# Patient Record
Sex: MALE | Race: WHITE | Employment: FULL TIME | URBAN - METROPOLITAN AREA
[De-identification: names, ages, dates, MRNs, and addresses within clinical notes are randomized per-mention and may not be internally consistent; named-entity substitution may affect disease eponyms.]

---

## 2017-01-03 ENCOUNTER — HOSPITAL ENCOUNTER (OUTPATIENT)
Dept: PHYSICAL THERAPY | Age: 67
Discharge: HOME | End: 2017-01-03
Payer: COMMERCIAL

## 2017-01-03 PROCEDURE — 97110 THERAPEUTIC EXERCISES: CPT

## 2017-01-03 PROCEDURE — 97162 PT EVAL MOD COMPLEX 30 MIN: CPT

## 2017-01-03 NOTE — PROGRESS NOTES
Ambulatory/Rehab Services H2 Model Falls Risk Assessment    Risk Factor Pts. ·   Confusion/Disorientation/Impulsivity  []    4 ·   Symptomatic Depression  []   2 ·   Altered Elimination  []   1 ·   Dizziness/Vertigo  []   1 ·   Gender (Male)  [x]   1 ·   Any administered antiepileptics (anticonvulsants):  []   2 ·   Any administered benzodiazepines:  []   1 ·   Visual Impairment (specify):  []   1 ·   Portable Oxygen Use  []   1 ·   Orthostatic ? BP  []   1 ·   History of Recent Falls (within 3 mos.)  []   5     Ability to Rise from Chair (choose one) Pts. ·   Ability to rise in a single movement  [x]   0 ·   Pushes up, successful in one attempt  []   1 ·   Multiple attempts, but successful  []   3 ·   Unable to rise without assistance  []   4   Total: (5 or greater = High Risk) 1     Falls Prevention Plan:   []                Physical Limitations to Exercise (specify):   []                Mobility Assistance Device (type):   []                Exercise/Equipment Adaptation (specify):    ©2010 [facility] [address] Patent #[id number].  Federal Law prohibits the replication, distribution or use without written permission from [facility] [employer]

## 2017-01-03 NOTE — PROGRESS NOTES
Toro Augustine  : 1950 [id number] [facility],[address]  [address] [place] [address].  [phone]   [phone]        OUTPATIENT PHYSICAL THERAPY:Initial Assessment 1/3/2017      ICD-10: Treatment Diagnosis: neck pain  Precautions/Allergies:   Review of patient's allergies indicates not on file. Fall Risk Score: 1 (? 5 = High Risk)  MD Orders: evaluate and treat MEDICAL/REFERRING DIAGNOSIS:  Neck pain [M54.2]  Acute back pain [M54.9]   DATE OF ONSET: 4 week history  REFERRING PHYSICIAN: Fede Bennett 2022: to be determined     INITIAL ASSESSMENT:  Mr. Don Michael presents to therapy with left sided neck pain that is classified as muscular spasm of the upper trap musculature. He has increased tone and tenderness of the upper trap and levator musculature due to weakness and muscle imbalance of the periscapular musculature. He has no signs of neural involvement or shoulder joint involvement as he has good neural mobility and shoulder mobility. He does have restricted cervical rotation to the left due to protective guarding of the musculature. Skilled therapy is required to return to prior level of function. PROBLEM LIST (Impacting functional limitations):  1. Decreased Strength  2. Decreased ADL/Functional Activities  3. Increased Pain  4. Decreased Activity Tolerance  5. Decreased Flexibility/Joint Mobility INTERVENTIONS PLANNED:  1. Cold  2. Electrical Stimulation  3. Heat  4. Home Exercise Program (HEP)  5. Manual Therapy  6. Neuromuscular Re-education/Strengthening  7. Range of Motion (ROM)  8. Therapeutic Activites  9. Therapeutic Exercise/Strengthening   TREATMENT PLAN:  Effective Dates: 17 TO 17. Frequency/Duration: 2 times a week for 8 weeks  GOALS: (Goals have been discussed and agreed upon with patient.)  Short-Term Functional Goals: Time Frame: 2 weeks  1. Patient will be independent with HEP.    2. Patient will report pain <2/10 with daily activity. 3. Patient will improve cervical rotation to 40 ° to improve capacity for looking over his shoulder while driving. Discharge Goals: Time Frame: 8 weeks  1. Patient will have no pain with activities such as reading >1 hour. 2. Patient will improve lower trap strength to 5/5 to normalize muscle stability throughout the scapula and decrease muscular tone. 3. Patient will improve cervical rotation to 60 ° with no pain at end range. Rehabilitation Potential For Stated Goals: Excellent  Regarding Neeru Kam therapy, I certify that the treatment plan above will be carried out by a therapist or under their direction. Thank you for this referral,  Rama Seaman DPT       Referring Physician Signature: Tika Mattson*              Date                      HISTORY:   History of Present Injury/Illness (Reason for Referral):  Patient presents to therapy with primary complaint of left sided neck pain. Symptoms have been present over the past few weeks and he attributes their onset to an increase in activities with a forward neck posture such as working on his iPad and reading. He does note increased pain with activities such as reading a heavy book. He describes his symptoms as a soreness and tightness through the neck and upper back. This has caused diffculty with sleeping. He has been able to manage his symptoms with self massage, stretching and rolling on a ball, however symptoms are not continuing to resolve. He also notes difficulty with activities such as looking over his left shoulder. Past Medical History/Comorbidities:   Mr. Kassy Diaz  has a past medical history of Acute sinusitis; Bilateral impacted cerumen; Cerumen impaction; Chronic sinusitis; Conductive hearing loss; Esophageal reflux; Hearing loss; Hearing loss due to cerumen impaction; and Itching of ear.   Mr. Kassy Diaz  has a past surgical history that includes back surgery and other surgical.  Social History/Living Environment:     Patient lives at home with his wife. Prior Level of Function/Work/Activity:  Patient is a . Dominant Side:         RIGHT  Current Medications:    Current Outpatient Prescriptions:     aspirin delayed-release 81 mg tablet, Take 81 mg by mouth., Disp: , Rfl:     cetirizine (ZYRTEC) 10 mg tablet, 10 mg., Disp: , Rfl:     tadalafil (CIALIS) 5 mg tablet, 5 mg., Disp: , Rfl:     ciclopirox (LOPROX) 0.77 % susp, APPLY TO THE AFFECTED TOENAILS TWICE DAILY, Disp: , Rfl: 11   Date Last Reviewed:  1/3/2017   # of Personal Factors/Comorbidities that affect the Plan of Care: 1-2: MODERATE COMPLEXITY   EXAMINATION:   Observation/Orthostatic Postural Assessment:          Mild increase in thoracic kyphosis with forward head posture. Left scapula is mildly elevated. Palpation:          Increased tone is present through left levator and scalenes with tender points noted throughout rhomboids and infraspinatus. ROM:     Cervical extension: 60 °   Cervical flexion: 70 °  Cervical rotation: 30 ° L; 60 ° R  Lateral cervical flexion: 40 ° B with left sided pain on overpressure to L and left sided tightness with overpressure to R  Shoulder mobility is full  Thoracic rotation is mildly restricted      Strength:     Deep cervical flexion: 4/5   Shoulder elevation: 5/5 B  Shoulder ER: 5/5 B  Rhomboid: 4/5 L; 5/5 R  Prone scaption: 4/5 L; 5/5 R      Special Tests:          Spurling's (-)  Neurological Screen:        Myotomes:  WNL throughout upper extremity        Reflexes:  Symmetric to Ue        Neural Tension Tests:  full  Functional Mobility:         independent  Balance: WNL   Body Structures Involved:  1. Bones  2. Joints  3. Muscles Body Functions Affected:  1. Neuromusculoskeletal  2. Movement Related Activities and Participation Affected:  1. General Tasks and Demands  2. Mobility  3. Domestic Life  4.  Community, Social and [place]   # of elements that affect the Plan of Care: 3: MODERATE COMPLEXITY   CLINICAL PRESENTATION:   Presentation: Evolving clinical presentation with changing clinical characteristics: MODERATE COMPLEXITY   CLINICAL DECISION MAKING:   Outcome Measure: Tool Used: Modified Oswestry Disability Index  Score:  Initial: 5/50  Most Recent: X/50 (Date: -- )   Interpretation of Score: Each section is scored on a 0-5 scale, 5 representing the greatest disability. The scores of each section are added together for a total score of 50. Score 0 1-10 11-20 21-30 31-40 41-49 50   Modifier CH CI CJ CK CL CM CN       Medical Necessity:   · Patient is expected to demonstrate progress in strength, range of motion, balance and coordination to increase independence with activities that involve movement of the head and to relieve pain. Reason for Services/Other Comments:  · Patient has demonstrated an improvement in functional level by independent performance of HEP. Use of outcome tool(s) and clinical judgement create a POC that gives a: Questionable prediction of patient's progress: MODERATE COMPLEXITY   TREATMENT:   (In addition to Assessment/Re-Assessment sessions the following treatments were rendered)  THERAPEUTIC EXERCISE: (see flow sheet below for specific duration minutes):  Exercises per grid below to improve mobility, strength, balance and coordination. Required minimal visual, verbal and manual cues to promote proper body alignment, promote proper body posture and promote proper body mechanics. Progressed resistance, range, repetitions and complexity of movement as indicated. MANUAL THERAPY: (see flow sheet below for specific duration minutes): Joint mobilization, Soft tissue mobilization and Manipulation was utilized and necessary because of the patient's restricted joint motion, painful spasm, loss of articular motion and restricted motion of soft tissue.    MODALITIES: (see flow sheet below for specific duration minutes):      to relieve pain     Date: 01/03/17       Modalities:                                Therapeutic Exercise: 10 min       Cervical retraction [id number]       Upper trap stretch To R only [id number]       Sidelying ER 3x10 0-3#       Y, T 2x10 with 5sec hold 0-3#                       Proprioceptive Activities:                                Manual Therapy: 5 min       Soft tissue mobilization To upper trap and periscapular musculature performed               Functional Activities:                                        HEP: see flow sheet above for specific duration  Treatment/Session Assessment:  Patient is independent with performance of above described home program. Decreased tone is noted following soft tissue mobilization. · Pain/ Symptoms: Initial:   4/10 Post Session:  2/10 ·   Compliance with Program/Exercises: Will assess as treatment progresses. · Recommendations/Intent for next treatment session: \"Next visit will focus on advancements to more challenging activities\".   Total Treatment Duration:  PT Patient Time In/Time Out  Time In: 0845  Time Out: 9[address] [facility], [facility]

## 2017-01-03 NOTE — PROGRESS NOTES
Ayan Krishnan   (:1950) [address]  [address] [place] [address].  [phone]   [phone]           Outpatient PHYSICAL THERAPY: Initial Assessment 1/3/2017  Fall Risk Score: 1 (? 5 = High Risk)    ICD-10: Treatment Diagnosis: Cervicalgia (M54.2)  REFERRING PHYSICIAN: Mickey Cancino*  MD Orders: Eval and Treat   Return Physician Appointment: to be determined  MEDICAL/REFERRING DIAGNOSIS: Neck pain [M54.2]  Acute back pain [M54.9]  DATE OF ONSET: 4 week history   PRIOR LEVEL OF FUNCTION: fully independent  PRECAUTIONS/ALLERGIES: Not on File    ASSESSMENT:  ?????? ? ? This section established at most recent assessment?????????? Patient presents to therapy with left sided neck pain that is classified as muscular spasm of the upper trap musculature. He has increased tone and tenderness of the upper trap and levator musculature due to weakness and muscle imbalance of the periscapular musculature. He has no signs of neural involvement or shoulder joint involvement as he has good neural mobility and shoulder mobility. He does have restricted cervical rotation to the left due to protective guarding of the musculature. Skilled therapy is required to return to prior level of function. PROBLEM LIST (Impairments causing functional limitations):  1. { :57170:p}  GOALS: (Goals have been discussed and agreed upon with patient.)  SHORT-TERM FUNCTIONAL GOALS: Time Frame: ***  1. ***  DISCHARGE GOALS: Time Frame: ***  1. ***  REHABILITATION POTENTIAL FOR STATED GOALS: { :73551::\"***\"}PLAN OF CARE:  INTERVENTIONS PLANNED: (Benefits and precautions of physical therapy have been discussed with the patient.)  1. {PT PLAN OF CARE:65388:p:\"***\"}  TREATMENT PLAN EFFECTIVE DATES: *** TO ***  FREQUENCY/DURATION: Follow patient { :79002} for { :88048::\"12 weeks\"} to address above goals.   Regarding Asiya Morning therapy, I certify that the treatment plan above will be carried out by a therapist or under their direction. Thank you for this referral,  Vincent Bates DPT     Referring Physician Signature: Tyson Capone          Date                       SUBJECTIVE:    History of Present Injury/Illness (Reason for Referral): ***  Present Symptoms: ***   Pain Intensity 1: 4  Pain Intervention(s) 1: Exercise  Dominant Side: {Side:38610}  Past Medical History:   Past Medical History   Diagnosis Date    Acute sinusitis     Bilateral impacted cerumen     Cerumen impaction     Chronic sinusitis     Conductive hearing loss     Esophageal reflux     Hearing loss     Hearing loss due to cerumen impaction     Itching of ear        Current Medications:   Current Outpatient Prescriptions on File Prior to Encounter   Medication Sig Dispense Refill    aspirin delayed-release 81 mg tablet Take 81 mg by mouth.  cetirizine (ZYRTEC) 10 mg tablet 10 mg.      tadalafil (CIALIS) 5 mg tablet 5 mg.  ciclopirox (LOPROX) 0.77 % susp APPLY TO THE AFFECTED TOENAILS TWICE DAILY  11     No current facility-administered medications on file prior to encounter. Date Last Reviewed: 1/3/2017    Social History/Home Situation: ***     Work/Activity History: ***  OBJECTIVE:    Outcome Measure:   {PT/OT OUTCOME MEASURES:14271210}  Observation/Orthostatic Postural Assessment:   ***  Palpation:   ***  ROM: ***                          Strength: ***                 Special Tests: ***  Gait: ***  Functional Mobility:      ***  Balance:   ***  TREATMENT:    (In addition to Assessment/Re-Assessment sessions the following treatments were rendered)  THERAPEUTIC ACTIVITY: ( See chart below for minutes): Therapeutic activities per grid below to improve {PT MOBILITY:42338579}. Required {NO/MIN/MOD/MAX:06966} {CUES:02733753} cues to {PT PROMOTE:72742701}. THERAPEUTIC EXERCISE: (See chart below for minutes):  Exercises per grid below to improve {PT MOBILITY:61084893}. Required {NO/MIN/MOD/MAX:17821} {CUES:73260149} cues to {PT ALIGNMENT:05357372}. Progressed {desc:05433787} as indicated. Date: ***       Modalities:        ***                        Therapeutic Exercise:        ***                                                Proprioceptive Activities:        ***                        Manual Therapy:        ***                Therapeutic Activities:        ***                                    MANUAL THERAPY: (See chart above for minutes): {MANUAL THERAPY:23521292} was utilized and necessary because of the patient's {MANUAL THERAPY 8:60568406}. MODALITIES: (See chart above for minutes): {PT MODALITIES:10308538}       HEP: As above; handouts given to patient for all exercises. ______________________________________________________________________________________________________    Treatment Assessment:  ***. Progression/Medical Necessity:   · {PROGRESSION:34603070}  Compliance with Program/Exercises: { :154902::\"Will assess as treatment progresses\"}. Reason for Continuation of Services/Other Comments:  · {CONTINUATION:98908803}  Recommendations/Intent for next treatment session: \"Treatment next visit will focus on {MedNecessity:16547850}\".   ***  Total Treatment Duration:  PT Patient Time In/Time Out  Time In: 0845  Time Out: [address], T

## 2017-01-05 ENCOUNTER — HOSPITAL ENCOUNTER (OUTPATIENT)
Dept: PHYSICAL THERAPY | Age: 67
Discharge: HOME | End: 2017-01-05
Payer: COMMERCIAL

## 2017-01-05 PROCEDURE — 97140 MANUAL THERAPY 1/> REGIONS: CPT

## 2017-01-05 NOTE — PROGRESS NOTES
Shahnaz Session  : 1950 [address] [address]  [phone]   [facility] [phone]        OUTPATIENT PHYSICAL THERAPY:Daily Note 2017      ICD-10: Treatment Diagnosis: neck pain  Precautions/Allergies:   Review of patient's allergies indicates not on file. Fall Risk Score: 1 (? 5 = High Risk)  MD Orders: evaluate and treat MEDICAL/REFERRING DIAGNOSIS:  Neck pain [M54.2]  Acute back pain [M54.9]   DATE OF ONSET: 4 week history  REFERRING PHYSICIAN: Fede Bennett 2297: to be determined     INITIAL ASSESSMENT:  Mr. Camilo Jaramillo presents to therapy with left sided neck pain that is classified as muscular spasm of the upper trap musculature. He has increased tone and tenderness of the upper trap and levator musculature due to weakness and muscle imbalance of the periscapular musculature. He has no signs of neural involvement or shoulder joint involvement as he has good neural mobility and shoulder mobility. He does have restricted cervical rotation to the left due to protective guarding of the musculature. Skilled therapy is required to return to prior level of function. PROBLEM LIST (Impacting functional limitations):  1. Decreased Strength  2. Decreased ADL/Functional Activities  3. Increased Pain  4. Decreased Activity Tolerance  5. Decreased Flexibility/Joint Mobility INTERVENTIONS PLANNED:  1. Cold  2. Electrical Stimulation  3. Heat  4. Home Exercise Program (HEP)  5. Manual Therapy  6. Neuromuscular Re-education/Strengthening  7. Range of Motion (ROM)  8. Therapeutic Activites  9. Therapeutic Exercise/Strengthening   TREATMENT PLAN:  Effective Dates: 17 TO 17. Frequency/Duration: 2 times a week for 8 weeks  GOALS: (Goals have been discussed and agreed upon with patient.)  Short-Term Functional Goals: Time Frame: 2 weeks  1. Patient will be independent with HEP.    2. Patient will report pain <2/10 with daily activity. 3. Patient will improve cervical rotation to 40 ° to improve capacity for looking over his shoulder while driving. Discharge Goals: Time Frame: 8 weeks  1. Patient will have no pain with activities such as reading >1 hour. 2. Patient will improve lower trap strength to 5/5 to normalize muscle stability throughout the scapula and decrease muscular tone. 3. Patient will improve cervical rotation to 60 ° with no pain at end range. Rehabilitation Potential For Stated Goals: Excellent  Regarding [facility] therapy, I certify that the treatment plan above will be carried out by a therapist or under their direction. Thank you for this referral,  Grace John DPT       Referring Physician Signature: Talia Maldonado*              Date                      HISTORY:   History of Present Injury/Illness (Reason for Referral):  Patient presents to therapy with primary complaint of left sided neck pain. Symptoms have been present over the past few weeks and he attributes their onset to an increase in activities with a forward neck posture such as working on his iPad and reading. He does note increased pain with activities such as reading a heavy book. He describes his symptoms as a soreness and tightness through the neck and upper back. This has caused diffculty with sleeping. He has been able to manage his symptoms with self massage, stretching and rolling on a ball, however symptoms are not continuing to resolve. He also notes difficulty with activities such as looking over his left shoulder. 01/05/17 patient report: It's actually feeling much better. I get some popping when I'm stretching my neck, but it's nice to know that I'm not harming anything. The shoulder exercises are actually pretty hard. My neck does seem like it's able to rotate a lot better.   Past Medical History/Comorbidities:   Mr. Emily Flowers  has a past medical history of Acute sinusitis; Bilateral impacted cerumen; Cerumen impaction; Chronic sinusitis; Conductive hearing loss; Esophageal reflux; Hearing loss; Hearing loss due to cerumen impaction; and Itching of ear. Mr. Julienne Gillespie  has a past surgical history that includes back surgery and other surgical.  Social History/Living Environment:     Patient lives at home with his wife. Prior Level of Function/Work/Activity:  Patient is a . Dominant Side:         RIGHT  Current Medications:    Current Outpatient Prescriptions:     aspirin delayed-release 81 mg tablet, Take 81 mg by mouth., Disp: , Rfl:     cetirizine (ZYRTEC) 10 mg tablet, 10 mg., Disp: , Rfl:     tadalafil (CIALIS) 5 mg tablet, 5 mg., Disp: , Rfl:     ciclopirox (LOPROX) 0.77 % susp, APPLY TO THE AFFECTED TOENAILS TWICE DAILY, Disp: , Rfl: 11   Date Last Reviewed:  1/5/2017   EXAMINATION:   Observation/Orthostatic Postural Assessment:          Mild increase in thoracic kyphosis with forward head posture. Left scapula is mildly elevated. Palpation:          Increased tone is present through left levator and scalenes with tender points noted throughout rhomboids and infraspinatus. ROM:     Cervical extension: 60 °   Cervical flexion: 70 °  Cervical rotation: 30 ° L; 60 ° R  Lateral cervical flexion: 40 ° B with left sided pain on overpressure to L and left sided tightness with overpressure to R  Shoulder mobility is full  Thoracic rotation is mildly restricted      Strength:     Deep cervical flexion: 4/5   Shoulder elevation: 5/5 B  Shoulder ER: 5/5 B  Rhomboid: 4/5 L; 5/5 R  Prone scaption: 4/5 L; 5/5 R      Special Tests:          Spurling's (-)  Neurological Screen:        Myotomes:  WNL throughout upper extremity        Reflexes:  Symmetric to Ue        Neural Tension Tests:  full  Functional Mobility:         independent  Balance: WNL   Body Structures Involved:  1. Bones  2. Joints  3. Muscles Body Functions Affected:  1. Neuromusculoskeletal  2.  Movement Related Activities and Participation Affected:  1. General Tasks and Demands  2. Mobility  3. Domestic Life  4. Community, Social and Civic Life   CLINICAL PRESENTATION:   CLINICAL DECISION MAKING:   Outcome Measure: Tool Used: Modified Oswestry Disability Index  Score:  Initial: 5/50  Most Recent: X/50 (Date: -- )   Interpretation of Score: Each section is scored on a 0-5 scale, 5 representing the greatest disability. The scores of each section are added together for a total score of 50. Score 0 1-10 11-20 21-30 31-40 41-49 50   Modifier CH CI CJ CK CL CM CN       Medical Necessity:   · Patient is expected to demonstrate progress in strength, range of motion, balance and coordination to increase independence with activities that involve movement of the head and to relieve pain. Reason for Services/Other Comments:  · Patient has demonstrated an improvement in functional level by independent performance of HEP. TREATMENT:   (In addition to Assessment/Re-Assessment sessions the following treatments were rendered)  THERAPEUTIC EXERCISE: (see flow sheet below for specific duration minutes):  Exercises per grid below to improve mobility, strength, balance and coordination. Required minimal visual, verbal and manual cues to promote proper body alignment, promote proper body posture and promote proper body mechanics. Progressed resistance, range, repetitions and complexity of movement as indicated. MANUAL THERAPY: (see flow sheet below for specific duration minutes): Joint mobilization, Soft tissue mobilization and Manipulation was utilized and necessary because of the patient's restricted joint motion, painful spasm, loss of articular motion and restricted motion of soft tissue.    MODALITIES: (see flow sheet below for specific duration minutes):      to relieve pain     Date: 01/03/17 01/05/17 (visit 2)      Modalities:                                Therapeutic Exercise: 10 min 5 min      Cervical retraction [id number]       Upper trap stretch To R only [id number]       Sidelying ER 3x10 0-3#       Y, T 2x10 with 5sec hold 0-3#       Foam rolling  Instructed in self mobilization with lacrosse ball to infraspinatus              Proprioceptive Activities:                                Manual Therapy: 5 min 40 min      Soft tissue mobilization To upper trap and periscapular musculature performed To cervical spine, thoracic paraspinals and posterior shoulder musculature      Passive accessory mobilizations  Lateral cervical glides L to R grade 3 to 3++ 3x30; scapular distraction grade 3 3x30      Functional Activities:                                        HEP: see flow sheet above for specific duration  Treatment/Session Assessment: Tone is improved following manual therapy. Cervical rotation to the L is improved to 60 °. He continues to have pain at end range of rotation. .    · Pain/ Symptoms: Initial:   3/10 Post Session:  0/10 ·   Compliance with Program/Exercises: Will assess as treatment progresses. · Recommendations/Intent for next treatment session: \"Next visit will focus on advancements to more challenging activities\".   Total Treatment Duration:  PT Patient Time In/Time Out  Time In: 0845  Time Out: [facility], [facility]

## 2017-01-09 ENCOUNTER — HOSPITAL ENCOUNTER (OUTPATIENT)
Dept: PHYSICAL THERAPY | Age: 67
Discharge: HOME | End: 2017-01-09
Payer: COMMERCIAL

## 2017-01-09 PROCEDURE — 97140 MANUAL THERAPY 1/> REGIONS: CPT

## 2017-01-09 NOTE — PROGRESS NOTES
Shyanne Sosa  : 1950 [address] [facility]  [address] [place] [address].  [phone]   [phone]        OUTPATIENT PHYSICAL THERAPY:Daily Note 2017      ICD-10: Treatment Diagnosis: neck pain  Precautions/Allergies:   Review of patient's allergies indicates not on file. Fall Risk Score: 1 (? 5 = High Risk)  MD Orders: evaluate and treat MEDICAL/REFERRING DIAGNOSIS:  Neck pain [M54.2]  Acute back pain [M54.9]   DATE OF ONSET: 4 week history  REFERRING PHYSICIAN: Fede Bennett [id number]: to be determined     INITIAL ASSESSMENT:  Mr. Emilia Reddy presents to therapy with left sided neck pain that is classified as muscular spasm of the upper trap musculature. He has increased tone and tenderness of the upper trap and levator musculature due to weakness and muscle imbalance of the periscapular musculature. He has no signs of neural involvement or shoulder joint involvement as he has good neural mobility and shoulder mobility. He does have restricted cervical rotation to the left due to protective guarding of the musculature. Skilled therapy is required to return to prior level of function. PROBLEM LIST (Impacting functional limitations):  1. Decreased Strength  2. Decreased ADL/Functional Activities  3. Increased Pain  4. Decreased Activity Tolerance  5. Decreased Flexibility/Joint Mobility INTERVENTIONS PLANNED:  1. Cold  2. Electrical Stimulation  3. Heat  4. Home Exercise Program (HEP)  5. Manual Therapy  6. Neuromuscular Re-education/Strengthening  7. Range of Motion (ROM)  8. Therapeutic Activites  9. Therapeutic Exercise/Strengthening   TREATMENT PLAN:  Effective Dates: 17 TO 17. Frequency/Duration: 2 times a week for 8 weeks  GOALS: (Goals have been discussed and agreed upon with patient.)  Short-Term Functional Goals: Time Frame: 2 weeks  1. Patient will be independent with HEP.    2. Patient will report pain <2/10 with daily activity. 3. Patient will improve cervical rotation to 40 ° to improve capacity for looking over his shoulder while driving. Discharge Goals: Time Frame: 8 weeks  1. Patient will have no pain with activities such as reading >1 hour. 2. Patient will improve lower trap strength to 5/5 to normalize muscle stability throughout the scapula and decrease muscular tone. 3. Patient will improve cervical rotation to 60 ° with no pain at end range. Rehabilitation Potential For Stated Goals: Excellent  Regarding [employer] therapy, I certify that the treatment plan above will be carried out by a therapist or under their direction. Thank you for this referral,  Adrienne Zambrano DPT       Referring Physician Signature: Stefan Yates*              Date                      HISTORY:   History of Present Injury/Illness (Reason for Referral):  Patient presents to therapy with primary complaint of left sided neck pain. Symptoms have been present over the past few weeks and he attributes their onset to an increase in activities with a forward neck posture such as working on his iPad and reading. He does note increased pain with activities such as reading a heavy book. He describes his symptoms as a soreness and tightness through the neck and upper back. This has caused diffculty with sleeping. He has been able to manage his symptoms with self massage, stretching and rolling on a ball, however symptoms are not continuing to resolve. He also notes difficulty with activities such as looking over his left shoulder. 01/05/17 patient report: It's actually feeling much better. I get some popping when I'm stretching my neck, but it's nice to know that I'm not harming anything. The shoulder exercises are actually pretty hard. My neck does seem like it's able to rotate a lot better.   Past Medical History/Comorbidities:   Mr. Don Michael  has a past medical history of Acute sinusitis; Bilateral impacted cerumen; Cerumen impaction; Chronic sinusitis; Conductive hearing loss; Esophageal reflux; Hearing loss; Hearing loss due to cerumen impaction; and Itching of ear. Mr. Rina Franklin  has a past surgical history that includes back surgery and other surgical.  Social History/Living Environment:     Patient lives at home with his wife. Prior Level of Function/Work/Activity:  Patient is a . Dominant Side:         RIGHT  Current Medications:    Current Outpatient Prescriptions:     aspirin delayed-release 81 mg tablet, Take 81 mg by mouth., Disp: , Rfl:     cetirizine (ZYRTEC) 10 mg tablet, 10 mg., Disp: , Rfl:     tadalafil (CIALIS) 5 mg tablet, 5 mg., Disp: , Rfl:     ciclopirox (LOPROX) 0.77 % susp, APPLY TO THE AFFECTED TOENAILS TWICE DAILY, Disp: , Rfl: 11   Date Last Reviewed:  1/9/2017   EXAMINATION:   Observation/Orthostatic Postural Assessment:          Mild increase in thoracic kyphosis with forward head posture. Left scapula is mildly elevated. Palpation:          Increased tone is present through left levator and scalenes with tender points noted throughout rhomboids and infraspinatus. ROM:     Cervical extension: 60 °   Cervical flexion: 70 °  Cervical rotation: 30 ° L; 60 ° R  Lateral cervical flexion: 40 ° B with left sided pain on overpressure to L and left sided tightness with overpressure to R  Shoulder mobility is full  Thoracic rotation is mildly restricted      Strength:     Deep cervical flexion: 4/5   Shoulder elevation: 5/5 B  Shoulder ER: 5/5 B  Rhomboid: 4/5 L; 5/5 R  Prone scaption: 4/5 L; 5/5 R      Special Tests:          Spurling's (-)  Neurological Screen:        Myotomes:  WNL throughout upper extremity        Reflexes:  Symmetric to Ue        Neural Tension Tests:  full  Functional Mobility:         independent  Balance: WNL   Body Structures Involved:  1. Bones  2. Joints  3. Muscles Body Functions Affected:  1. Neuromusculoskeletal  2.  Movement Related Activities and Participation Affected:  1. General Tasks and Demands  2. Mobility  3. Domestic Life  4. Community, Social and Civic Life   CLINICAL PRESENTATION:   CLINICAL DECISION MAKING:   Outcome Measure: Tool Used: Modified Oswestry Disability Index  Score:  Initial: 5/50  Most Recent: X/50 (Date: -- )   Interpretation of Score: Each section is scored on a 0-5 scale, 5 representing the greatest disability. The scores of each section are added together for a total score of 50. Score 0 1-10 11-20 21-30 31-40 41-49 50   Modifier CH CI CJ CK CL CM CN       Medical Necessity:   · Patient is expected to demonstrate progress in strength, range of motion, balance and coordination to increase independence with activities that involve movement of the head and to relieve pain. Reason for Services/Other Comments:  · Patient has demonstrated an improvement in functional level by independent performance of HEP. TREATMENT:   (In addition to Assessment/Re-Assessment sessions the following treatments were rendered)  THERAPEUTIC EXERCISE: (see flow sheet below for specific duration minutes):  Exercises per grid below to improve mobility, strength, balance and coordination. Required minimal visual, verbal and manual cues to promote proper body alignment, promote proper body posture and promote proper body mechanics. Progressed resistance, range, repetitions and complexity of movement as indicated. MANUAL THERAPY: (see flow sheet below for specific duration minutes): Joint mobilization, Soft tissue mobilization and Manipulation was utilized and necessary because of the patient's restricted joint motion, painful spasm, loss of articular motion and restricted motion of soft tissue.    MODALITIES: (see flow sheet below for specific duration minutes):      to relieve pain     Date: 01/03/17 01/05/17 (visit 2) 01/09/17 (visit 3)     Modalities:                                Therapeutic Exercise: 10 min 5 min Cervical retraction [id number]       Upper trap stretch To R only [id number]       Sidelying ER 3x10 0-3#       Y, T 2x10 with 5sec hold 0-3#       Foam rolling  Instructed in self mobilization with lacrosse ball to infraspinatus              Proprioceptive Activities:                                Manual Therapy: 5 min 40 min 40 min     Soft tissue mobilization To upper trap and periscapular musculature performed To cervical spine, thoracic paraspinals and posterior shoulder musculature repeat     Passive accessory mobilizations  Lateral cervical glides L to R grade 3 to 3++ 3x30; scapular distraction grade 3 3x30 repeat     Functional Activities:                                        HEP: see flow sheet above for specific durationr  Treatment/Session Assessment:   He continued pain at end range of rotation. Tone remains through periscapular musculature. · Pain/ Symptoms: Initial:   4/10. \"It's been a bit more sore but I had to fly over the weekend and I was doing a lot of reading. \" Post Session:  0/10 ·   Compliance with Program/Exercises: Will assess as treatment progresses. · Recommendations/Intent for next treatment session: \"Next visit will focus on advancements to more challenging activities\".   Total Treatment Duration:  PT Patient Time In/Time Out  Time In: 7625  Time Out: FIORDALIZA Lara

## 2017-01-11 ENCOUNTER — HOSPITAL ENCOUNTER (OUTPATIENT)
Dept: PHYSICAL THERAPY | Age: 67
Discharge: HOME | End: 2017-01-11
Payer: COMMERCIAL

## 2017-01-11 PROCEDURE — 97140 MANUAL THERAPY 1/> REGIONS: CPT

## 2017-01-11 NOTE — PROGRESS NOTES
Charlette Rosenbaum  : 1950 [address], [address] [place] [address]  [phone]   [phone]        OUTPATIENT PHYSICAL THERAPY:Daily Note 2017      ICD-10: Treatment Diagnosis: neck pain  Precautions/Allergies:   Review of patient's allergies indicates not on file. Fall Risk Score: 1 (? 5 = High Risk)  MD Orders: evaluate and treat MEDICAL/REFERRING DIAGNOSIS:  Neck pain [M54.2]  Acute back pain [M54.9]   DATE OF ONSET: 4 week history  REFERRING PHYSICIAN: Fede Bennett [id number]: to be determined     INITIAL ASSESSMENT:  Mr. Julienne Gillespie presents to therapy with left sided neck pain that is classified as muscular spasm of the upper trap musculature. He has increased tone and tenderness of the upper trap and levator musculature due to weakness and muscle imbalance of the periscapular musculature. He has no signs of neural involvement or shoulder joint involvement as he has good neural mobility and shoulder mobility. He does have restricted cervical rotation to the left due to protective guarding of the musculature. Skilled therapy is required to return to prior level of function. PROBLEM LIST (Impacting functional limitations):  1. Decreased Strength  2. Decreased ADL/Functional Activities  3. Increased Pain  4. Decreased Activity Tolerance  5. Decreased Flexibility/Joint Mobility INTERVENTIONS PLANNED:  1. Cold  2. Electrical Stimulation  3. Heat  4. Home Exercise Program (HEP)  5. Manual Therapy  6. Neuromuscular Re-education/Strengthening  7. Range of Motion (ROM)  8. Therapeutic Activites  9. Therapeutic Exercise/Strengthening   TREATMENT PLAN:  Effective Dates: 17 TO 17. Frequency/Duration: 2 times a week for 8 weeks  GOALS: (Goals have been discussed and agreed upon with patient.)  Short-Term Functional Goals: Time Frame: 2 weeks  1. Patient will be independent with HEP.    2. Patient will report pain <2/10 with daily activity. 3. Patient will improve cervical rotation to 40 ° to improve capacity for looking over his shoulder while driving. Discharge Goals: Time Frame: 8 weeks  1. Patient will have no pain with activities such as reading >1 hour. 2. Patient will improve lower trap strength to 5/5 to normalize muscle stability throughout the scapula and decrease muscular tone. 3. Patient will improve cervical rotation to 60 ° with no pain at end range. Rehabilitation Potential For Stated Goals: Excellent  Regarding Bipin Logan therapy, I certify that the treatment plan above will be carried out by a therapist or under their direction. Thank you for this referral,  Oksana Kearney DPT                   HISTORY:   History of Present Injury/Illness (Reason for Referral):  Patient presents to therapy with primary complaint of left sided neck pain. Symptoms have been present over the past few weeks and he attributes their onset to an increase in activities with a forward neck posture such as working on his iPad and reading. He does note increased pain with activities such as reading a heavy book. He describes his symptoms as a soreness and tightness through the neck and upper back. This has caused diffculty with sleeping. He has been able to manage his symptoms with self massage, stretching and rolling on a ball, however symptoms are not continuing to resolve. He also notes difficulty with activities such as looking over his left shoulder. 01/05/17 patient report: It's actually feeling much better. I get some popping when I'm stretching my neck, but it's nice to know that I'm not harming anything. The shoulder exercises are actually pretty hard. My neck does seem like it's able to rotate a lot better.   Past Medical History/Comorbidities:   Mr. Dillon Kam  has a past medical history of Acute sinusitis; Bilateral impacted cerumen; Cerumen impaction; Chronic sinusitis; Conductive hearing loss; Esophageal reflux; Hearing loss; Hearing loss due to cerumen impaction; and Itching of ear. Mr. Dane Cat  has a past surgical history that includes back surgery and other surgical.  Social History/Living Environment:     Patient lives at home with his wife. Prior Level of Function/Work/Activity:  Patient is a . Dominant Side:         RIGHT  Current Medications:    Current Outpatient Prescriptions:     aspirin delayed-release 81 mg tablet, Take 81 mg by mouth., Disp: , Rfl:     cetirizine (ZYRTEC) 10 mg tablet, 10 mg., Disp: , Rfl:     tadalafil (CIALIS) 5 mg tablet, 5 mg., Disp: , Rfl:     ciclopirox (LOPROX) 0.77 % susp, APPLY TO THE AFFECTED TOENAILS TWICE DAILY, Disp: , Rfl: 11   Date Last Reviewed:  1/11/2017   EXAMINATION:   Observation/Orthostatic Postural Assessment:          Mild increase in thoracic kyphosis with forward head posture. Left scapula is mildly elevated. Palpation:          Increased tone is present through left levator and scalenes with tender points noted throughout rhomboids and infraspinatus. ROM:     Cervical extension: 60 °   Cervical flexion: 70 °  Cervical rotation: 30 ° L; 60 ° R  Lateral cervical flexion: 40 ° B with left sided pain on overpressure to L and left sided tightness with overpressure to R  Shoulder mobility is full  Thoracic rotation is mildly restricted      Strength:     Deep cervical flexion: 4/5   Shoulder elevation: 5/5 B  Shoulder ER: 5/5 B  Rhomboid: 4/5 L; 5/5 R  Prone scaption: 4/5 L; 5/5 R      Special Tests:          Spurling's (-)  Neurological Screen:        Myotomes:  WNL throughout upper extremity        Reflexes:  Symmetric to Ue        Neural Tension Tests:  full  Functional Mobility:         independent  Balance: WNL   Body Structures Involved:  1. Bones  2. Joints  3. Muscles Body Functions Affected:  1. Neuromusculoskeletal  2. Movement Related Activities and Participation Affected:  1.  General Tasks and Demands  2. Mobility  3. Domestic Life  4. Community, Social and Civic Life   CLINICAL PRESENTATION:   CLINICAL DECISION MAKING:   Outcome Measure: Tool Used: Modified Oswestry Disability Index  Score:  Initial: 5/50  Most Recent: X/50 (Date: -- )   Interpretation of Score: Each section is scored on a 0-5 scale, 5 representing the greatest disability. The scores of each section are added together for a total score of 50. Score 0 1-10 11-20 21-30 31-40 41-49 50   Modifier CH CI CJ CK CL CM CN       Medical Necessity:   · Patient is expected to demonstrate progress in strength, range of motion, balance and coordination to increase independence with activities that involve movement of the head and to relieve pain. Reason for Services/Other Comments:  · Patient has demonstrated an improvement in functional level by independent performance of HEP. TREATMENT:   (In addition to Assessment/Re-Assessment sessions the following treatments were rendered)  THERAPEUTIC EXERCISE: (see flow sheet below for specific duration minutes):  Exercises per grid below to improve mobility, strength, balance and coordination. Required minimal visual, verbal and manual cues to promote proper body alignment, promote proper body posture and promote proper body mechanics. Progressed resistance, range, repetitions and complexity of movement as indicated. MANUAL THERAPY: (see flow sheet below for specific duration minutes): Joint mobilization, Soft tissue mobilization and Manipulation was utilized and necessary because of the patient's restricted joint motion, painful spasm, loss of articular motion and restricted motion of soft tissue.    MODALITIES: (see flow sheet below for specific duration minutes):      to relieve pain     Date: 01/03/17 01/05/17 (visit 2) 01/09/17 (visit 3) 01/11/17 (visit 4)    Modalities:                                Therapeutic Exercise: 10 min 5 min      Cervical retraction [id number]       Upper trap stretch To R only [id number]       Sidelying ER 3x10 0-3#       Y, T 2x10 with 5sec hold 0-3#       Foam rolling  Instructed in self mobilization with lacrosse ball to infraspinatus              Proprioceptive Activities:                                Manual Therapy: 5 min 40 min 40 min 45 min    Soft tissue mobilization To upper trap and periscapular musculature performed To cervical spine, thoracic paraspinals and posterior shoulder musculature repeat repeat    Passive accessory mobilizations  Lateral cervical glides L to R grade 3 to 3++ 3x30; scapular distraction grade 3 3x30 repeat Repeat; will add MWM and SNAG next visit    Functional Activities:                                        HEP: see flow sheet above for specific durationr  Treatment/Session Assessment:   He has full cervical rotation, however this is irritable through the final 20 ° of motion. .     · Pain/ Symptoms: Initial:   4/10. \"It's feeling much better. It's not nearly as sore as it was. \" Post Session:  0/10 ·   Compliance with Program/Exercises: Will assess as treatment progresses. · Recommendations/Intent for next treatment session: \"Next visit will focus on advancements to more challenging activities\".   Total Treatment Duration:  PT Patient Time In/Time Out  Time In: 1530  Time Out: [address], DPT

## 2017-01-16 ENCOUNTER — HOSPITAL ENCOUNTER (OUTPATIENT)
Dept: PHYSICAL THERAPY | Age: 67
Discharge: HOME | End: 2017-01-16
Payer: COMMERCIAL

## 2017-01-16 PROCEDURE — 97110 THERAPEUTIC EXERCISES: CPT

## 2017-01-16 NOTE — PROGRESS NOTES
Riley Marino  : 1950 [address] [facility] [address]  [address] [place] [address].  [phone]   [phone]        OUTPATIENT PHYSICAL THERAPY:Daily Note 2017      ICD-10: Treatment Diagnosis: neck pain  Precautions/Allergies:   Review of patient's allergies indicates not on file. Fall Risk Score: 1 (? 5 = High Risk)  MD Orders: evaluate and treat MEDICAL/REFERRING DIAGNOSIS:  Neck pain [M54.2]  Acute back pain [M54.9]   DATE OF ONSET: 4 week history  REFERRING PHYSICIAN: Fede Bennett [id number]: to be determined     INITIAL ASSESSMENT:  Mr. Tod Saucedo presents to therapy with left sided neck pain that is classified as muscular spasm of the upper trap musculature. He has increased tone and tenderness of the upper trap and levator musculature due to weakness and muscle imbalance of the periscapular musculature. He has no signs of neural involvement or shoulder joint involvement as he has good neural mobility and shoulder mobility. He does have restricted cervical rotation to the left due to protective guarding of the musculature. Skilled therapy is required to return to prior level of function. PROBLEM LIST (Impacting functional limitations):  1. Decreased Strength  2. Decreased ADL/Functional Activities  3. Increased Pain  4. Decreased Activity Tolerance  5. Decreased Flexibility/Joint Mobility INTERVENTIONS PLANNED:  1. Cold  2. Electrical Stimulation  3. Heat  4. Home Exercise Program (HEP)  5. Manual Therapy  6. Neuromuscular Re-education/Strengthening  7. Range of Motion (ROM)  8. Therapeutic Activites  9. Therapeutic Exercise/Strengthening   TREATMENT PLAN:  Effective Dates: 17 TO 17. Frequency/Duration: 2 times a week for 8 weeks  GOALS: (Goals have been discussed and agreed upon with patient.)  Short-Term Functional Goals: Time Frame: 2 weeks  1. Patient will be independent with HEP.    2. Patient will report pain <2/10 with daily activity. 3. Patient will improve cervical rotation to 40 ° to improve capacity for looking over his shoulder while driving. Discharge Goals: Time Frame: 8 weeks  1. Patient will have no pain with activities such as reading >1 hour. 2. Patient will improve lower trap strength to 5/5 to normalize muscle stability throughout the scapula and decrease muscular tone. 3. Patient will improve cervical rotation to 60 ° with no pain at end range. Rehabilitation Potential For Stated Goals: Excellent  Regarding Franc Barr therapy, I certify that the treatment plan above will be carried out by a therapist or under their direction. Thank you for this referral,  Ramón Durham DPT                   HISTORY:   History of Present Injury/Illness (Reason for Referral):  Patient presents to therapy with primary complaint of left sided neck pain. Symptoms have been present over the past few weeks and he attributes their onset to an increase in activities with a forward neck posture such as working on his iPad and reading. He does note increased pain with activities such as reading a heavy book. He describes his symptoms as a soreness and tightness through the neck and upper back. This has caused diffculty with sleeping. He has been able to manage his symptoms with self massage, stretching and rolling on a ball, however symptoms are not continuing to resolve. He also notes difficulty with activities such as looking over his left shoulder. 01/05/17 patient report: It's actually feeling much better. I get some popping when I'm stretching my neck, but it's nice to know that I'm not harming anything. The shoulder exercises are actually pretty hard. My neck does seem like it's able to rotate a lot better.   Past Medical History/Comorbidities:   Mr. Karan Walls  has a past medical history of Acute sinusitis; Bilateral impacted cerumen; Cerumen impaction; Chronic sinusitis; Conductive hearing loss; Esophageal reflux; Hearing loss; Hearing loss due to cerumen impaction; and Itching of ear. Mr. Nellie Hatfield  has a past surgical history that includes back surgery and other surgical.  Social History/Living Environment:     Patient lives at home with his wife. Prior Level of Function/Work/Activity:  Patient is a . Dominant Side:         RIGHT  Current Medications:    Current Outpatient Prescriptions:     aspirin delayed-release 81 mg tablet, Take 81 mg by mouth., Disp: , Rfl:     cetirizine (ZYRTEC) 10 mg tablet, 10 mg., Disp: , Rfl:     tadalafil (CIALIS) 5 mg tablet, 5 mg., Disp: , Rfl:     ciclopirox (LOPROX) 0.77 % susp, APPLY TO THE AFFECTED TOENAILS TWICE DAILY, Disp: , Rfl: 11   Date Last Reviewed:  1/16/2017   EXAMINATION:   Observation/Orthostatic Postural Assessment:          Mild increase in thoracic kyphosis with forward head posture. Left scapula is mildly elevated. Palpation:          Increased tone is present through left levator and scalenes with tender points noted throughout rhomboids and infraspinatus. ROM:     Cervical extension: 60 °   Cervical flexion: 70 °  Cervical rotation: 30 ° L; 60 ° R  Lateral cervical flexion: 40 ° B with left sided pain on overpressure to L and left sided tightness with overpressure to R  Shoulder mobility is full  Thoracic rotation is mildly restricted      Strength:     Deep cervical flexion: 4/5   Shoulder elevation: 5/5 B  Shoulder ER: 5/5 B  Rhomboid: 4/5 L; 5/5 R  Prone scaption: 4/5 L; 5/5 R      Special Tests:          Spurling's (-)  Neurological Screen:        Myotomes:  WNL throughout upper extremity        Reflexes:  Symmetric to Ue        Neural Tension Tests:  full  Functional Mobility:         independent  Balance: WNL   Body Structures Involved:  1. Bones  2. Joints  3. Muscles Body Functions Affected:  1. Neuromusculoskeletal  2. Movement Related Activities and Participation Affected:  1.  General Tasks and Demands  2. Mobility  3. Domestic Life  4. Community, Social and Civic Life   CLINICAL PRESENTATION:   CLINICAL DECISION MAKING:   Outcome Measure: Tool Used: Modified Oswestry Disability Index  Score:  Initial: 5/50  Most Recent: X/50 (Date: -- )   Interpretation of Score: Each section is scored on a 0-5 scale, 5 representing the greatest disability. The scores of each section are added together for a total score of 50. Score 0 1-10 11-20 21-30 31-40 41-49 50   Modifier CH CI CJ CK CL CM CN       Medical Necessity:   · Patient is expected to demonstrate progress in strength, range of motion, balance and coordination to increase independence with activities that involve movement of the head and to relieve pain. Reason for Services/Other Comments:  · Patient has demonstrated an improvement in functional level by independent performance of HEP. TREATMENT:   (In addition to Assessment/Re-Assessment sessions the following treatments were rendered)  THERAPEUTIC EXERCISE: (see flow sheet below for specific duration minutes):  Exercises per grid below to improve mobility, strength, balance and coordination. Required minimal visual, verbal and manual cues to promote proper body alignment, promote proper body posture and promote proper body mechanics. Progressed resistance, range, repetitions and complexity of movement as indicated. MANUAL THERAPY: (see flow sheet below for specific duration minutes): Joint mobilization, Soft tissue mobilization and Manipulation was utilized and necessary because of the patient's restricted joint motion, painful spasm, loss of articular motion and restricted motion of soft tissue.    MODALITIES: (see flow sheet below for specific duration minutes):      to relieve pain     Date: 01/03/17 01/05/17 (visit 2) 01/09/17 (visit 3) 01/11/17 (visit 4) 01/16/17 (visit 5)      Modalities:                                            Therapeutic Exercise: 10 min 5 min         Cervical retraction [id number]          Upper trap stretch To R only [id number]          Sidelying ER 3x10 0-3#          Y, T 2x10 with 5sec hold 0-3#          Foam rolling  Instructed in self mobilization with lacrosse ball to infraspinatus                    Proprioceptive Activities:                                            Manual Therapy: 5 min 40 min 40 min 45 min 45 min      Soft tissue mobilization To upper trap and periscapular musculature performed To cervical spine, thoracic paraspinals and posterior shoulder musculature repeat repeat repeat      Passive accessory mobilizations  Lateral cervical glides L to R grade 3 to 3++ 3x30; scapular distraction grade 3 3x30 repeat Repeat; will add MWM and SNAG next visit repeat      Functional Activities:                                                       HEP: see flow sheet above for specific durationr  Treatment/Session Assessment:   Continued tone and tenderness remains through upper trap, levator and scalenes. This normalizes following manual with full rotation. · Pain/ Symptoms: Initial:   2/10. \"I still just have that knot at the side of my neck. \" Post Session:  0/10 ·   Compliance with Program/Exercises: Will assess as treatment progresses. · Recommendations/Intent for next treatment session: \"Next visit will focus on advancements to more challenging activities\".   Total Treatment Duration:  PT Patient Time In/Time Out  Time In: 0845  Time Out: [address], DPT

## 2017-01-19 ENCOUNTER — HOSPITAL ENCOUNTER (OUTPATIENT)
Dept: PHYSICAL THERAPY | Age: 67
Discharge: HOME | End: 2017-01-19
Payer: COMMERCIAL

## 2017-01-19 PROCEDURE — 97140 MANUAL THERAPY 1/> REGIONS: CPT

## 2017-01-19 NOTE — PROGRESS NOTES
Hamzah Rivera  : 1950 [id number] [facility],[address]  [address] [place] [address].  [phone]   [phone]        OUTPATIENT PHYSICAL THERAPY:Daily Note 2017      ICD-10: Treatment Diagnosis: neck pain  Precautions/Allergies:   Review of patient's allergies indicates not on file. Fall Risk Score: 1 (? 5 = High Risk)  MD Orders: evaluate and treat MEDICAL/REFERRING DIAGNOSIS:  Neck pain [M54.2]  Acute back pain [M54.9]   DATE OF ONSET: 4 week history  REFERRING PHYSICIAN: Fede Bennett [id number]: to be determined     INITIAL ASSESSMENT:  Mr. Dillon Kam presents to therapy with left sided neck pain that is classified as muscular spasm of the upper trap musculature. He has increased tone and tenderness of the upper trap and levator musculature due to weakness and muscle imbalance of the periscapular musculature. He has no signs of neural involvement or shoulder joint involvement as he has good neural mobility and shoulder mobility. He does have restricted cervical rotation to the left due to protective guarding of the musculature. Skilled therapy is required to return to prior level of function. PROBLEM LIST (Impacting functional limitations):  1. Decreased Strength  2. Decreased ADL/Functional Activities  3. Increased Pain  4. Decreased Activity Tolerance  5. Decreased Flexibility/Joint Mobility INTERVENTIONS PLANNED:  1. Cold  2. Electrical Stimulation  3. Heat  4. Home Exercise Program (HEP)  5. Manual Therapy  6. Neuromuscular Re-education/Strengthening  7. Range of Motion (ROM)  8. Therapeutic Activites  9. Therapeutic Exercise/Strengthening   TREATMENT PLAN:  Effective Dates: 17 TO 17. Frequency/Duration: 2 times a week for 8 weeks  GOALS: (Goals have been discussed and agreed upon with patient.)  Short-Term Functional Goals: Time Frame: 2 weeks  1. Patient will be independent with HEP.    2. Patient will report pain <2/10 with daily activity. 3. Patient will improve cervical rotation to 40 ° to improve capacity for looking over his shoulder while driving. Discharge Goals: Time Frame: 8 weeks  1. Patient will have no pain with activities such as reading >1 hour. 2. Patient will improve lower trap strength to 5/5 to normalize muscle stability throughout the scapula and decrease muscular tone. 3. Patient will improve cervical rotation to 60 ° with no pain at end range. Rehabilitation Potential For Stated Goals: Excellent  Regarding Radha List therapy, I certify that the treatment plan above will be carried out by a therapist or under their direction. Thank you for this referral,  Nataly Riley DPT                   HISTORY:   History of Present Injury/Illness (Reason for Referral):  Patient presents to therapy with primary complaint of left sided neck pain. Symptoms have been present over the past few weeks and he attributes their onset to an increase in activities with a forward neck posture such as working on his iPad and reading. He does note increased pain with activities such as reading a heavy book. He describes his symptoms as a soreness and tightness through the neck and upper back. This has caused diffculty with sleeping. He has been able to manage his symptoms with self massage, stretching and rolling on a ball, however symptoms are not continuing to resolve. He also notes difficulty with activities such as looking over his left shoulder. 01/05/17 patient report: It's actually feeling much better. I get some popping when I'm stretching my neck, but it's nice to know that I'm not harming anything. The shoulder exercises are actually pretty hard. My neck does seem like it's able to rotate a lot better.   Past Medical History/Comorbidities:   Mr. Cam Barajas  has a past medical history of Acute sinusitis; Bilateral impacted cerumen; Cerumen impaction; Chronic sinusitis; Conductive hearing loss; Esophageal reflux; Hearing loss; Hearing loss due to cerumen impaction; and Itching of ear. Mr. Hector Mccord  has a past surgical history that includes back surgery and other surgical.  Social History/Living Environment:     Patient lives at home with his wife. Prior Level of Function/Work/Activity:  Patient is a . Dominant Side:         RIGHT  Current Medications:    Current Outpatient Prescriptions:     aspirin delayed-release 81 mg tablet, Take 81 mg by mouth., Disp: , Rfl:     cetirizine (ZYRTEC) 10 mg tablet, 10 mg., Disp: , Rfl:     tadalafil (CIALIS) 5 mg tablet, 5 mg., Disp: , Rfl:     ciclopirox (LOPROX) 0.77 % susp, APPLY TO THE AFFECTED TOENAILS TWICE DAILY, Disp: , Rfl: 11   Date Last Reviewed:  1/19/2017   EXAMINATION:   Observation/Orthostatic Postural Assessment:          Mild increase in thoracic kyphosis with forward head posture. Left scapula is mildly elevated. Palpation:          Increased tone is present through left levator and scalenes with tender points noted throughout rhomboids and infraspinatus. ROM:     Cervical extension: 60 °   Cervical flexion: 70 °  Cervical rotation: 30 ° L; 60 ° R  Lateral cervical flexion: 40 ° B with left sided pain on overpressure to L and left sided tightness with overpressure to R  Shoulder mobility is full  Thoracic rotation is mildly restricted      Strength:     Deep cervical flexion: 4/5   Shoulder elevation: 5/5 B  Shoulder ER: 5/5 B  Rhomboid: 4/5 L; 5/5 R  Prone scaption: 4/5 L; 5/5 R      Special Tests:          Spurling's (-)  Neurological Screen:        Myotomes:  WNL throughout upper extremity        Reflexes:  Symmetric to Ue        Neural Tension Tests:  full  Functional Mobility:         independent  Balance: WNL   Body Structures Involved:  1. Bones  2. Joints  3. Muscles Body Functions Affected:  1. Neuromusculoskeletal  2. Movement Related Activities and Participation Affected:  1.  General Tasks and Demands  2. Mobility  3. Domestic Life  4. Community, Social and Civic Life   CLINICAL PRESENTATION:   CLINICAL DECISION MAKING:   Outcome Measure: Tool Used: Modified Oswestry Disability Index  Score:  Initial: 5/50  Most Recent: X/50 (Date: -- )   Interpretation of Score: Each section is scored on a 0-5 scale, 5 representing the greatest disability. The scores of each section are added together for a total score of 50. Score 0 1-10 11-20 21-30 31-40 41-49 50   Modifier CH CI CJ CK CL CM CN       Medical Necessity:   · Patient is expected to demonstrate progress in strength, range of motion, balance and coordination to increase independence with activities that involve movement of the head and to relieve pain. Reason for Services/Other Comments:  · Patient has demonstrated an improvement in functional level by independent performance of HEP. TREATMENT:   (In addition to Assessment/Re-Assessment sessions the following treatments were rendered)  THERAPEUTIC EXERCISE: (see flow sheet below for specific duration minutes):  Exercises per grid below to improve mobility, strength, balance and coordination. Required minimal visual, verbal and manual cues to promote proper body alignment, promote proper body posture and promote proper body mechanics. Progressed resistance, range, repetitions and complexity of movement as indicated. MANUAL THERAPY: (see flow sheet below for specific duration minutes): Joint mobilization, Soft tissue mobilization and Manipulation was utilized and necessary because of the patient's restricted joint motion, painful spasm, loss of articular motion and restricted motion of soft tissue.    MODALITIES: (see flow sheet below for specific duration minutes):      to relieve pain     Date: 01/03/17 01/05/17 (visit 2) 01/09/17 (visit 3) 01/11/17 (visit 4) 01/16/17 (visit 5)      Modalities:                                            Therapeutic Exercise: 10 min 5 min         Cervical retraction [id number]          Upper trap stretch To R only 1f10ion          Sidelying ER 3x10 0-3#          Y, T 2x10 with 5sec hold 0-3#          Foam rolling  Instructed in self mobilization with lacrosse ball to infraspinatus                    Proprioceptive Activities:                                            Manual Therapy: 5 min 40 min 40 min 45 min 45 min 45 min     Soft tissue mobilization To upper trap and periscapular musculature performed To cervical spine, thoracic paraspinals and posterior shoulder musculature repeat repeat repeat To cervical spine, thoracic paraspinals, shoulder complex     Passive accessory mobilizations  Lateral cervical glides L to R grade 3 to 3++ 3x30; scapular distraction grade 3 3x30 repeat Repeat; will add MWM and SNAG next visit repeat Grade 3 to 4 PA mobilizations to C4,5,6 3x30 per segment. Functional Activities:                                                       HEP: see flow sheet above for specific durationr  Treatment/Session Assessment:   Progressed with deeper STM to thoracic paraspinals. · Pain/ Symptoms: Initial:   2/10. \"The rotation seems like it's getting much better. It's still just stiff at the end though. I've been trying to pay more attention to my posture when I'm reading. \" Post Session:  0/10 ·   Compliance with Program/Exercises: Will assess as treatment progresses. · Recommendations/Intent for next treatment session: \"Next visit will focus on advancements to more challenging activities\".   Total Treatment Duration:  PT Patient Time In/Time Out  Time In: 1530  Time Out: 40[address] Cammal, DPT

## 2017-01-23 ENCOUNTER — HOSPITAL ENCOUNTER (OUTPATIENT)
Dept: PHYSICAL THERAPY | Age: 67
Discharge: HOME | End: 2017-01-23
Payer: COMMERCIAL

## 2017-01-23 PROCEDURE — 97140 MANUAL THERAPY 1/> REGIONS: CPT

## 2017-01-23 NOTE — PROGRESS NOTES
Raeann Channel  : [id number] [facility],[address]  [address] [place] [address].  [phone]   [phone]        OUTPATIENT PHYSICAL THERAPY:Daily Note 2017      ICD-10: Treatment Diagnosis: neck pain  Precautions/Allergies:   Review of patient's allergies indicates not on file. Fall Risk Score: 1 (? 5 = High Risk)  MD Orders: evaluate and treat MEDICAL/REFERRING DIAGNOSIS:  Neck pain [M54.2]  Acute back pain [M54.9]   DATE OF ONSET: 4 week history  REFERRING PHYSICIAN: Fede Bennett [id number]: to be determined     INITIAL ASSESSMENT:  Mr. Hector Mccord presents to therapy with left sided neck pain that is classified as muscular spasm of the upper trap musculature. He has increased tone and tenderness of the upper trap and levator musculature due to weakness and muscle imbalance of the periscapular musculature. He has no signs of neural involvement or shoulder joint involvement as he has good neural mobility and shoulder mobility. He does have restricted cervical rotation to the left due to protective guarding of the musculature. Skilled therapy is required to return to prior level of function. PROBLEM LIST (Impacting functional limitations):  1. Decreased Strength  2. Decreased ADL/Functional Activities  3. Increased Pain  4. Decreased Activity Tolerance  5. Decreased Flexibility/Joint Mobility INTERVENTIONS PLANNED:  1. Cold  2. Electrical Stimulation  3. Heat  4. Home Exercise Program (HEP)  5. Manual Therapy  6. Neuromuscular Re-education/Strengthening  7. Range of Motion (ROM)  8. Therapeutic Activites  9. Therapeutic Exercise/Strengthening   TREATMENT PLAN:  Effective Dates: 17 TO 17. Frequency/Duration: 2 times a week for 8 weeks  GOALS: (Goals have been discussed and agreed upon with patient.)  Short-Term Functional Goals: Time Frame: 2 weeks  1. Patient will be independent with HEP.    2. Patient will report pain <2/10 with daily activity. 3. Patient will improve cervical rotation to 40 ° to improve capacity for looking over his shoulder while driving. Discharge Goals: Time Frame: 8 weeks  1. Patient will have no pain with activities such as reading >1 hour. 2. Patient will improve lower trap strength to 5/5 to normalize muscle stability throughout the scapula and decrease muscular tone. 3. Patient will improve cervical rotation to 60 ° with no pain at end range. Rehabilitation Potential For Stated Goals: Excellent  Regarding Franc Barr therapy, I certify that the treatment plan above will be carried out by a therapist or under their direction. Thank you for this referral,  Ramón Durham DPT                   HISTORY:   History of Present Injury/Illness (Reason for Referral):  Patient presents to therapy with primary complaint of left sided neck pain. Symptoms have been present over the past few weeks and he attributes their onset to an increase in activities with a forward neck posture such as working on his iPad and reading. He does note increased pain with activities such as reading a heavy book. He describes his symptoms as a soreness and tightness through the neck and upper back. This has caused diffculty with sleeping. He has been able to manage his symptoms with self massage, stretching and rolling on a ball, however symptoms are not continuing to resolve. He also notes difficulty with activities such as looking over his left shoulder. 01/05/17 patient report: It's actually feeling much better. I get some popping when I'm stretching my neck, but it's nice to know that I'm not harming anything. The shoulder exercises are actually pretty hard. My neck does seem like it's able to rotate a lot better.   Past Medical History/Comorbidities:   Mr. Karan Walls  has a past medical history of Acute sinusitis; Bilateral impacted cerumen; Cerumen impaction; Chronic sinusitis; Conductive hearing loss; Esophageal reflux; Hearing loss; Hearing loss due to cerumen impaction; and Itching of ear. Mr. Nellie Hatfield  has a past surgical history that includes back surgery and other surgical.  Social History/Living Environment:     Patient lives at home with his wife. Prior Level of Function/Work/Activity:  Patient is a . Dominant Side:         RIGHT  Current Medications:    Current Outpatient Prescriptions:     aspirin delayed-release 81 mg tablet, Take 81 mg by mouth., Disp: , Rfl:     cetirizine (ZYRTEC) 10 mg tablet, 10 mg., Disp: , Rfl:     tadalafil (CIALIS) 5 mg tablet, 5 mg., Disp: , Rfl:     ciclopirox (LOPROX) 0.77 % susp, APPLY TO THE AFFECTED TOENAILS TWICE DAILY, Disp: , Rfl: 11   Date Last Reviewed:  1/23/2017   EXAMINATION:   Observation/Orthostatic Postural Assessment:          Mild increase in thoracic kyphosis with forward head posture. Left scapula is mildly elevated. Palpation:          Increased tone is present through left levator and scalenes with tender points noted throughout rhomboids and infraspinatus. ROM:     Cervical extension: 60 °   Cervical flexion: 70 °  Cervical rotation: 30 ° L; 60 ° R  Lateral cervical flexion: 40 ° B with left sided pain on overpressure to L and left sided tightness with overpressure to R  Shoulder mobility is full  Thoracic rotation is mildly restricted      Strength:     Deep cervical flexion: 4/5   Shoulder elevation: 5/5 B  Shoulder ER: 5/5 B  Rhomboid: 4/5 L; 5/5 R  Prone scaption: 4/5 L; 5/5 R      Special Tests:          Spurling's (-)  Neurological Screen:        Myotomes:  WNL throughout upper extremity        Reflexes:  Symmetric to Ue        Neural Tension Tests:  full  Functional Mobility:         independent  Balance: WNL   Body Structures Involved:  1. Bones  2. Joints  3. Muscles Body Functions Affected:  1. Neuromusculoskeletal  2. Movement Related Activities and Participation Affected:  1.  General Tasks and Demands  2. Mobility  3. Domestic Life  4. Community, Social and Civic Life   CLINICAL PRESENTATION:   CLINICAL DECISION MAKING:   Outcome Measure: Tool Used: Modified Oswestry Disability Index  Score:  Initial: 5/50  Most Recent: X/50 (Date: -- )   Interpretation of Score: Each section is scored on a 0-5 scale, 5 representing the greatest disability. The scores of each section are added together for a total score of 50. Score 0 1-10 11-20 21-30 31-40 41-49 50   Modifier CH CI CJ CK CL CM CN       Medical Necessity:   · Patient is expected to demonstrate progress in strength, range of motion, balance and coordination to increase independence with activities that involve movement of the head and to relieve pain. Reason for Services/Other Comments:  · Patient has demonstrated an improvement in functional level by independent performance of HEP. TREATMENT:   (In addition to Assessment/Re-Assessment sessions the following treatments were rendered)  THERAPEUTIC EXERCISE: (see flow sheet below for specific duration minutes):  Exercises per grid below to improve mobility, strength, balance and coordination. Required minimal visual, verbal and manual cues to promote proper body alignment, promote proper body posture and promote proper body mechanics. Progressed resistance, range, repetitions and complexity of movement as indicated. MANUAL THERAPY: (see flow sheet below for specific duration minutes): Joint mobilization, Soft tissue mobilization and Manipulation was utilized and necessary because of the patient's restricted joint motion, painful spasm, loss of articular motion and restricted motion of soft tissue.    MODALITIES: (see flow sheet below for specific duration minutes):      to relieve pain     Date: 01/03/17 01/05/17 (visit 2) 01/09/17 (visit 3) 01/11/17 (visit 4) 01/16/17 (visit 5) 01/19/17 (visit 6) 01/23/17 (visit 7)    Modalities:                                            Therapeutic Exercise: 10 min 5 min         Cervical retraction [id number]          Upper trap stretch To R only [id number]          Sidelying ER 3x10 0-3#          Y, T 2x10 with 5sec hold 0-3#          Foam rolling  Instructed in self mobilization with lacrosse ball to infraspinatus                    Proprioceptive Activities:                                            Manual Therapy: 5 min 40 min 40 min 45 min 45 min 45 min 45 min    Soft tissue mobilization To upper trap and periscapular musculature performed To cervical spine, thoracic paraspinals and posterior shoulder musculature repeat repeat repeat To cervical spine, thoracic paraspinals, shoulder complex repeat    Passive accessory mobilizations  Lateral cervical glides L to R grade 3 to 3++ 3x30; scapular distraction grade 3 3x30 repeat Repeat; will add MWM and SNAG next visit repeat Grade 3 to 4 PA mobilizations to C4,5,6 3x30 per segment. repeat    Functional Activities:                                                       HEP: see flow sheet above for specific durationr  Treatment/Session Assessment:   Continues to progress with deeper STM to thoracic paraspinals. Pain only remains at overpressure into left cervical rotation end range. · Pain/ Symptoms: Initial:   1/10. \"I\"ve been able to ready for a lot longer without pain. \" Post Session:  0/10 ·   Compliance with Program/Exercises: Will assess as treatment progresses. · Recommendations/Intent for next treatment session: \"Next visit will focus on advancements to more challenging activities\".   Total Treatment Duration:  PT Patient Time In/Time Out  Time In: 0800  Time Out: 7245 [facility], T

## 2017-01-25 ENCOUNTER — HOSPITAL ENCOUNTER (OUTPATIENT)
Dept: PHYSICAL THERAPY | Age: 67
Discharge: HOME | End: 2017-01-25
Payer: COMMERCIAL

## 2017-01-25 PROCEDURE — 97140 MANUAL THERAPY 1/> REGIONS: CPT

## 2017-01-25 NOTE — PROGRESS NOTES
Glenna Su  : 1950 [id number] [facility],[address]  [address] [place] [address].  [phone]   [phone]        OUTPATIENT PHYSICAL THERAPY:Daily Note 2017      ICD-10: Treatment Diagnosis: neck pain  Precautions/Allergies:   Review of patient's allergies indicates not on file. Fall Risk Score: 1 (? 5 = High Risk)  MD Orders: evaluate and treat MEDICAL/REFERRING DIAGNOSIS:  Neck pain [M54.2]  Acute back pain [M54.9]   DATE OF ONSET: 4 week history  REFERRING PHYSICIAN: Fede Bennett [id number]: to be determined     INITIAL ASSESSMENT:  Mr. Yossi Yates presents to therapy with left sided neck pain that is classified as muscular spasm of the upper trap musculature. He has increased tone and tenderness of the upper trap and levator musculature due to weakness and muscle imbalance of the periscapular musculature. He has no signs of neural involvement or shoulder joint involvement as he has good neural mobility and shoulder mobility. He does have restricted cervical rotation to the left due to protective guarding of the musculature. Skilled therapy is required to return to prior level of function. PROBLEM LIST (Impacting functional limitations):  1. Decreased Strength  2. Decreased ADL/Functional Activities  3. Increased Pain  4. Decreased Activity Tolerance  5. Decreased Flexibility/Joint Mobility INTERVENTIONS PLANNED:  1. Cold  2. Electrical Stimulation  3. Heat  4. Home Exercise Program (HEP)  5. Manual Therapy  6. Neuromuscular Re-education/Strengthening  7. Range of Motion (ROM)  8. Therapeutic Activites  9. Therapeutic Exercise/Strengthening   TREATMENT PLAN:  Effective Dates: 17 TO 17. Frequency/Duration: 2 times a week for 8 weeks  GOALS: (Goals have been discussed and agreed upon with patient.)  Short-Term Functional Goals: Time Frame: 2 weeks  1. Patient will be independent with HEP.    2. Patient will report pain <2/10 with daily activity. 3. Patient will improve cervical rotation to 40 ° to improve capacity for looking over his shoulder while driving. Discharge Goals: Time Frame: 8 weeks  1. Patient will have no pain with activities such as reading >1 hour. 2. Patient will improve lower trap strength to 5/5 to normalize muscle stability throughout the scapula and decrease muscular tone. 3. Patient will improve cervical rotation to 60 ° with no pain at end range. Rehabilitation Potential For Stated Goals: Excellent  Regarding Shelton Nicholson therapy, I certify that the treatment plan above will be carried out by a therapist or under their direction. Thank you for this referral,  Marisela Francois DPT                   HISTORY:   History of Present Injury/Illness (Reason for Referral):  Patient presents to therapy with primary complaint of left sided neck pain. Symptoms have been present over the past few weeks and he attributes their onset to an increase in activities with a forward neck posture such as working on his iPad and reading. He does note increased pain with activities such as reading a heavy book. He describes his symptoms as a soreness and tightness through the neck and upper back. This has caused diffculty with sleeping. He has been able to manage his symptoms with self massage, stretching and rolling on a ball, however symptoms are not continuing to resolve. He also notes difficulty with activities such as looking over his left shoulder. 01/05/17 patient report: It's actually feeling much better. I get some popping when I'm stretching my neck, but it's nice to know that I'm not harming anything. The shoulder exercises are actually pretty hard. My neck does seem like it's able to rotate a lot better.   Past Medical History/Comorbidities:   Mr. Emilia Reddy  has a past medical history of Acute sinusitis; Bilateral impacted cerumen; Cerumen impaction; Chronic sinusitis; Conductive hearing loss; Esophageal reflux; Hearing loss; Hearing loss due to cerumen impaction; and Itching of ear. Mr. Supriya Baird  has a past surgical history that includes back surgery and other surgical.  Social History/Living Environment:     Patient lives at home with his wife. Prior Level of Function/Work/Activity:  Patient is a . Dominant Side:         RIGHT  Current Medications:    Current Outpatient Prescriptions:     aspirin delayed-release 81 mg tablet, Take 81 mg by mouth., Disp: , Rfl:     cetirizine (ZYRTEC) 10 mg tablet, 10 mg., Disp: , Rfl:     tadalafil (CIALIS) 5 mg tablet, 5 mg., Disp: , Rfl:     ciclopirox (LOPROX) 0.77 % susp, APPLY TO THE AFFECTED TOENAILS TWICE DAILY, Disp: , Rfl: 11   Date Last Reviewed:  1/25/2017   EXAMINATION:   Observation/Orthostatic Postural Assessment:          Mild increase in thoracic kyphosis with forward head posture. Left scapula is mildly elevated. Palpation:          Increased tone is present through left levator and scalenes with tender points noted throughout rhomboids and infraspinatus. ROM:     Cervical extension: 60 °   Cervical flexion: 70 °  Cervical rotation: 30 ° L; 60 ° R  Lateral cervical flexion: 40 ° B with left sided pain on overpressure to L and left sided tightness with overpressure to R  Shoulder mobility is full  Thoracic rotation is mildly restricted      Strength:     Deep cervical flexion: 4/5   Shoulder elevation: 5/5 B  Shoulder ER: 5/5 B  Rhomboid: 4/5 L; 5/5 R  Prone scaption: 4/5 L; 5/5 R      Special Tests:          Spurling's (-)  Neurological Screen:        Myotomes:  WNL throughout upper extremity        Reflexes:  Symmetric to Ue        Neural Tension Tests:  full  Functional Mobility:         independent  Balance: WNL   Body Structures Involved:  1. Bones  2. Joints  3. Muscles Body Functions Affected:  1. Neuromusculoskeletal  2. Movement Related Activities and Participation Affected:  1.  General Tasks and Demands  2. Mobility  3. Domestic Life  4. Community, Social and Civic Life   CLINICAL PRESENTATION:   CLINICAL DECISION MAKING:   Outcome Measure: Tool Used: Modified Oswestry Disability Index  Score:  Initial: 5/50  Most Recent: X/50 (Date: -- )   Interpretation of Score: Each section is scored on a 0-5 scale, 5 representing the greatest disability. The scores of each section are added together for a total score of 50. Score 0 1-10 11-20 21-30 31-40 41-49 50   Modifier CH CI CJ CK CL CM CN       Medical Necessity:   · Patient is expected to demonstrate progress in strength, range of motion, balance and coordination to increase independence with activities that involve movement of the head and to relieve pain. Reason for Services/Other Comments:  · Patient has demonstrated an improvement in functional level by independent performance of HEP. TREATMENT:   (In addition to Assessment/Re-Assessment sessions the following treatments were rendered)  THERAPEUTIC EXERCISE: (see flow sheet below for specific duration minutes):  Exercises per grid below to improve mobility, strength, balance and coordination. Required minimal visual, verbal and manual cues to promote proper body alignment, promote proper body posture and promote proper body mechanics. Progressed resistance, range, repetitions and complexity of movement as indicated. MANUAL THERAPY: (see flow sheet below for specific duration minutes): Joint mobilization, Soft tissue mobilization and Manipulation was utilized and necessary because of the patient's restricted joint motion, painful spasm, loss of articular motion and restricted motion of soft tissue.    MODALITIES: (see flow sheet below for specific duration minutes):      to relieve pain     Date: 01/03/17 01/05/17 (visit 2) 01/09/17 (visit 3) 01/11/17 (visit 4) 01/16/17 (visit 5) 01/19/17 (visit 6) 01/23/17 (visit 7) 01/25/17 (visit 8)   Modalities: Therapeutic Exercise: 10 min 5 min      5 min   Cervical retraction [id number]          Upper trap stretch To R only [id number]          Sidelying ER 3x10 0-3#          Y, T 2x10 with 5sec hold 0-3#          Foam rolling  Instructed in self mobilization with lacrosse ball to infraspinatus                 tband resistance into cervical extension [id number]   Proprioceptive Activities:                                            Manual Therapy: 5 min 40 min 40 min 45 min 45 min 45 min 45 min 40 min   Soft tissue mobilization To upper trap and periscapular musculature performed To cervical spine, thoracic paraspinals and posterior shoulder musculature repeat repeat repeat To cervical spine, thoracic paraspinals, shoulder complex repeat repeat   Passive accessory mobilizations  Lateral cervical glides L to R grade 3 to 3++ 3x30; scapular distraction grade 3 3x30 repeat Repeat; will add MWM and SNAG next visit repeat Grade 3 to 4 PA mobilizations to C4,5,6 3x30 per segment. repeat repeat   Functional Activities:                                                       HEP: see flow sheet above for specific durationr  Treatment/Session Assessment:   Continues have mild pain on overpressure into right rotation. Added cervical extensor training to improve upright posture and stability. · Pain/ Symptoms: Initial:   0/10. \"I think it's doing a lot better. It doesn't seem to bother me as much when I'm reading. It's still just a nuisance though. \" Post Session:  0/10 ·   Compliance with Program/Exercises: Will assess as treatment progresses. · Recommendations/Intent for next treatment session: \"Next visit will focus on advancements to more challenging activities\".   Total Treatment Duration:  PT Patient Time In/Time Out  Time In: 1400  Time Out: 707 [facility], [facility]

## 2017-01-30 ENCOUNTER — HOSPITAL ENCOUNTER (OUTPATIENT)
Dept: PHYSICAL THERAPY | Age: 67
Discharge: HOME | End: 2017-01-30
Payer: COMMERCIAL

## 2017-01-30 PROCEDURE — 97140 MANUAL THERAPY 1/> REGIONS: CPT

## 2017-01-30 NOTE — PROGRESS NOTES
Shahnaz Session  : 1950 [id number] [facility],[address]  [address] [place] [address].  [phone]   [phone]        OUTPATIENT PHYSICAL THERAPY:Daily Note 2017      ICD-10: Treatment Diagnosis: neck pain  Precautions/Allergies:   Review of patient's allergies indicates not on file. Fall Risk Score: 1 (? 5 = High Risk)  MD Orders: evaluate and treat MEDICAL/REFERRING DIAGNOSIS:  Neck pain [M54.2]  Acute back pain [M54.9]   DATE OF ONSET: 4 week history  REFERRING PHYSICIAN: Fede Bennett [id number]: to be determined     INITIAL ASSESSMENT:  Mr. Camilo Jaramillo presents to therapy with left sided neck pain that is classified as muscular spasm of the upper trap musculature. He has increased tone and tenderness of the upper trap and levator musculature due to weakness and muscle imbalance of the periscapular musculature. He has no signs of neural involvement or shoulder joint involvement as he has good neural mobility and shoulder mobility. He does have restricted cervical rotation to the left due to protective guarding of the musculature. Skilled therapy is required to return to prior level of function. PROBLEM LIST (Impacting functional limitations):  1. Decreased Strength  2. Decreased ADL/Functional Activities  3. Increased Pain  4. Decreased Activity Tolerance  5. Decreased Flexibility/Joint Mobility INTERVENTIONS PLANNED:  1. Cold  2. Electrical Stimulation  3. Heat  4. Home Exercise Program (HEP)  5. Manual Therapy  6. Neuromuscular Re-education/Strengthening  7. Range of Motion (ROM)  8. Therapeutic Activites  9. Therapeutic Exercise/Strengthening   TREATMENT PLAN:  Effective Dates: 17 TO 17. Frequency/Duration: 2 times a week for 8 weeks  GOALS: (Goals have been discussed and agreed upon with patient.)  Short-Term Functional Goals: Time Frame: 2 weeks  1. Patient will be independent with HEP.    2. Patient will report pain <2/10 with daily activity. 3. Patient will improve cervical rotation to 40 ° to improve capacity for looking over his shoulder while driving. Discharge Goals: Time Frame: 8 weeks  1. Patient will have no pain with activities such as reading >1 hour. 2. Patient will improve lower trap strength to 5/5 to normalize muscle stability throughout the scapula and decrease muscular tone. 3. Patient will improve cervical rotation to 60 ° with no pain at end range. Rehabilitation Potential For Stated Goals: Excellent  Regarding Matthieu Lindo therapy, I certify that the treatment plan above will be carried out by a therapist or under their direction. Thank you for this referral,  Breanne Vela, FIORDALIZA                   HISTORY:   History of Present Injury/Illness (Reason for Referral):  Patient presents to therapy with primary complaint of left sided neck pain. Symptoms have been present over the past few weeks and he attributes their onset to an increase in activities with a forward neck posture such as working on his iPad and reading. He does note increased pain with activities such as reading a heavy book. He describes his symptoms as a soreness and tightness through the neck and upper back. This has caused diffculty with sleeping. He has been able to manage his symptoms with self massage, stretching and rolling on a ball, however symptoms are not continuing to resolve. He also notes difficulty with activities such as looking over his left shoulder. 01/05/17 patient report: It's actually feeling much better. I get some popping when I'm stretching my neck, but it's nice to know that I'm not harming anything. The shoulder exercises are actually pretty hard. My neck does seem like it's able to rotate a lot better.   Past Medical History/Comorbidities:   Mr. Feliciano Romero  has a past medical history of Acute sinusitis; Bilateral impacted cerumen; Cerumen impaction; Chronic sinusitis; Conductive hearing loss; Esophageal reflux; Hearing loss; Hearing loss due to cerumen impaction; and Itching of ear. Mr. Ana Gee  has a past surgical history that includes back surgery and other surgical.  Social History/Living Environment:     Patient lives at home with his wife. Prior Level of Function/Work/Activity:  Patient is a . Dominant Side:         RIGHT  Current Medications:    Current Outpatient Prescriptions:     aspirin delayed-release 81 mg tablet, Take 81 mg by mouth., Disp: , Rfl:     cetirizine (ZYRTEC) 10 mg tablet, 10 mg., Disp: , Rfl:     tadalafil (CIALIS) 5 mg tablet, 5 mg., Disp: , Rfl:     ciclopirox (LOPROX) 0.77 % susp, APPLY TO THE AFFECTED TOENAILS TWICE DAILY, Disp: , Rfl: 11   Date Last Reviewed:  1/30/2017   EXAMINATION:   Observation/Orthostatic Postural Assessment:          Mild increase in thoracic kyphosis with forward head posture. Left scapula is mildly elevated. Palpation:          Increased tone is present through left levator and scalenes with tender points noted throughout rhomboids and infraspinatus. ROM:     Cervical extension: 60 °   Cervical flexion: 70 °  Cervical rotation: 30 ° L; 60 ° R  Lateral cervical flexion: 40 ° B with left sided pain on overpressure to L and left sided tightness with overpressure to R  Shoulder mobility is full  Thoracic rotation is mildly restricted      Strength:     Deep cervical flexion: 4/5   Shoulder elevation: 5/5 B  Shoulder ER: 5/5 B  Rhomboid: 4/5 L; 5/5 R  Prone scaption: 4/5 L; 5/5 R      Special Tests:          Spurling's (-)  Neurological Screen:        Myotomes:  WNL throughout upper extremity        Reflexes:  Symmetric to Ue        Neural Tension Tests:  full  Functional Mobility:         independent  Balance: WNL   Body Structures Involved:  1. Bones  2. Joints  3. Muscles Body Functions Affected:  1. Neuromusculoskeletal  2. Movement Related Activities and Participation Affected:  1.  General Tasks and Demands  2. Mobility  3. Domestic Life  4. Community, Social and Civic Life   CLINICAL PRESENTATION:   CLINICAL DECISION MAKING:   Outcome Measure: Tool Used: Modified Oswestry Disability Index  Score:  Initial: 5/50  Most Recent: X/50 (Date: -- )   Interpretation of Score: Each section is scored on a 0-5 scale, 5 representing the greatest disability. The scores of each section are added together for a total score of 50. Score 0 1-10 11-20 21-30 31-40 41-49 50   Modifier CH CI CJ CK CL CM CN       Medical Necessity:   · Patient is expected to demonstrate progress in strength, range of motion, balance and coordination to increase independence with activities that involve movement of the head and to relieve pain. Reason for Services/Other Comments:  · Patient has demonstrated an improvement in functional level by independent performance of HEP. TREATMENT:   (In addition to Assessment/Re-Assessment sessions the following treatments were rendered)  THERAPEUTIC EXERCISE: (see flow sheet below for specific duration minutes):  Exercises per grid below to improve mobility, strength, balance and coordination. Required minimal visual, verbal and manual cues to promote proper body alignment, promote proper body posture and promote proper body mechanics. Progressed resistance, range, repetitions and complexity of movement as indicated. MANUAL THERAPY: (see flow sheet below for specific duration minutes): Joint mobilization, Soft tissue mobilization and Manipulation was utilized and necessary because of the patient's restricted joint motion, painful spasm, loss of articular motion and restricted motion of soft tissue.    MODALITIES: (see flow sheet below for specific duration minutes):      to relieve pain     Date: 01/03/17 01/05/17 (visit 2) 01/09/17 (visit 3) 01/11/17 (visit 4) 01/16/17 (visit 5) 01/19/17 (visit 6) 01/23/17 (visit 7) 01/25/17 (visit 8) 01/30/17 (visit 9)     Modalities: Therapeutic Exercise: 10 min 5 min      5 min      Cervical retraction [id number]             Upper trap stretch To R only [id number]             Sidelying ER 3x10 0-3#             Y, T 2x10 with 5sec hold 0-3#             Foam rolling  Instructed in self mobilization with lacrosse ball to infraspinatus                    tband resistance into cervical extension [id number]      Proprioceptive Activities:                                                        Manual Therapy: 5 min 40 min 40 min 45 min 45 min 45 min 45 min 40 min 45 min     Soft tissue mobilization To upper trap and periscapular musculature performed To cervical spine, thoracic paraspinals and posterior shoulder musculature repeat repeat repeat To cervical spine, thoracic paraspinals, shoulder complex repeat repeat To cervical and thoracic paraspinals with trp release to L at approximately T4-5     Passive accessory mobilizations  Lateral cervical glides L to R grade 3 to 3++ 3x30; scapular distraction grade 3 3x30 repeat Repeat; will add MWM and SNAG next visit repeat Grade 3 to 4 PA mobilizations to C4,5,6 3x30 per segment. repeat repeat Grade 4 to 4++ PA to transverse L T4; C3,4,5 3x30 per segment     Functional Activities:                                                                      HEP: see flow sheet above for specific durationr  Treatment/Session Assessment:   Trigger point present in left thoracic paraspinal with referred pain into the neck. Pain free rotation improves following release of this. .    · Pain/ Symptoms: Initial:   1/10. \"It still seems like it gets irritated pretty easy. \" Post Session:  0/10 ·   Compliance with Program/Exercises: Will assess as treatment progresses. · Recommendations/Intent for next treatment session: \"Next visit will focus on advancements to more challenging activities\".   Total Treatment Duration:  PT Patient Time In/Time Out  Time In: 0800  Time Out: 5945 [facility]

## 2017-02-01 ENCOUNTER — HOSPITAL ENCOUNTER (OUTPATIENT)
Dept: PHYSICAL THERAPY | Age: 67
Discharge: HOME | End: 2017-02-01
Payer: COMMERCIAL

## 2017-02-01 PROCEDURE — 97140 MANUAL THERAPY 1/> REGIONS: CPT

## 2017-02-01 NOTE — PROGRESS NOTES
Hannah Ferrer  : 1950 [id number] [facility],[address] [place] [address].  [phone]   [facility] [phone]        OUTPATIENT PHYSICAL THERAPY:Daily Note and Progress Report 2017      ICD-10: Treatment Diagnosis: neck pain  Precautions/Allergies:   Review of patient's allergies indicates not on file. Fall Risk Score: 1 (? 5 = High Risk)  MD Orders: evaluate and treat MEDICAL/REFERRING DIAGNOSIS:  Neck pain [M54.2]  Acute back pain [M54.9]   DATE OF ONSET: 4 week history  REFERRING PHYSICIAN: Fede Bennett 0120: to be determined     INITIAL ASSESSMENT:  Mr. Carloz Contreras presents to therapy with left sided neck pain that is classified as muscular spasm of the upper trap musculature. He has increased tone and tenderness of the upper trap and levator musculature due to weakness and muscle imbalance of the periscapular musculature. He has no signs of neural involvement or shoulder joint involvement as he has good neural mobility and shoulder mobility. He does have restricted cervical rotation to the left due to protective guarding of the musculature. Skilled therapy is required to return to prior level of function. PROBLEM LIST (Impacting functional limitations):  1. Decreased Strength  2. Decreased ADL/Functional Activities  3. Increased Pain  4. Decreased Activity Tolerance  5. Decreased Flexibility/Joint Mobility INTERVENTIONS PLANNED:  1. Cold  2. Electrical Stimulation  3. Heat  4. Home Exercise Program (HEP)  5. Manual Therapy  6. Neuromuscular Re-education/Strengthening  7. Range of Motion (ROM)  8. Therapeutic Activites  9. Therapeutic Exercise/Strengthening   TREATMENT PLAN:  Effective Dates: 17 TO 17. Frequency/Duration: 2 times a week for 8 weeks  GOALS: (Goals have been discussed and agreed upon with patient.)  Short-Term Functional Goals: Time Frame: 2 weeks  1. Patient will be independent with HEP. MET  2. Patient will report pain <2/10 with daily activity. MET   3. Patient will improve cervical rotation to 40 ° to improve capacity for looking over his shoulder while driving. MET  Discharge Goals: Time Frame: 8 weeks  1. Patient will have no pain with activities such as reading >1 hour. PROGRESSING  2. Patient will improve lower trap strength to 5/5 to normalize muscle stability throughout the scapula and decrease muscular tone. PROGRESSING  3. Patient will improve cervical rotation to 60 ° with no pain at end range. PROGRESSING  Rehabilitation Potential For Stated Goals: Excellent  Regarding Dauna Galeazzi therapy, I certify that the treatment plan above will be carried out by a therapist or under their direction. Thank you for this referral,  Amaury Devine DPT                   HISTORY:   History of Present Injury/Illness (Reason for Referral):  Patient presents to therapy with primary complaint of left sided neck pain. Symptoms have been present over the past few weeks and he attributes their onset to an increase in activities with a forward neck posture such as working on his iPad and reading. He does note increased pain with activities such as reading a heavy book. He describes his symptoms as a soreness and tightness through the neck and upper back. This has caused diffculty with sleeping. He has been able to manage his symptoms with self massage, stretching and rolling on a ball, however symptoms are not continuing to resolve. He also notes difficulty with activities such as looking over his left shoulder. 01/05/17 patient report: It's actually feeling much better. I get some popping when I'm stretching my neck, but it's nice to know that I'm not harming anything. The shoulder exercises are actually pretty hard. My neck does seem like it's able to rotate a lot better.   Past Medical History/Comorbidities:   Mr. Yossi Yates  has a past medical history of Acute sinusitis; Bilateral impacted cerumen; Cerumen impaction; Chronic sinusitis; Conductive hearing loss; Esophageal reflux; Hearing loss; Hearing loss due to cerumen impaction; and Itching of ear. Mr. Cam Barajas  has a past surgical history that includes back surgery and other surgical.  Social History/Living Environment:     Patient lives at home with his wife. Prior Level of Function/Work/Activity:  Patient is a . Dominant Side:         RIGHT  Current Medications:    Current Outpatient Prescriptions:     aspirin delayed-release 81 mg tablet, Take 81 mg by mouth., Disp: , Rfl:     cetirizine (ZYRTEC) 10 mg tablet, 10 mg., Disp: , Rfl:     tadalafil (CIALIS) 5 mg tablet, 5 mg., Disp: , Rfl:     ciclopirox (LOPROX) 0.77 % susp, APPLY TO THE AFFECTED TOENAILS TWICE DAILY, Disp: , Rfl: 11   Date Last Reviewed:  2/1/2017   EXAMINATION:   Observation/Orthostatic Postural Assessment:          Mild increase in thoracic kyphosis with forward head posture. Left scapula is mildly elevated. Palpation:          Increased tone is present through left levator and scalenes with tender points noted throughout rhomboids and infraspinatus.   ROM:     Cervical extension: 60 °   Cervical flexion: 70 °  Cervical rotation: 30 ° L; 60 ° R  Lateral cervical flexion: 40 ° B with left sided pain on overpressure to L and left sided tightness with overpressure to R  Shoulder mobility is full  Thoracic rotation is mildly restricted   02/01/17 UPDATE:   Cervical rotation: 60 ° total; stiff from 40 to 60 ° L  Lateral cervical flexion: 40 ° B, pain free to R; stiff L   Strength:     Deep cervical flexion: 4/5   Shoulder elevation: 5/5 B  Shoulder ER: 5/5 B  Rhomboid: 4/5 L; 5/5 R  Prone scaption: 4/5 L; 5/5 R   Deep cervical flexion: 5/5   Rhomboids: 4+/5 L  Prone scaption; 4+/5 L   Special Tests:          Spurling's (-)  Neurological Screen:        Myotomes:  WNL throughout upper extremity        Reflexes:  Symmetric to Ue        Neural Tension Tests: full  Functional Mobility:         independent  Balance: WNL   Body Structures Involved:  1. Bones  2. Joints  3. Muscles Body Functions Affected:  1. Neuromusculoskeletal  2. Movement Related Activities and Participation Affected:  1. General Tasks and Demands  2. Mobility  3. Domestic Life  4. Community, Social and Civic Life   CLINICAL PRESENTATION:   CLINICAL DECISION MAKING:   Outcome Measure: Tool Used: Modified Oswestry Disability Index  Score:  Initial: 5/50  Most Recent: X/50 (Date: -- )   Interpretation of Score: Each section is scored on a 0-5 scale, 5 representing the greatest disability. The scores of each section are added together for a total score of 50. Score 0 1-10 11-20 21-30 31-40 41-49 50   Modifier CH CI CJ CK CL CM CN       Medical Necessity:   · Patient is expected to demonstrate progress in strength, range of motion, balance and coordination to increase independence with activities that involve movement of the head and to relieve pain. Reason for Services/Other Comments:  · Patient has demonstrated an improvement in functional level by independent performance of HEP. TREATMENT:   (In addition to Assessment/Re-Assessment sessions the following treatments were rendered)  THERAPEUTIC EXERCISE: (see flow sheet below for specific duration minutes):  Exercises per grid below to improve mobility, strength, balance and coordination. Required minimal visual, verbal and manual cues to promote proper body alignment, promote proper body posture and promote proper body mechanics. Progressed resistance, range, repetitions and complexity of movement as indicated. MANUAL THERAPY: (see flow sheet below for specific duration minutes): Joint mobilization, Soft tissue mobilization and Manipulation was utilized and necessary because of the patient's restricted joint motion, painful spasm, loss of articular motion and restricted motion of soft tissue.    MODALITIES: (see flow sheet below for specific duration minutes):      to relieve pain     Date: 01/03/17 01/05/17 (visit 2) 01/09/17 (visit 3) 01/11/17 (visit 4) 01/16/17 (visit 5) 01/19/17 (visit 6) 01/23/17 (visit 7) 01/25/17 (visit 8) 01/30/17 (visit 9) 02/01/17 (visit 10)    Modalities:                                                        Therapeutic Exercise: 10 min 5 min      5 min  5 min    Cervical retraction [id number]             Upper trap stretch To R only [id number]             Sidelying ER 3x10 0-3#             Y, T 2x10 with 5sec hold 0-3#             Foam rolling  Instructed in self mobilization with lacrosse ball to infraspinatus                    tband resistance into cervical extension [id number]  Self SNAG into cervical rotation to the L [id number]    Proprioceptive Activities:                                                        Manual Therapy: 5 min 40 min 40 min 45 min 45 min 45 min 45 min 40 min 45 min 40 min    Soft tissue mobilization To upper trap and periscapular musculature performed To cervical spine, thoracic paraspinals and posterior shoulder musculature repeat repeat repeat To cervical spine, thoracic paraspinals, shoulder complex repeat repeat To cervical and thoracic paraspinals with trp release to L at approximately T4-5 Repeat, with trip release to L at approximately T4-5    Passive accessory mobilizations  Lateral cervical glides L to R grade 3 to 3++ 3x30; scapular distraction grade 3 3x30 repeat Repeat; will add MWM and SNAG next visit repeat Grade 3 to 4 PA mobilizations to C4,5,6 3x30 per segment. repeat repeat Grade 4 to 4++ PA to transverse L T4; C3,4,5 3x30 per segment repeat    Functional Activities:                                                                      HEP: see flow sheet above for specific durationr  Treatment/Session Assessment:   Addition of self SNAG stretch into left rotation helps to decrease stiffness with cervical rotation. · Pain/ Symptoms: Initial:   1/10.  \"I'm feeling good, but it just seems like it doesn't take much to get it irritated. \" Post Session:  0/10 ·   Compliance with Program/Exercises: Will assess as treatment progresses. · Recommendations/Intent for next treatment session: \"Next visit will focus on advancements to more challenging activities\".   Total Treatment Duration:  PT Patient Time In/Time Out  Time In: 0800  Time Out: 209 [facility], [place] [facility]

## 2017-02-06 ENCOUNTER — HOSPITAL ENCOUNTER (OUTPATIENT)
Dept: PHYSICAL THERAPY | Age: 67
Discharge: HOME | End: 2017-02-06
Payer: COMMERCIAL

## 2017-02-06 PROCEDURE — 97140 MANUAL THERAPY 1/> REGIONS: CPT

## 2017-02-06 NOTE — PROGRESS NOTES
Ngoc Gomez  : 1950 [id number] [facility],[address]  [address] [place] [address].  [phone]   [phone]        OUTPATIENT PHYSICAL THERAPY:Daily Note 2017      ICD-10: Treatment Diagnosis: neck pain  Precautions/Allergies:   Review of patient's allergies indicates not on file. Fall Risk Score: 1 (? 5 = High Risk)  MD Orders: evaluate and treat MEDICAL/REFERRING DIAGNOSIS:  Neck pain [M54.2]  Acute back pain [M54.9]   DATE OF ONSET: 4 week history  REFERRING PHYSICIAN: Fede Bennett 8056: to be determined     INITIAL ASSESSMENT:  Mr. Sindy Piedra presents to therapy with left sided neck pain that is classified as muscular spasm of the upper trap musculature. He has increased tone and tenderness of the upper trap and levator musculature due to weakness and muscle imbalance of the periscapular musculature. He has no signs of neural involvement or shoulder joint involvement as he has good neural mobility and shoulder mobility. He does have restricted cervical rotation to the left due to protective guarding of the musculature. Skilled therapy is required to return to prior level of function. PROBLEM LIST (Impacting functional limitations):  1. Decreased Strength  2. Decreased ADL/Functional Activities  3. Increased Pain  4. Decreased Activity Tolerance  5. Decreased Flexibility/Joint Mobility INTERVENTIONS PLANNED:  1. Cold  2. Electrical Stimulation  3. Heat  4. Home Exercise Program (HEP)  5. Manual Therapy  6. Neuromuscular Re-education/Strengthening  7. Range of Motion (ROM)  8. Therapeutic Activites  9. Therapeutic Exercise/Strengthening   TREATMENT PLAN:  Effective Dates: 17 TO 17. Frequency/Duration: 2 times a week for 8 weeks  GOALS: (Goals have been discussed and agreed upon with patient.)  Short-Term Functional Goals: Time Frame: 2 weeks  1. Patient will be independent with HEP. MET  2.  Patient will report pain <2/10 with daily activity. MET   3. Patient will improve cervical rotation to 40 ° to improve capacity for looking over his shoulder while driving. MET  Discharge Goals: Time Frame: 8 weeks  1. Patient will have no pain with activities such as reading >1 hour. PROGRESSING  2. Patient will improve lower trap strength to 5/5 to normalize muscle stability throughout the scapula and decrease muscular tone. PROGRESSING  3. Patient will improve cervical rotation to 60 ° with no pain at end range. PROGRESSING  Rehabilitation Potential For Stated Goals: Excellent  Regarding Greg Lan therapy, I certify that the treatment plan above will be carried out by a therapist or under their direction. Thank you for this referral,  Nancey Boxer, DPT                   HISTORY:   History of Present Injury/Illness (Reason for Referral):  Patient presents to therapy with primary complaint of left sided neck pain. Symptoms have been present over the past few weeks and he attributes their onset to an increase in activities with a forward neck posture such as working on his iPad and reading. He does note increased pain with activities such as reading a heavy book. He describes his symptoms as a soreness and tightness through the neck and upper back. This has caused diffculty with sleeping. He has been able to manage his symptoms with self massage, stretching and rolling on a ball, however symptoms are not continuing to resolve. He also notes difficulty with activities such as looking over his left shoulder. 01/05/17 patient report: It's actually feeling much better. I get some popping when I'm stretching my neck, but it's nice to know that I'm not harming anything. The shoulder exercises are actually pretty hard. My neck does seem like it's able to rotate a lot better.   Past Medical History/Comorbidities:   Mr. Dane Cat  has a past medical history of Acute sinusitis; Bilateral impacted cerumen; Cerumen impaction; Chronic sinusitis; Conductive hearing loss; Esophageal reflux; Hearing loss; Hearing loss due to cerumen impaction; and Itching of ear. Mr. Tod Saucedo  has a past surgical history that includes back surgery and other surgical.  Social History/Living Environment:     Patient lives at home with his wife. Prior Level of Function/Work/Activity:  Patient is a . Dominant Side:         RIGHT  Current Medications:    Current Outpatient Prescriptions:     aspirin delayed-release 81 mg tablet, Take 81 mg by mouth., Disp: , Rfl:     cetirizine (ZYRTEC) 10 mg tablet, 10 mg., Disp: , Rfl:     tadalafil (CIALIS) 5 mg tablet, 5 mg., Disp: , Rfl:     ciclopirox (LOPROX) 0.77 % susp, APPLY TO THE AFFECTED TOENAILS TWICE DAILY, Disp: , Rfl: 11   Date Last Reviewed:  2/6/2017   EXAMINATION:   Observation/Orthostatic Postural Assessment:          Mild increase in thoracic kyphosis with forward head posture. Left scapula is mildly elevated. Palpation:          Increased tone is present through left levator and scalenes with tender points noted throughout rhomboids and infraspinatus.   ROM:     Cervical extension: 60 °   Cervical flexion: 70 °  Cervical rotation: 30 ° L; 60 ° R  Lateral cervical flexion: 40 ° B with left sided pain on overpressure to L and left sided tightness with overpressure to R  Shoulder mobility is full  Thoracic rotation is mildly restricted   02/01/17 UPDATE:   Cervical rotation: 60 ° total; stiff from 40 to 60 ° L  Lateral cervical flexion: 40 ° B, pain free to R; stiff L   Strength:     Deep cervical flexion: 4/5   Shoulder elevation: 5/5 B  Shoulder ER: 5/5 B  Rhomboid: 4/5 L; 5/5 R  Prone scaption: 4/5 L; 5/5 R   Deep cervical flexion: 5/5   Rhomboids: 4+/5 L  Prone scaption; 4+/5 L   Special Tests:          Spurling's (-)  Neurological Screen:        Myotomes:  WNL throughout upper extremity        Reflexes:  Symmetric to Ue        Neural Tension Tests:  full  Functional Mobility: independent  Balance: WNL   Body Structures Involved:  1. Bones  2. Joints  3. Muscles Body Functions Affected:  1. Neuromusculoskeletal  2. Movement Related Activities and Participation Affected:  1. General Tasks and Demands  2. Mobility  3. Domestic Life  4. Community, Social and Civic Life   CLINICAL PRESENTATION:   CLINICAL DECISION MAKING:   Outcome Measure: Tool Used: Modified Oswestry Disability Index  Score:  Initial: 5/50  Most Recent: X/50 (Date: -- )   Interpretation of Score: Each section is scored on a 0-5 scale, 5 representing the greatest disability. The scores of each section are added together for a total score of 50. Score 0 1-10 11-20 21-30 31-40 41-49 50   Modifier CH CI CJ CK CL CM CN       Medical Necessity:   · Patient is expected to demonstrate progress in strength, range of motion, balance and coordination to increase independence with activities that involve movement of the head and to relieve pain. Reason for Services/Other Comments:  · Patient has demonstrated an improvement in functional level by independent performance of HEP. TREATMENT:   (In addition to Assessment/Re-Assessment sessions the following treatments were rendered)  THERAPEUTIC EXERCISE: (see flow sheet below for specific duration minutes):  Exercises per grid below to improve mobility, strength, balance and coordination. Required minimal visual, verbal and manual cues to promote proper body alignment, promote proper body posture and promote proper body mechanics. Progressed resistance, range, repetitions and complexity of movement as indicated. MANUAL THERAPY: (see flow sheet below for specific duration minutes): Joint mobilization, Soft tissue mobilization and Manipulation was utilized and necessary because of the patient's restricted joint motion, painful spasm, loss of articular motion and restricted motion of soft tissue.    MODALITIES: (see flow sheet below for specific duration minutes):      to relieve pain     Date: 01/03/17 01/05/17 (visit 2) 01/09/17 (visit 3) 01/11/17 (visit 4) 01/16/17 (visit 5) 01/19/17 (visit 6) 01/23/17 (visit 7) 01/25/17 (visit 8) 01/30/17 (visit 9) 02/01/17 (visit 10) 02/06/17 (visit 11)   Modalities:                                                        Therapeutic Exercise: 10 min 5 min      5 min  5 min    Cervical retraction [id number]             Upper trap stretch To R only [id number]             Sidelying ER 3x10 0-3#             Y, T 2x10 with 5sec hold 0-3#             Foam rolling  Instructed in self mobilization with lacrosse ball to infraspinatus                    tband resistance into cervical extension [id number]  Self SNAG into cervical rotation to the L [id number]    Proprioceptive Activities:                                                        Manual Therapy: 5 min 40 min 40 min 45 min 45 min 45 min 45 min 40 min 45 min 40 min 45 min   Soft tissue mobilization To upper trap and periscapular musculature performed To cervical spine, thoracic paraspinals and posterior shoulder musculature repeat repeat repeat To cervical spine, thoracic paraspinals, shoulder complex repeat repeat To cervical and thoracic paraspinals with trp release to L at approximately T4-5 Repeat, with trip release to L at approximately T4-5 Repeat, with trp release to L at approximately t4-5   Passive accessory mobilizations  Lateral cervical glides L to R grade 3 to 3++ 3x30; scapular distraction grade 3 3x30 repeat Repeat; will add MWM and SNAG next visit repeat Grade 3 to 4 PA mobilizations to C4,5,6 3x30 per segment. repeat repeat Grade 4 to 4++ PA to transverse L T4; C3,4,5 3x30 per segment repeat repeat   Functional Activities:                                                                      HEP: see flow sheet above for specific durationr  Treatment/Session Assessment:   Continues to have deep trigger point to left thoracic paraspinal however this is becoming less irritated.     · Pain/ Symptoms: Initial: 0/10; \"I feel a lot better. I was actually able to do a lot of reading over the weekend and it didn't really give me any trouble. \" Post Session:  0/10 ·   Compliance with Program/Exercises: Will assess as treatment progresses. · Recommendations/Intent for next treatment session: \"Next visit will focus on advancements to more challenging activities\".   Total Treatment Duration:  PT Patient Time In/Time Out  Time In: 0800  Time Out: 4[address] [facility], [facility]

## 2017-02-08 ENCOUNTER — HOSPITAL ENCOUNTER (OUTPATIENT)
Dept: PHYSICAL THERAPY | Age: 67
Discharge: HOME | End: 2017-02-08
Payer: COMMERCIAL

## 2017-02-08 PROCEDURE — 97140 MANUAL THERAPY 1/> REGIONS: CPT

## 2017-02-08 NOTE — PROGRESS NOTES
Pat Thomason  : [id number] Located within [facility],[address]  [address] [place] [address].  [phone]   [phone]        OUTPATIENT PHYSICAL THERAPY:Daily Note 2017      ICD-10: Treatment Diagnosis: neck pain  Precautions/Allergies:   Review of patient's allergies indicates not on file. Fall Risk Score: 1 (? 5 = High Risk)  MD Orders: evaluate and treat MEDICAL/REFERRING DIAGNOSIS:  Neck pain [M54.2]  Acute back pain [M54.9]   DATE OF ONSET: 4 week history  REFERRING PHYSICIAN: Fede Bennett [id number]: to be determined     INITIAL ASSESSMENT:  Mr. Maurice Chow presents to therapy with left sided neck pain that is classified as muscular spasm of the upper trap musculature. He has increased tone and tenderness of the upper trap and levator musculature due to weakness and muscle imbalance of the periscapular musculature. He has no signs of neural involvement or shoulder joint involvement as he has good neural mobility and shoulder mobility. He does have restricted cervical rotation to the left due to protective guarding of the musculature. Skilled therapy is required to return to prior level of function. PROBLEM LIST (Impacting functional limitations):  1. Decreased Strength  2. Decreased ADL/Functional Activities  3. Increased Pain  4. Decreased Activity Tolerance  5. Decreased Flexibility/Joint Mobility INTERVENTIONS PLANNED:  1. Cold  2. Electrical Stimulation  3. Heat  4. Home Exercise Program (HEP)  5. Manual Therapy  6. Neuromuscular Re-education/Strengthening  7. Range of Motion (ROM)  8. Therapeutic Activites  9. Therapeutic Exercise/Strengthening   TREATMENT PLAN:  Effective Dates: 17 TO 17. Frequency/Duration: 2 times a week for 8 weeks  GOALS: (Goals have been discussed and agreed upon with patient.)  Short-Term Functional Goals: Time Frame: 2 weeks  1. Patient will be independent with HEP. MET  2.  Patient will report pain <2/10 with daily activity. MET   3. Patient will improve cervical rotation to 40 ° to improve capacity for looking over his shoulder while driving. MET  Discharge Goals: Time Frame: 8 weeks  1. Patient will have no pain with activities such as reading >1 hour. PROGRESSING  2. Patient will improve lower trap strength to 5/5 to normalize muscle stability throughout the scapula and decrease muscular tone. PROGRESSING  3. Patient will improve cervical rotation to 60 ° with no pain at end range. PROGRESSING  Rehabilitation Potential For Stated Goals: Excellent  Regarding [employer] therapy, I certify that the treatment plan above will be carried out by a therapist or under their direction. Thank you for this referral,  Adrienne Zambrano DPT                   HISTORY:   History of Present Injury/Illness (Reason for Referral):  Patient presents to therapy with primary complaint of left sided neck pain. Symptoms have been present over the past few weeks and he attributes their onset to an increase in activities with a forward neck posture such as working on his iPad and reading. He does note increased pain with activities such as reading a heavy book. He describes his symptoms as a soreness and tightness through the neck and upper back. This has caused diffculty with sleeping. He has been able to manage his symptoms with self massage, stretching and rolling on a ball, however symptoms are not continuing to resolve. He also notes difficulty with activities such as looking over his left shoulder. 01/05/17 patient report: It's actually feeling much better. I get some popping when I'm stretching my neck, but it's nice to know that I'm not harming anything. The shoulder exercises are actually pretty hard. My neck does seem like it's able to rotate a lot better.   Past Medical History/Comorbidities:   Mr. Don Michael  has a past medical history of Acute sinusitis; Bilateral impacted cerumen; Cerumen impaction; Chronic sinusitis; Conductive hearing loss; Esophageal reflux; Hearing loss; Hearing loss due to cerumen impaction; and Itching of ear. Mr. Maria Guadalupe Oswald  has a past surgical history that includes back surgery and other surgical.  Social History/Living Environment:     Patient lives at home with his wife. Prior Level of Function/Work/Activity:  Patient is a . Dominant Side:         RIGHT  Current Medications:    Current Outpatient Prescriptions:     aspirin delayed-release 81 mg tablet, Take 81 mg by mouth., Disp: , Rfl:     cetirizine (ZYRTEC) 10 mg tablet, 10 mg., Disp: , Rfl:     tadalafil (CIALIS) 5 mg tablet, 5 mg., Disp: , Rfl:     ciclopirox (LOPROX) 0.77 % susp, APPLY TO THE AFFECTED TOENAILS TWICE DAILY, Disp: , Rfl: 11   Date Last Reviewed:  2/8/2017   EXAMINATION:   Observation/Orthostatic Postural Assessment:          Mild increase in thoracic kyphosis with forward head posture. Left scapula is mildly elevated. Palpation:          Increased tone is present through left levator and scalenes with tender points noted throughout rhomboids and infraspinatus.   ROM:     Cervical extension: 60 °   Cervical flexion: 70 °  Cervical rotation: 30 ° L; 60 ° R  Lateral cervical flexion: 40 ° B with left sided pain on overpressure to L and left sided tightness with overpressure to R  Shoulder mobility is full  Thoracic rotation is mildly restricted   02/01/17 UPDATE:   Cervical rotation: 60 ° total; stiff from 40 to 60 ° L  Lateral cervical flexion: 40 ° B, pain free to R; stiff L   Strength:     Deep cervical flexion: 4/5   Shoulder elevation: 5/5 B  Shoulder ER: 5/5 B  Rhomboid: 4/5 L; 5/5 R  Prone scaption: 4/5 L; 5/5 R   Deep cervical flexion: 5/5   Rhomboids: 4+/5 L  Prone scaption; 4+/5 L   Special Tests:          Spurling's (-)  Neurological Screen:        Myotomes:  WNL throughout upper extremity        Reflexes:  Symmetric to Ue        Neural Tension Tests:  full  Functional Mobility: independent  Balance: WNL   Body Structures Involved:  1. Bones  2. Joints  3. Muscles Body Functions Affected:  1. Neuromusculoskeletal  2. Movement Related Activities and Participation Affected:  1. General Tasks and Demands  2. Mobility  3. Domestic Life  4. Community, Social and Civic Life   CLINICAL PRESENTATION:   CLINICAL DECISION MAKING:   Outcome Measure: Tool Used: Modified Oswestry Disability Index  Score:  Initial: 5/50  Most Recent: X/50 (Date: -- )   Interpretation of Score: Each section is scored on a 0-5 scale, 5 representing the greatest disability. The scores of each section are added together for a total score of 50. Score 0 1-10 11-20 21-30 31-40 41-49 50   Modifier CH CI CJ CK CL CM CN       Medical Necessity:   · Patient is expected to demonstrate progress in strength, range of motion, balance and coordination to increase independence with activities that involve movement of the head and to relieve pain. Reason for Services/Other Comments:  · Patient has demonstrated an improvement in functional level by independent performance of HEP. TREATMENT:   (In addition to Assessment/Re-Assessment sessions the following treatments were rendered)  THERAPEUTIC EXERCISE: (see flow sheet below for specific duration minutes):  Exercises per grid below to improve mobility, strength, balance and coordination. Required minimal visual, verbal and manual cues to promote proper body alignment, promote proper body posture and promote proper body mechanics. Progressed resistance, range, repetitions and complexity of movement as indicated. MANUAL THERAPY: (see flow sheet below for specific duration minutes): Joint mobilization, Soft tissue mobilization and Manipulation was utilized and necessary because of the patient's restricted joint motion, painful spasm, loss of articular motion and restricted motion of soft tissue.    MODALITIES: (see flow sheet below for specific duration minutes):      to relieve pain     Date: 01/03/17 01/05/17 (visit 2) 01/09/17 (visit 3) 01/11/17 (visit 4) 01/16/17 (visit 5) 01/19/17 (visit 6) 01/23/17 (visit 7) 01/25/17 (visit 8) 01/30/17 (visit 9) 02/01/17 (visit 10) 02/06/17 (visit 11) 02/08/17 (visit 12)     Modalities:                                                                    Therapeutic Exercise: 10 min 5 min      5 min  5 min       Cervical retraction [id number]                Upper trap stretch To R only [id number]                Sidelying ER 3x10 0-3#                Y, T 2x10 with 5sec hold 0-3#                Foam rolling  Instructed in self mobilization with lacrosse ball to infraspinatus                       tband resistance into cervical extension [id number]  Self SNAG into cervical rotation to the L [id number]       Proprioceptive Activities:                                                                    Manual Therapy: 5 min 40 min 40 min 45 min 45 min 45 min 45 min 40 min 45 min 40 min 45 min 45 min     Soft tissue mobilization To upper trap and periscapular musculature performed To cervical spine, thoracic paraspinals and posterior shoulder musculature repeat repeat repeat To cervical spine, thoracic paraspinals, shoulder complex repeat repeat To cervical and thoracic paraspinals with trp release to L at approximately T4-5 Repeat, with trip release to L at approximately T4-5 Repeat, with trp release to L at approximately t4-5 repeat     Passive accessory mobilizations  Lateral cervical glides L to R grade 3 to 3++ 3x30; scapular distraction grade 3 3x30 repeat Repeat; will add MWM and SNAG next visit repeat Grade 3 to 4 PA mobilizations to C4,5,6 3x30 per segment.   repeat repeat Grade 4 to 4++ PA to transverse L T4; C3,4,5 3x30 per segment repeat repeat repeat     Functional Activities:                                                                                     HEP: see flow sheet above for specific durationr  Treatment/Session Assessment:   He has full active and passive range of motion with only mild discomfort at end range of rotation. · Pain/ Symptoms: Initial: 1/10; \"It's doing much better. I think I'm coping with it a bit better but I'm also more aware of my posture. \" Post Session:  0/10 ·   Compliance with Program/Exercises: Will assess as treatment progresses. · Recommendations/Intent for next treatment session: \"Next visit will focus on advancements to more challenging activities\".   Total Treatment Duration:  PT Patient Time In/Time Out  Time In: 0800  Time Out: 3076 [facility], [facility]

## 2017-02-14 ENCOUNTER — HOSPITAL ENCOUNTER (OUTPATIENT)
Dept: PHYSICAL THERAPY | Age: 67
Discharge: HOME | End: 2017-02-14
Payer: COMMERCIAL

## 2017-02-14 PROCEDURE — 97140 MANUAL THERAPY 1/> REGIONS: CPT

## 2017-02-14 NOTE — PROGRESS NOTES
Saji Fontenot  : 1950 [address] [address].  [phone]   [phone]        OUTPATIENT PHYSICAL THERAPY:Daily Note 2017      ICD-10: Treatment Diagnosis: neck pain  Precautions/Allergies:   Review of patient's allergies indicates not on file. Fall Risk Score: 1 (? 5 = High Risk)  MD Orders: evaluate and treat MEDICAL/REFERRING DIAGNOSIS:  Neck pain [M54.2]  Acute back pain [M54.9]   DATE OF ONSET: 4 week history  REFERRING PHYSICIAN: Fede Bennett 1505: to be determined     INITIAL ASSESSMENT:  Mr. Vick Starr presents to therapy with left sided neck pain that is classified as muscular spasm of the upper trap musculature. He has increased tone and tenderness of the upper trap and levator musculature due to weakness and muscle imbalance of the periscapular musculature. He has no signs of neural involvement or shoulder joint involvement as he has good neural mobility and shoulder mobility. He does have restricted cervical rotation to the left due to protective guarding of the musculature. Skilled therapy is required to return to prior level of function. PROBLEM LIST (Impacting functional limitations):  1. Decreased Strength  2. Decreased ADL/Functional Activities  3. Increased Pain  4. Decreased Activity Tolerance  5. Decreased Flexibility/Joint Mobility INTERVENTIONS PLANNED:  1. Cold  2. Electrical Stimulation  3. Heat  4. Home Exercise Program (HEP)  5. Manual Therapy  6. Neuromuscular Re-education/Strengthening  7. Range of Motion (ROM)  8. Therapeutic Activites  9. Therapeutic Exercise/Strengthening   TREATMENT PLAN:  Effective Dates: 17 TO 17. Frequency/Duration: 2 times a week for 8 weeks  GOALS: (Goals have been discussed and agreed upon with patient.)  Short-Term Functional Goals: Time Frame: 2 weeks  1. Patient will be independent with HEP. MET  2.  Patient will report pain <2/10 with daily activity. MET   3. Patient will improve cervical rotation to 40 ° to improve capacity for looking over his shoulder while driving. MET  Discharge Goals: Time Frame: 8 weeks  1. Patient will have no pain with activities such as reading >1 hour. PROGRESSING  2. Patient will improve lower trap strength to 5/5 to normalize muscle stability throughout the scapula and decrease muscular tone. PROGRESSING  3. Patient will improve cervical rotation to 60 ° with no pain at end range. PROGRESSING  Rehabilitation Potential For Stated Goals: Excellent  Regarding [facility] therapy, I certify that the treatment plan above will be carried out by a therapist or under their direction. Thank you for this referral,  Bettina Foy DPT                   HISTORY:   History of Present Injury/Illness (Reason for Referral):  Patient presents to therapy with primary complaint of left sided neck pain. Symptoms have been present over the past few weeks and he attributes their onset to an increase in activities with a forward neck posture such as working on his iPad and reading. He does note increased pain with activities such as reading a heavy book. He describes his symptoms as a soreness and tightness through the neck and upper back. This has caused diffculty with sleeping. He has been able to manage his symptoms with self massage, stretching and rolling on a ball, however symptoms are not continuing to resolve. He also notes difficulty with activities such as looking over his left shoulder. 01/05/17 patient report: It's actually feeling much better. I get some popping when I'm stretching my neck, but it's nice to know that I'm not harming anything. The shoulder exercises are actually pretty hard. My neck does seem like it's able to rotate a lot better.   Past Medical History/Comorbidities:   Mr. Sandra Mabry  has a past medical history of Acute sinusitis; Bilateral impacted cerumen; Cerumen impaction; Chronic sinusitis; Conductive hearing loss; Esophageal reflux; Hearing loss; Hearing loss due to cerumen impaction; and Itching of ear. Mr. Harriett Patrick  has a past surgical history that includes back surgery and other surgical.  Social History/Living Environment:     Patient lives at home with his wife. Prior Level of Function/Work/Activity:  Patient is a . Dominant Side:         RIGHT  Current Medications:    Current Outpatient Prescriptions:     aspirin delayed-release 81 mg tablet, Take 81 mg by mouth., Disp: , Rfl:     cetirizine (ZYRTEC) 10 mg tablet, 10 mg., Disp: , Rfl:     tadalafil (CIALIS) 5 mg tablet, 5 mg., Disp: , Rfl:     ciclopirox (LOPROX) 0.77 % susp, APPLY TO THE AFFECTED TOENAILS TWICE DAILY, Disp: , Rfl: 11   Date Last Reviewed:  2/14/2017   EXAMINATION:   Observation/Orthostatic Postural Assessment:          Mild increase in thoracic kyphosis with forward head posture. Left scapula is mildly elevated. Palpation:          Increased tone is present through left levator and scalenes with tender points noted throughout rhomboids and infraspinatus.   ROM:     Cervical extension: 60 °   Cervical flexion: 70 °  Cervical rotation: 30 ° L; 60 ° R  Lateral cervical flexion: 40 ° B with left sided pain on overpressure to L and left sided tightness with overpressure to R  Shoulder mobility is full  Thoracic rotation is mildly restricted   02/01/17 UPDATE:   Cervical rotation: 60 ° total; stiff from 40 to 60 ° L  Lateral cervical flexion: 40 ° B, pain free to R; stiff L   Strength:     Deep cervical flexion: 4/5   Shoulder elevation: 5/5 B  Shoulder ER: 5/5 B  Rhomboid: 4/5 L; 5/5 R  Prone scaption: 4/5 L; 5/5 R   Deep cervical flexion: 5/5   Rhomboids: 4+/5 L  Prone scaption; 4+/5 L   Special Tests:          Spurling's (-)  Neurological Screen:        Myotomes:  WNL throughout upper extremity        Reflexes:  Symmetric to Ue        Neural Tension Tests:  full  Functional Mobility: independent  Balance: WNL   Body Structures Involved:  1. Bones  2. Joints  3. Muscles Body Functions Affected:  1. Neuromusculoskeletal  2. Movement Related Activities and Participation Affected:  1. General Tasks and Demands  2. Mobility  3. Domestic Life  4. Community, Social and Civic Life   CLINICAL PRESENTATION:   CLINICAL DECISION MAKING:   Outcome Measure: Tool Used: Modified Oswestry Disability Index  Score:  Initial: 5/50  Most Recent: X/50 (Date: -- )   Interpretation of Score: Each section is scored on a 0-5 scale, 5 representing the greatest disability. The scores of each section are added together for a total score of 50. Score 0 1-10 11-20 21-30 31-40 41-49 50   Modifier CH CI CJ CK CL CM CN       Medical Necessity:   · Patient is expected to demonstrate progress in strength, range of motion, balance and coordination to increase independence with activities that involve movement of the head and to relieve pain. Reason for Services/Other Comments:  · Patient has demonstrated an improvement in functional level by independent performance of HEP. TREATMENT:   (In addition to Assessment/Re-Assessment sessions the following treatments were rendered)  THERAPEUTIC EXERCISE: (see flow sheet below for specific duration minutes):  Exercises per grid below to improve mobility, strength, balance and coordination. Required minimal visual, verbal and manual cues to promote proper body alignment, promote proper body posture and promote proper body mechanics. Progressed resistance, range, repetitions and complexity of movement as indicated. MANUAL THERAPY: (see flow sheet below for specific duration minutes): Joint mobilization, Soft tissue mobilization and Manipulation was utilized and necessary because of the patient's restricted joint motion, painful spasm, loss of articular motion and restricted motion of soft tissue.    MODALITIES: (see flow sheet below for specific duration minutes):      to relieve pain     Date: 01/03/17 01/05/17 (visit 2) 01/09/17 (visit 3) 01/11/17 (visit 4) 01/16/17 (visit 5) 01/19/17 (visit 6) 01/23/17 (visit 7) 01/25/17 (visit 8) 01/30/17 (visit 9) 02/01/17 (visit 10) 02/06/17 (visit 11) 02/08/17 (visit 12) 02/14/17 (visit 13)    Modalities:                                                                    Therapeutic Exercise: 10 min 5 min      5 min  5 min       Cervical retraction [id number]                Upper trap stretch To R only [id number]                Sidelying ER 3x10 0-3#                Y, T 2x10 with 5sec hold 0-3#                Foam rolling  Instructed in self mobilization with lacrosse ball to infraspinatus                       tband resistance into cervical extension [id number]  Self SNAG into cervical rotation to the L [id number]       Proprioceptive Activities:                                                                    Manual Therapy: 5 min 40 min 40 min 45 min 45 min 45 min 45 min 40 min 45 min 40 min 45 min 45 min 45 min    Soft tissue mobilization To upper trap and periscapular musculature performed To cervical spine, thoracic paraspinals and posterior shoulder musculature repeat repeat repeat To cervical spine, thoracic paraspinals, shoulder complex repeat repeat To cervical and thoracic paraspinals with trp release to L at approximately T4-5 Repeat, with trip release to L at approximately T4-5 Repeat, with trp release to L at approximately t4-5 repeat STM with trigger point release and aggressive deep tissue to the thoracic spine     Passive accessory mobilizations  Lateral cervical glides L to R grade 3 to 3++ 3x30; scapular distraction grade 3 3x30 repeat Repeat; will add MWM and SNAG next visit repeat Grade 3 to 4 PA mobilizations to C4,5,6 3x30 per segment.   repeat repeat Grade 4 to 4++ PA to transverse L T4; C3,4,5 3x30 per segment repeat repeat repeat repeat    Functional Activities: HEP: see flow sheet above for specific durationr  Treatment/Session Assessment:   Continued tender points are present through the thoracic paraspinals with referred pain into the left cervical spine. · Pain/ Symptoms: Initial: 1/10; \"It's not nearly as easily irritable. I've become much more conscious of my posture. I can still feel it a bit when I fully rotate my head to the left. \" Post Session:  0/10 ·   Compliance with Program/Exercises: Will assess as treatment progresses. · Recommendations/Intent for next treatment session: \"Next visit will focus on advancements to more challenging activities\".   Total Treatment Duration:  PT Patient Time In/Time Out  Time In: 1615  Time Out: 45 Sandra Wagoner, DPT

## 2017-02-17 ENCOUNTER — HOSPITAL ENCOUNTER (OUTPATIENT)
Dept: PHYSICAL THERAPY | Age: 67
Discharge: HOME | End: 2017-02-17
Payer: COMMERCIAL

## 2017-02-17 PROCEDURE — 97140 MANUAL THERAPY 1/> REGIONS: CPT

## 2017-02-17 NOTE — PROGRESS NOTES
Ayan Krishnan  : 1950 [id number] [facility],[address]  [address] [place] [address].  [phone]   [phone]        OUTPATIENT PHYSICAL THERAPY:Daily Note 2017      ICD-10: Treatment Diagnosis: neck pain  Precautions/Allergies:   Review of patient's allergies indicates not on file. Fall Risk Score: 1 (? 5 = High Risk)  MD Orders: evaluate and treat MEDICAL/REFERRING DIAGNOSIS:  Neck pain [M54.2]  Acute back pain [M54.9]   DATE OF ONSET: 4 week history  REFERRING PHYSICIAN: Fede Bennett 1436: to be determined     INITIAL ASSESSMENT:  Mr. Maria Guadalupe Oswald presents to therapy with left sided neck pain that is classified as muscular spasm of the upper trap musculature. He has increased tone and tenderness of the upper trap and levator musculature due to weakness and muscle imbalance of the periscapular musculature. He has no signs of neural involvement or shoulder joint involvement as he has good neural mobility and shoulder mobility. He does have restricted cervical rotation to the left due to protective guarding of the musculature. Skilled therapy is required to return to prior level of function. PROBLEM LIST (Impacting functional limitations):  1. Decreased Strength  2. Decreased ADL/Functional Activities  3. Increased Pain  4. Decreased Activity Tolerance  5. Decreased Flexibility/Joint Mobility INTERVENTIONS PLANNED:  1. Cold  2. Electrical Stimulation  3. Heat  4. Home Exercise Program (HEP)  5. Manual Therapy  6. Neuromuscular Re-education/Strengthening  7. Range of Motion (ROM)  8. Therapeutic Activites  9. Therapeutic Exercise/Strengthening   TREATMENT PLAN:  Effective Dates: 17 TO 17. Frequency/Duration: 2 times a week for 8 weeks  GOALS: (Goals have been discussed and agreed upon with patient.)  Short-Term Functional Goals: Time Frame: 2 weeks  1. Patient will be independent with HEP. MET  2.  Patient will report pain <2/10 with daily activity. MET   3. Patient will improve cervical rotation to 40 ° to improve capacity for looking over his shoulder while driving. MET  Discharge Goals: Time Frame: 8 weeks  1. Patient will have no pain with activities such as reading >1 hour. PROGRESSING  2. Patient will improve lower trap strength to 5/5 to normalize muscle stability throughout the scapula and decrease muscular tone. PROGRESSING  3. Patient will improve cervical rotation to 60 ° with no pain at end range. PROGRESSING  Rehabilitation Potential For Stated Goals: Excellent  Regarding Daryrl Arroyo therapy, I certify that the treatment plan above will be carried out by a therapist or under their direction. Thank you for this referral,  Shraddha Willams, DPT                   HISTORY:   History of Present Injury/Illness (Reason for Referral):  Patient presents to therapy with primary complaint of left sided neck pain. Symptoms have been present over the past few weeks and he attributes their onset to an increase in activities with a forward neck posture such as working on his iPad and reading. He does note increased pain with activities such as reading a heavy book. He describes his symptoms as a soreness and tightness through the neck and upper back. This has caused diffculty with sleeping. He has been able to manage his symptoms with self massage, stretching and rolling on a ball, however symptoms are not continuing to resolve. He also notes difficulty with activities such as looking over his left shoulder. 01/05/17 patient report: It's actually feeling much better. I get some popping when I'm stretching my neck, but it's nice to know that I'm not harming anything. The shoulder exercises are actually pretty hard. My neck does seem like it's able to rotate a lot better.   Past Medical History/Comorbidities:   Mr. Rhona Stokes  has a past medical history of Acute sinusitis; Bilateral impacted cerumen; Cerumen impaction; Chronic sinusitis; Conductive hearing loss; Esophageal reflux; Hearing loss; Hearing loss due to cerumen impaction; and Itching of ear. Mr. Don Michael  has a past surgical history that includes back surgery and other surgical.  Social History/Living Environment:     Patient lives at home with his wife. Prior Level of Function/Work/Activity:  Patient is a . Dominant Side:         RIGHT  Current Medications:    Current Outpatient Prescriptions:     aspirin delayed-release 81 mg tablet, Take 81 mg by mouth., Disp: , Rfl:     cetirizine (ZYRTEC) 10 mg tablet, 10 mg., Disp: , Rfl:     tadalafil (CIALIS) 5 mg tablet, 5 mg., Disp: , Rfl:     ciclopirox (LOPROX) 0.77 % susp, APPLY TO THE AFFECTED TOENAILS TWICE DAILY, Disp: , Rfl: 11   Date Last Reviewed:  2/17/2017   EXAMINATION:   Observation/Orthostatic Postural Assessment:          Mild increase in thoracic kyphosis with forward head posture. Left scapula is mildly elevated. Palpation:          Increased tone is present through left levator and scalenes with tender points noted throughout rhomboids and infraspinatus.   ROM:     Cervical extension: 60 °   Cervical flexion: 70 °  Cervical rotation: 30 ° L; 60 ° R  Lateral cervical flexion: 40 ° B with left sided pain on overpressure to L and left sided tightness with overpressure to R  Shoulder mobility is full  Thoracic rotation is mildly restricted   02/01/17 UPDATE:   Cervical rotation: 60 ° total; stiff from 40 to 60 ° L  Lateral cervical flexion: 40 ° B, pain free to R; stiff L   Strength:     Deep cervical flexion: 4/5   Shoulder elevation: 5/5 B  Shoulder ER: 5/5 B  Rhomboid: 4/5 L; 5/5 R  Prone scaption: 4/5 L; 5/5 R   Deep cervical flexion: 5/5   Rhomboids: 4+/5 L  Prone scaption; 4+/5 L   Special Tests:          Spurling's (-)  Neurological Screen:        Myotomes:  WNL throughout upper extremity        Reflexes:  Symmetric to Ue        Neural Tension Tests:  full  Functional Mobility: independent  Balance: WNL   Body Structures Involved:  1. Bones  2. Joints  3. Muscles Body Functions Affected:  1. Neuromusculoskeletal  2. Movement Related Activities and Participation Affected:  1. General Tasks and Demands  2. Mobility  3. Domestic Life  4. Community, Social and Civic Life   CLINICAL PRESENTATION:   CLINICAL DECISION MAKING:   Outcome Measure: Tool Used: Modified Oswestry Disability Index  Score:  Initial: 5/50  Most Recent: X/50 (Date: -- )   Interpretation of Score: Each section is scored on a 0-5 scale, 5 representing the greatest disability. The scores of each section are added together for a total score of 50. Score 0 1-10 11-20 21-30 31-40 41-49 50   Modifier CH CI CJ CK CL CM CN       Medical Necessity:   · Patient is expected to demonstrate progress in strength, range of motion, balance and coordination to increase independence with activities that involve movement of the head and to relieve pain. Reason for Services/Other Comments:  · Patient has demonstrated an improvement in functional level by independent performance of HEP. TREATMENT:   (In addition to Assessment/Re-Assessment sessions the following treatments were rendered)  THERAPEUTIC EXERCISE: (see flow sheet below for specific duration minutes):  Exercises per grid below to improve mobility, strength, balance and coordination. Required minimal visual, verbal and manual cues to promote proper body alignment, promote proper body posture and promote proper body mechanics. Progressed resistance, range, repetitions and complexity of movement as indicated. MANUAL THERAPY: (see flow sheet below for specific duration minutes): Joint mobilization, Soft tissue mobilization and Manipulation was utilized and necessary because of the patient's restricted joint motion, painful spasm, loss of articular motion and restricted motion of soft tissue.    MODALITIES: (see flow sheet below for specific duration minutes):      to relieve pain     Date: 01/03/17 01/05/17 (visit 2) 01/09/17 (visit 3) 01/11/17 (visit 4) 01/16/17 (visit 5) 01/19/17 (visit 6) 01/23/17 (visit 7) 01/25/17 (visit 8) 01/30/17 (visit 9) 02/01/17 (visit 10) 02/06/17 (visit 11) 02/08/17 (visit 12) 02/14/17 (visit 13) 02/17/17 (visit 14)   Modalities:                                                                    Therapeutic Exercise: 10 min 5 min      5 min  5 min       Cervical retraction [id number]                Upper trap stretch To R only [id number]                Sidelying ER 3x10 0-3#                Y, T 2x10 with 5sec hold 0-3#                Foam rolling  Instructed in self mobilization with lacrosse ball to infraspinatus                       tband resistance into cervical extension [id number]  Self SNAG into cervical rotation to the L [id number]       Proprioceptive Activities:                                                                    Manual Therapy: 5 min 40 min 40 min 45 min 45 min 45 min 45 min 40 min 45 min 40 min 45 min 45 min 45 min 45 min   Soft tissue mobilization To upper trap and periscapular musculature performed To cervical spine, thoracic paraspinals and posterior shoulder musculature repeat repeat repeat To cervical spine, thoracic paraspinals, shoulder complex repeat repeat To cervical and thoracic paraspinals with trp release to L at approximately T4-5 Repeat, with trip release to L at approximately T4-5 Repeat, with trp release to L at approximately t4-5 repeat STM with trigger point release and aggressive deep tissue to the thoracic spine  Repeat; STM to cervical spine with manual stretching into R lateral cervical flexoin   Passive accessory mobilizations  Lateral cervical glides L to R grade 3 to 3++ 3x30; scapular distraction grade 3 3x30 repeat Repeat; will add MWM and SNAG next visit repeat Grade 3 to 4 PA mobilizations to C4,5,6 3x30 per segment.   repeat repeat Grade 4 to 4++ PA to transverse L T4; C3,4,5 3x30 per segment repeat repeat repeat repeat Grade 4 to 4+ lateral glide to cervical spine throughout. Functional Activities:                                                                                     HEP: see flow sheet above for specific durationr  Treatment/Session Assessment:   Addition of manual stretching with STM to the cervical spine improves mobility and decreases pain. No symptoms are noted with mobilization of the first rib or lateral gliding of the cervical spine. · Pain/ Symptoms: Initial: 1/10; \"I was at a dinner on Wednesday night and had to have my head turned all night and it's been really sore. \" Post Session:  0/10 ·   Compliance with Program/Exercises: Will assess as treatment progresses. · Recommendations/Intent for next treatment session: \"Next visit will focus on advancements to more challenging activities\".   Total Treatment Duration:  PT Patient Time In/Time Out  Time In: 0800  Time Out: 214 [facility], [facility]

## 2017-02-22 ENCOUNTER — HOSPITAL ENCOUNTER (OUTPATIENT)
Dept: PHYSICAL THERAPY | Age: 67
Discharge: HOME | End: 2017-02-22
Payer: COMMERCIAL

## 2017-02-22 PROCEDURE — 97140 MANUAL THERAPY 1/> REGIONS: CPT

## 2017-02-22 NOTE — PROGRESS NOTES
Ngoc Gomez  : 1950 [id number] [facility],[address]  [facility] [address]  [phone]   [phone]        OUTPATIENT PHYSICAL THERAPY:Daily Note 2017      ICD-10: Treatment Diagnosis: neck pain  Precautions/Allergies:   Review of patient's allergies indicates not on file. Fall Risk Score: 1 (? 5 = High Risk)  MD Orders: evaluate and treat MEDICAL/REFERRING DIAGNOSIS:  Neck pain [M54.2]  Acute back pain [M54.9]   DATE OF ONSET: 4 week history  REFERRING PHYSICIAN: Fdee Bennett 1178: to be determined     INITIAL ASSESSMENT:  Mr. Sindy Piedra presents to therapy with left sided neck pain that is classified as muscular spasm of the upper trap musculature. He has increased tone and tenderness of the upper trap and levator musculature due to weakness and muscle imbalance of the periscapular musculature. He has no signs of neural involvement or shoulder joint involvement as he has good neural mobility and shoulder mobility. He does have restricted cervical rotation to the left due to protective guarding of the musculature. Skilled therapy is required to return to prior level of function. PROBLEM LIST (Impacting functional limitations):  1. Decreased Strength  2. Decreased ADL/Functional Activities  3. Increased Pain  4. Decreased Activity Tolerance  5. Decreased Flexibility/Joint Mobility INTERVENTIONS PLANNED:  1. Cold  2. Electrical Stimulation  3. Heat  4. Home Exercise Program (HEP)  5. Manual Therapy  6. Neuromuscular Re-education/Strengthening  7. Range of Motion (ROM)  8. Therapeutic Activites  9. Therapeutic Exercise/Strengthening   TREATMENT PLAN:  Effective Dates: 17 TO 17. Frequency/Duration: 2 times a week for 8 weeks  GOALS: (Goals have been discussed and agreed upon with patient.)  Short-Term Functional Goals: Time Frame: 2 weeks  1. Patient will be independent with HEP. MET  2.  Patient will report pain <2/10 with daily activity. MET   3. Patient will improve cervical rotation to 40 ° to improve capacity for looking over his shoulder while driving. MET  Discharge Goals: Time Frame: 8 weeks  1. Patient will have no pain with activities such as reading >1 hour. PROGRESSING  2. Patient will improve lower trap strength to 5/5 to normalize muscle stability throughout the scapula and decrease muscular tone. PROGRESSING  3. Patient will improve cervical rotation to 60 ° with no pain at end range. PROGRESSING  Rehabilitation Potential For Stated Goals: Excellent  Regarding Radha List therapy, I certify that the treatment plan above will be carried out by a therapist or under their direction. Thank you for this referral,  Nataly Riley DPT                   HISTORY:   History of Present Injury/Illness (Reason for Referral):  Patient presents to therapy with primary complaint of left sided neck pain. Symptoms have been present over the past few weeks and he attributes their onset to an increase in activities with a forward neck posture such as working on his iPad and reading. He does note increased pain with activities such as reading a heavy book. He describes his symptoms as a soreness and tightness through the neck and upper back. This has caused diffculty with sleeping. He has been able to manage his symptoms with self massage, stretching and rolling on a ball, however symptoms are not continuing to resolve. He also notes difficulty with activities such as looking over his left shoulder. 01/05/17 patient report: It's actually feeling much better. I get some popping when I'm stretching my neck, but it's nice to know that I'm not harming anything. The shoulder exercises are actually pretty hard. My neck does seem like it's able to rotate a lot better.   Past Medical History/Comorbidities:   Mr. Cam Barajas  has a past medical history of Acute sinusitis; Bilateral impacted cerumen; Cerumen impaction; Chronic sinusitis; Conductive hearing loss; Esophageal reflux; Hearing loss; Hearing loss due to cerumen impaction; and Itching of ear. Mr. Feliciano Romero  has a past surgical history that includes back surgery and other surgical.  Social History/Living Environment:     Patient lives at home with his wife. Prior Level of Function/Work/Activity:  Patient is a . Dominant Side:         RIGHT  Current Medications:    Current Outpatient Prescriptions:     aspirin delayed-release 81 mg tablet, Take 81 mg by mouth., Disp: , Rfl:     cetirizine (ZYRTEC) 10 mg tablet, 10 mg., Disp: , Rfl:     tadalafil (CIALIS) 5 mg tablet, 5 mg., Disp: , Rfl:     ciclopirox (LOPROX) 0.77 % susp, APPLY TO THE AFFECTED TOENAILS TWICE DAILY, Disp: , Rfl: 11   Date Last Reviewed:  2/22/2017   EXAMINATION:   Observation/Orthostatic Postural Assessment:          Mild increase in thoracic kyphosis with forward head posture. Left scapula is mildly elevated. Palpation:          Increased tone is present through left levator and scalenes with tender points noted throughout rhomboids and infraspinatus.   ROM:     Cervical extension: 60 °   Cervical flexion: 70 °  Cervical rotation: 30 ° L; 60 ° R  Lateral cervical flexion: 40 ° B with left sided pain on overpressure to L and left sided tightness with overpressure to R  Shoulder mobility is full  Thoracic rotation is mildly restricted   02/01/17 UPDATE:   Cervical rotation: 60 ° total; stiff from 40 to 60 ° L  Lateral cervical flexion: 40 ° B, pain free to R; stiff L   Strength:     Deep cervical flexion: 4/5   Shoulder elevation: 5/5 B  Shoulder ER: 5/5 B  Rhomboid: 4/5 L; 5/5 R  Prone scaption: 4/5 L; 5/5 R   Deep cervical flexion: 5/5   Rhomboids: 4+/5 L  Prone scaption; 4+/5 L   Special Tests:          Spurling's (-)  Neurological Screen:        Myotomes:  WNL throughout upper extremity        Reflexes:  Symmetric to Ue        Neural Tension Tests:  full  Functional Mobility: independent  Balance: WNL   Body Structures Involved:  1. Bones  2. Joints  3. Muscles Body Functions Affected:  1. Neuromusculoskeletal  2. Movement Related Activities and Participation Affected:  1. General Tasks and Demands  2. Mobility  3. Domestic Life  4. Community, Social and Civic Life   CLINICAL PRESENTATION:   CLINICAL DECISION MAKING:   Outcome Measure: Tool Used: Modified Oswestry Disability Index  Score:  Initial: 5/50  Most Recent: X/50 (Date: -- )   Interpretation of Score: Each section is scored on a 0-5 scale, 5 representing the greatest disability. The scores of each section are added together for a total score of 50. Score 0 1-10 11-20 21-30 31-40 41-49 50   Modifier CH CI CJ CK CL CM CN       Medical Necessity:   · Patient is expected to demonstrate progress in strength, range of motion, balance and coordination to increase independence with activities that involve movement of the head and to relieve pain. Reason for Services/Other Comments:  · Patient has demonstrated an improvement in functional level by independent performance of HEP. TREATMENT:   (In addition to Assessment/Re-Assessment sessions the following treatments were rendered)  THERAPEUTIC EXERCISE: (see flow sheet below for specific duration minutes):  Exercises per grid below to improve mobility, strength, balance and coordination. Required minimal visual, verbal and manual cues to promote proper body alignment, promote proper body posture and promote proper body mechanics. Progressed resistance, range, repetitions and complexity of movement as indicated. MANUAL THERAPY: (see flow sheet below for specific duration minutes): Joint mobilization, Soft tissue mobilization and Manipulation was utilized and necessary because of the patient's restricted joint motion, painful spasm, loss of articular motion and restricted motion of soft tissue.    MODALITIES: (see flow sheet below for specific duration minutes):      to relieve pain     Date: 01/25/17 (visit 8) 01/30/17 (visit 9) 02/01/17 (visit 10) 02/06/17 (visit 11) 02/08/17 (visit 12) 02/14/17 (visit 13) 02/17/17 (visit 14) 02/22/17 (visit 15)     Modalities:                                                    Therapeutic Exercise: 5 min  5 min          Cervical retraction             Upper trap stretch             Sidelying ER             Y, T             Foam rolling              tband resistance into cervical extension [id number]  Self SNAG into cervical rotation to the L [id number]          Proprioceptive Activities:                                                    Manual Therapy: 40 min 45 min 40 min 45 min 45 min 45 min 45 min 45 min     Soft tissue mobilization repeat To cervical and thoracic paraspinals with trp release to L at approximately T4-5 Repeat, with trip release to L at approximately T4-5 Repeat, with trp release to L at approximately t4-5 repeat STM with trigger point release and aggressive deep tissue to the thoracic spine  Repeat; STM to cervical spine with manual stretching into R lateral cervical flexoin Repeat, TRP release to thoracic paraspinals, PA mobilizations to lateral thoracic spine grade 3 to 4++ L T1 to 6     Passive accessory mobilizations repeat Grade 4 to 4++ PA to transverse L T4; C3,4,5 3x30 per segment repeat repeat repeat repeat Grade 4 to 4+ lateral glide to cervical spine throughout. Repeat, manual retraction with lateral flexion to L 10x     Functional Activities:                                                                 HEP: see flow sheet above for specific durationr  Treatment/Session Assessment:   Stiffness remains present with PA mobilizations of lateral thoracic transverse processes. STM to cervical paraspinals with manual stretch into lateral flexion improves pain free mobility. · Pain/ Symptoms: Initial: 1/10; \"It hasn't quite settled down. \" Post Session:  0/10 ·   Compliance with Program/Exercises:  Will assess as treatment progresses. · Recommendations/Intent for next treatment session: \"Next visit will focus on advancements to more challenging activities\".   Total Treatment Duration:  PT Patient Time In/Time Out  Time In: 0845  Time Out: 915 [facility], [facility]

## 2017-02-24 ENCOUNTER — HOSPITAL ENCOUNTER (OUTPATIENT)
Dept: PHYSICAL THERAPY | Age: 67
Discharge: HOME | End: 2017-02-24
Payer: COMMERCIAL

## 2017-02-24 PROCEDURE — 97140 MANUAL THERAPY 1/> REGIONS: CPT

## 2017-02-24 NOTE — PROGRESS NOTES
Shyanne Sosa  : 1950 [id number] [facility],[address]  [address] [place] [address].  [phone]   [phone]        OUTPATIENT PHYSICAL THERAPY:Daily Note 2017      ICD-10: Treatment Diagnosis: neck pain  Precautions/Allergies:   Review of patient's allergies indicates not on file. Fall Risk Score: 1 (? 5 = High Risk)  MD Orders: evaluate and treat MEDICAL/REFERRING DIAGNOSIS:  Neck pain [M54.2]  Acute back pain [M54.9]   DATE OF ONSET: 4 week history  REFERRING PHYSICIAN: Fede Bennett [id number]: to be determined     INITIAL ASSESSMENT:  Mr. Emilia Reddy presents to therapy with left sided neck pain that is classified as muscular spasm of the upper trap musculature. He has increased tone and tenderness of the upper trap and levator musculature due to weakness and muscle imbalance of the periscapular musculature. He has no signs of neural involvement or shoulder joint involvement as he has good neural mobility and shoulder mobility. He does have restricted cervical rotation to the left due to protective guarding of the musculature. Skilled therapy is required to return to prior level of function. PROBLEM LIST (Impacting functional limitations):  1. Decreased Strength  2. Decreased ADL/Functional Activities  3. Increased Pain  4. Decreased Activity Tolerance  5. Decreased Flexibility/Joint Mobility INTERVENTIONS PLANNED:  1. Cold  2. Electrical Stimulation  3. Heat  4. Home Exercise Program (HEP)  5. Manual Therapy  6. Neuromuscular Re-education/Strengthening  7. Range of Motion (ROM)  8. Therapeutic Activites  9. Therapeutic Exercise/Strengthening   TREATMENT PLAN:  Effective Dates: 17 TO 17. Frequency/Duration: 2 times a week for 8 weeks  GOALS: (Goals have been discussed and agreed upon with patient.)  Short-Term Functional Goals: Time Frame: 2 weeks  1. Patient will be independent with HEP. MET  2.  Patient will report pain <2/10 with daily activity. MET   3. Patient will improve cervical rotation to 40 ° to improve capacity for looking over his shoulder while driving. MET  Discharge Goals: Time Frame: 8 weeks  1. Patient will have no pain with activities such as reading >1 hour. PROGRESSING  2. Patient will improve lower trap strength to 5/5 to normalize muscle stability throughout the scapula and decrease muscular tone. PROGRESSING  3. Patient will improve cervical rotation to 60 ° with no pain at end range. PROGRESSING  Rehabilitation Potential For Stated Goals: Excellent  Regarding Nuvia Crouch therapy, I certify that the treatment plan above will be carried out by a therapist or under their direction. Thank you for this referral,  Larose Dubin, FIORDALIZA                   HISTORY:   History of Present Injury/Illness (Reason for Referral):  Patient presents to therapy with primary complaint of left sided neck pain. Symptoms have been present over the past few weeks and he attributes their onset to an increase in activities with a forward neck posture such as working on his iPad and reading. He does note increased pain with activities such as reading a heavy book. He describes his symptoms as a soreness and tightness through the neck and upper back. This has caused diffculty with sleeping. He has been able to manage his symptoms with self massage, stretching and rolling on a ball, however symptoms are not continuing to resolve. He also notes difficulty with activities such as looking over his left shoulder. 01/05/17 patient report: It's actually feeling much better. I get some popping when I'm stretching my neck, but it's nice to know that I'm not harming anything. The shoulder exercises are actually pretty hard. My neck does seem like it's able to rotate a lot better.   Past Medical History/Comorbidities:   Mr. Brian Muñoz  has a past medical history of Acute sinusitis; Bilateral impacted cerumen; Cerumen impaction; Chronic sinusitis; Conductive hearing loss; Esophageal reflux; Hearing loss; Hearing loss due to cerumen impaction; and Itching of ear. Mr. Early Gentleman  has a past surgical history that includes back surgery and other surgical.  Social History/Living Environment:     Patient lives at home with his wife. Prior Level of Function/Work/Activity:  Patient is a . Dominant Side:         RIGHT  Current Medications:    Current Outpatient Prescriptions:     aspirin delayed-release 81 mg tablet, Take 81 mg by mouth., Disp: , Rfl:     cetirizine (ZYRTEC) 10 mg tablet, 10 mg., Disp: , Rfl:     tadalafil (CIALIS) 5 mg tablet, 5 mg., Disp: , Rfl:     ciclopirox (LOPROX) 0.77 % susp, APPLY TO THE AFFECTED TOENAILS TWICE DAILY, Disp: , Rfl: 11   Date Last Reviewed:  2/24/2017   EXAMINATION:   Observation/Orthostatic Postural Assessment:          Mild increase in thoracic kyphosis with forward head posture. Left scapula is mildly elevated. Palpation:          Increased tone is present through left levator and scalenes with tender points noted throughout rhomboids and infraspinatus.   ROM:     Cervical extension: 60 °   Cervical flexion: 70 °  Cervical rotation: 30 ° L; 60 ° R  Lateral cervical flexion: 40 ° B with left sided pain on overpressure to L and left sided tightness with overpressure to R  Shoulder mobility is full  Thoracic rotation is mildly restricted   02/01/17 UPDATE:   Cervical rotation: 60 ° total; stiff from 40 to 60 ° L  Lateral cervical flexion: 40 ° B, pain free to R; stiff L   Strength:     Deep cervical flexion: 4/5   Shoulder elevation: 5/5 B  Shoulder ER: 5/5 B  Rhomboid: 4/5 L; 5/5 R  Prone scaption: 4/5 L; 5/5 R   Deep cervical flexion: 5/5   Rhomboids: 4+/5 L  Prone scaption; 4+/5 L   Special Tests:          Spurling's (-)  Neurological Screen:        Myotomes:  WNL throughout upper extremity        Reflexes:  Symmetric to Ue        Neural Tension Tests:  full  Functional Mobility: independent  Balance: WNL   Body Structures Involved:  1. Bones  2. Joints  3. Muscles Body Functions Affected:  1. Neuromusculoskeletal  2. Movement Related Activities and Participation Affected:  1. General Tasks and Demands  2. Mobility  3. Domestic Life  4. Community, Social and Civic Life   CLINICAL PRESENTATION:   CLINICAL DECISION MAKING:   Outcome Measure: Tool Used: Modified Oswestry Disability Index  Score:  Initial: 5/50  Most Recent: X/50 (Date: -- )   Interpretation of Score: Each section is scored on a 0-5 scale, 5 representing the greatest disability. The scores of each section are added together for a total score of 50. Score 0 1-10 11-20 21-30 31-40 41-49 50   Modifier CH CI CJ CK CL CM CN       Medical Necessity:   · Patient is expected to demonstrate progress in strength, range of motion, balance and coordination to increase independence with activities that involve movement of the head and to relieve pain. Reason for Services/Other Comments:  · Patient has demonstrated an improvement in functional level by independent performance of HEP. TREATMENT:   (In addition to Assessment/Re-Assessment sessions the following treatments were rendered)  THERAPEUTIC EXERCISE: (see flow sheet below for specific duration minutes):  Exercises per grid below to improve mobility, strength, balance and coordination. Required minimal visual, verbal and manual cues to promote proper body alignment, promote proper body posture and promote proper body mechanics. Progressed resistance, range, repetitions and complexity of movement as indicated. MANUAL THERAPY: (see flow sheet below for specific duration minutes): Joint mobilization, Soft tissue mobilization and Manipulation was utilized and necessary because of the patient's restricted joint motion, painful spasm, loss of articular motion and restricted motion of soft tissue.    MODALITIES: (see flow sheet below for specific duration minutes):      to relieve pain     Date: 01/25/17 (visit 8) 01/30/17 (visit 9) 02/01/17 (visit 10) 02/06/17 (visit 11) 02/08/17 (visit 12) 02/14/17 (visit 13) 02/17/17 (visit 14) 02/22/17 (visit 15) 02/24/17 (visit 16)    Modalities:                                                    Therapeutic Exercise: 5 min  5 min          Cervical retraction             Upper trap stretch             Sidelying ER             Y, T             Foam rolling              tband resistance into cervical extension [id number]  Self SNAG into cervical rotation to the L [id number]          Proprioceptive Activities:                                                    Manual Therapy: 40 min 45 min 40 min 45 min 45 min 45 min 45 min 45 min 45 min    Soft tissue mobilization repeat To cervical and thoracic paraspinals with trp release to L at approximately T4-5 Repeat, with trip release to L at approximately T4-5 Repeat, with trp release to L at approximately t4-5 repeat STM with trigger point release and aggressive deep tissue to the thoracic spine  Repeat; STM to cervical spine with manual stretching into R lateral cervical flexoin Repeat, TRP release to thoracic paraspinals, PA mobilizations to lateral thoracic spine grade 3 to 4++ L T1 to 6 Repeat previous session    Passive accessory mobilizations repeat Grade 4 to 4++ PA to transverse L T4; C3,4,5 3x30 per segment repeat repeat repeat repeat Grade 4 to 4+ lateral glide to cervical spine throughout. Repeat, manual retraction with lateral flexion to L 10x repeat    Functional Activities:                                                                 HEP: see flow sheet above for specific duration  Treatment/Session Assessment:   Significant reduction in tone is noted through the thoracic paraspinals and cervical spine. · Pain/ Symptoms: Initial: 0/10; \"I'm feeling a lot better. It still seems some things will set it off, but overall it's doing much better. \" Post Session:  0/10 ·   Compliance with Program/Exercises: Will assess as treatment progresses. · Recommendations/Intent for next treatment session: \"Next visit will focus on advancements to more challenging activities\".   Total Treatment Duration:  PT Patient Time In/Time Out  Time In: 0800  Time Out: [facility]

## 2017-02-28 ENCOUNTER — HOSPITAL ENCOUNTER (OUTPATIENT)
Dept: PHYSICAL THERAPY | Age: 67
Discharge: HOME | End: 2017-02-28
Payer: COMMERCIAL

## 2017-02-28 PROCEDURE — 97140 MANUAL THERAPY 1/> REGIONS: CPT

## 2017-03-01 NOTE — PROGRESS NOTES
Rogerio Peña  : 1950 [id number] [facility],[address]  [address] [place] [address].  [phone]   [phone]        OUTPATIENT PHYSICAL THERAPY:Daily Note 3/1/2017 (treatment performed on 17. Documented  due to downtime. ICD-10: Treatment Diagnosis: neck pain  Precautions/Allergies:   Review of patient's allergies indicates not on file. Fall Risk Score: 1 (? 5 = High Risk)  MD Orders: evaluate and treat MEDICAL/REFERRING DIAGNOSIS:  Neck pain [M54.2]  Acute back pain [M54.9]   DATE OF ONSET: 4 week history  REFERRING PHYSICIAN: Fede Bennett [id number]: to be determined     INITIAL ASSESSMENT:  Mr. Yobani Fields presents to therapy with left sided neck pain that is classified as muscular spasm of the upper trap musculature. He has increased tone and tenderness of the upper trap and levator musculature due to weakness and muscle imbalance of the periscapular musculature. He has no signs of neural involvement or shoulder joint involvement as he has good neural mobility and shoulder mobility. He does have restricted cervical rotation to the left due to protective guarding of the musculature. Skilled therapy is required to return to prior level of function. PROBLEM LIST (Impacting functional limitations):  1. Decreased Strength  2. Decreased ADL/Functional Activities  3. Increased Pain  4. Decreased Activity Tolerance  5. Decreased Flexibility/Joint Mobility INTERVENTIONS PLANNED:  1. Cold  2. Electrical Stimulation  3. Heat  4. Home Exercise Program (HEP)  5. Manual Therapy  6. Neuromuscular Re-education/Strengthening  7. Range of Motion (ROM)  8. Therapeutic Activites  9. Therapeutic Exercise/Strengthening   TREATMENT PLAN:  Effective Dates: 17 TO 17.   Frequency/Duration: 2 times a week for 8 weeks  GOALS: (Goals have been discussed and agreed upon with patient.)  Short-Term Functional Goals: Time Frame: 2 weeks  1. Patient will be independent with HEP. MET  2. Patient will report pain <2/10 with daily activity. MET   3. Patient will improve cervical rotation to 40 ° to improve capacity for looking over his shoulder while driving. MET  Discharge Goals: Time Frame: 8 weeks  1. Patient will have no pain with activities such as reading >1 hour. PROGRESSING  2. Patient will improve lower trap strength to 5/5 to normalize muscle stability throughout the scapula and decrease muscular tone. PROGRESSING  3. Patient will improve cervical rotation to 60 ° with no pain at end range. PROGRESSING  Rehabilitation Potential For Stated Goals: Excellent  Regarding Kaley Cobb therapy, I certify that the treatment plan above will be carried out by a therapist or under their direction. Thank you for this referral,  Mark Degroot DPT                   HISTORY:   History of Present Injury/Illness (Reason for Referral):  Patient presents to therapy with primary complaint of left sided neck pain. Symptoms have been present over the past few weeks and he attributes their onset to an increase in activities with a forward neck posture such as working on his iPad and reading. He does note increased pain with activities such as reading a heavy book. He describes his symptoms as a soreness and tightness through the neck and upper back. This has caused diffculty with sleeping. He has been able to manage his symptoms with self massage, stretching and rolling on a ball, however symptoms are not continuing to resolve. He also notes difficulty with activities such as looking over his left shoulder. 01/05/17 patient report: It's actually feeling much better. I get some popping when I'm stretching my neck, but it's nice to know that I'm not harming anything. The shoulder exercises are actually pretty hard. My neck does seem like it's able to rotate a lot better.   Past Medical History/Comorbidities:   Mr. Nellie Hatfield  has a past medical history of Acute sinusitis; Bilateral impacted cerumen; Cerumen impaction; Chronic sinusitis; Conductive hearing loss; Esophageal reflux; Hearing loss; Hearing loss due to cerumen impaction; and Itching of ear. Mr. Emily Flowers  has a past surgical history that includes back surgery and other surgical.  Social History/Living Environment:     Patient lives at home with his wife. Prior Level of Function/Work/Activity:  Patient is a . Dominant Side:         RIGHT  Current Medications:    Current Outpatient Prescriptions:     aspirin delayed-release 81 mg tablet, Take 81 mg by mouth., Disp: , Rfl:     cetirizine (ZYRTEC) 10 mg tablet, 10 mg., Disp: , Rfl:     tadalafil (CIALIS) 5 mg tablet, 5 mg., Disp: , Rfl:     ciclopirox (LOPROX) 0.77 % susp, APPLY TO THE AFFECTED TOENAILS TWICE DAILY, Disp: , Rfl: 11   Date Last Reviewed:  2/28/2017   EXAMINATION:   Observation/Orthostatic Postural Assessment:          Mild increase in thoracic kyphosis with forward head posture. Left scapula is mildly elevated. Palpation:          Increased tone is present through left levator and scalenes with tender points noted throughout rhomboids and infraspinatus.   ROM:     Cervical extension: 60 °   Cervical flexion: 70 °  Cervical rotation: 30 ° L; 60 ° R  Lateral cervical flexion: 40 ° B with left sided pain on overpressure to L and left sided tightness with overpressure to R  Shoulder mobility is full  Thoracic rotation is mildly restricted   02/01/17 UPDATE:   Cervical rotation: 60 ° total; stiff from 40 to 60 ° L  Lateral cervical flexion: 40 ° B, pain free to R; stiff L   Strength:     Deep cervical flexion: 4/5   Shoulder elevation: 5/5 B  Shoulder ER: 5/5 B  Rhomboid: 4/5 L; 5/5 R  Prone scaption: 4/5 L; 5/5 R   Deep cervical flexion: 5/5   Rhomboids: 4+/5 L  Prone scaption; 4+/5 L   Special Tests:          Spurling's (-)  Neurological Screen:        Myotomes:  WNL throughout upper extremity        Reflexes:  Symmetric to Ue        Neural Tension Tests:  full  Functional Mobility:         independent  Balance: WNL   Body Structures Involved:  1. Bones  2. Joints  3. Muscles Body Functions Affected:  1. Neuromusculoskeletal  2. Movement Related Activities and Participation Affected:  1. General Tasks and Demands  2. Mobility  3. Domestic Life  4. Community, Social and Civic Life   CLINICAL PRESENTATION:   CLINICAL DECISION MAKING:   Outcome Measure: Tool Used: Modified Oswestry Disability Index  Score:  Initial: 5/50  Most Recent: X/50 (Date: -- )   Interpretation of Score: Each section is scored on a 0-5 scale, 5 representing the greatest disability. The scores of each section are added together for a total score of 50. Score 0 1-10 11-20 21-30 31-40 41-49 50   Modifier CH CI CJ CK CL CM CN       Medical Necessity:   · Patient is expected to demonstrate progress in strength, range of motion, balance and coordination to increase independence with activities that involve movement of the head and to relieve pain. Reason for Services/Other Comments:  · Patient has demonstrated an improvement in functional level by independent performance of HEP. TREATMENT:   (In addition to Assessment/Re-Assessment sessions the following treatments were rendered)  THERAPEUTIC EXERCISE: (see flow sheet below for specific duration minutes):  Exercises per grid below to improve mobility, strength, balance and coordination. Required minimal visual, verbal and manual cues to promote proper body alignment, promote proper body posture and promote proper body mechanics. Progressed resistance, range, repetitions and complexity of movement as indicated. MANUAL THERAPY: (see flow sheet below for specific duration minutes): Joint mobilization, Soft tissue mobilization and Manipulation was utilized and necessary because of the patient's restricted joint motion, painful spasm, loss of articular motion and restricted motion of soft tissue. MODALITIES: (see flow sheet below for specific duration minutes):      to relieve pain     Date: 01/25/17 (visit 8) 01/30/17 (visit 9) 02/01/17 (visit 10) 02/06/17 (visit 11) 02/08/17 (visit 12) 02/14/17 (visit 13) 02/17/17 (visit 14) 02/22/17 (visit 15) 02/24/17 (visit 16) 02/28/17 (visit 17)   Modalities:                                                    Therapeutic Exercise: 5 min  5 min          Cervical retraction             Upper trap stretch             Sidelying ER             Y, T             Foam rolling              tband resistance into cervical extension [id number]  Self SNAG into cervical rotation to the L [id number]          Proprioceptive Activities:                                                    Manual Therapy: 40 min 45 min 40 min 45 min 45 min 45 min 45 min 45 min 45 min 45 min   Soft tissue mobilization repeat To cervical and thoracic paraspinals with trp release to L at approximately T4-5 Repeat, with trip release to L at approximately T4-5 Repeat, with trp release to L at approximately t4-5 repeat STM with trigger point release and aggressive deep tissue to the thoracic spine  Repeat; STM to cervical spine with manual stretching into R lateral cervical flexoin Repeat, TRP release to thoracic paraspinals, PA mobilizations to lateral thoracic spine grade 3 to 4++ L T1 to 6 Repeat previous session repeat   Passive accessory mobilizations repeat Grade 4 to 4++ PA to transverse L T4; C3,4,5 3x30 per segment repeat repeat repeat repeat Grade 4 to 4+ lateral glide to cervical spine throughout. Repeat, manual retraction with lateral flexion to L 10x repeat repeat   Functional Activities:                                                                 HEP: see flow sheet above for specific duration  Treatment/Session Assessment:   Patient has restored full, pain free cervical rotation.     · Pain/ Symptoms: Initial: 0/10; \"It can still get a bit irritated with certain things but it's feeling much better. \" Post Session:  0/10 ·   Compliance with Program/Exercises: Will assess as treatment progresses. · Recommendations/Intent for next treatment session: \"Next visit will focus on advancements to more challenging activities\".   Total Treatment Duration:  PT Patient Time In/Time Out  Time In: 1440  Time Out: FIORDALIZA Lara

## 2017-04-17 NOTE — PROGRESS NOTES
Ngoc Gomez   [phone] [facility],[address]  [facility] [address]  [phone]   [phone]           PHYSICIAN COMMUNICATION and discharge    REFERRING PHYSICIAN: Justina Briones*  Return Physician Appointment: to be determined  MEDICAL/REFERRING DIAGNOSIS:  · Neck pain [M54.2]  · Acute back pain [M54.9]  ATTENDANCE: Ngoc Gomez has attended 16 out of 16 visits, with 0 cancellation(s) and 0 no shows. ASSESSMENT:  DATE: 2017    PROGRESS: Ngoc Gomez progressed very well with therapy. Treatment consisted of extensive manual therapy to improve soft tissue mobility, muscular tone and joint mobility. He restored cervical range of motion to full with no pain at end range. He was also instructed in an extensive home program to improve strength of the cervical paraspinals and postural musculature. RECOMMENDATIONS: Patient will be discharged back to your care PRN.      Thank you for this referral,  Kunal Strange DPT

## 2017-06-12 ENCOUNTER — HOSPITAL ENCOUNTER (OUTPATIENT)
Dept: PHYSICAL THERAPY | Age: 67
Discharge: HOME | End: 2017-06-12
Payer: COMMERCIAL

## 2017-06-12 PROCEDURE — 97162 PT EVAL MOD COMPLEX 30 MIN: CPT

## 2017-06-12 PROCEDURE — 97140 MANUAL THERAPY 1/> REGIONS: CPT

## 2017-06-12 NOTE — PROGRESS NOTES
Mely Byers  : 1950 [address] [address].  [phone]   [phone]        OUTPATIENT PHYSICAL THERAPY:Initial Assessment 2017        ICD-10: Treatment Diagnosis: Cervicalgia (M54.2)  Precautions/Allergies:   Review of patient's allergies indicates no known allergies. Fall Risk Score: 1 (? 5 = High Risk)  MD Orders: evaluate and treat MEDICAL/REFERRING DIAGNOSIS:  Cervicalgia [M54.2]   DATE OF ONSET: 17  REFERRING PHYSICIAN: Fede Estevez 2902: to be determined     INITIAL ASSESSMENT:  Mr. Brook Boland presents to therapy with recurrence of chronic neck pain secondary to cervical spondylosis that is classified as responding to gentle mobilization categorization. He has restricted mobility in the cervical spine secondary to stiffness of the mid and lower left cervical joints and increased tone and stiffness of the cervical paraspinals and levator musculature. Further, trigger points are present through the thoracic paraspinals and cervical paraspinals further contributing to increased pain. He has weakness of the lower trap musculature, deep flexor musculature and cervical extensors. This causes increased pain and limited capacity for prolonged work tasks or reading. He has no signs of radicular symptoms or diminished motor nerve function. Skilled therapy is required to return to prior level of function. PROBLEM LIST (Impacting functional limitations):  1. Decreased Strength  2. Decreased ADL/Functional Activities  3. Increased Pain  4. Decreased Activity Tolerance  5. Decreased Flexibility/Joint Mobility INTERVENTIONS PLANNED:  1. Cryotherapy  2. Electrical Stimulation  3. Heat  4. Manual Therapy  5. Neuromuscular Re-education/Strengthening  6. Range of Motion (ROM)  7. Therapeutic Activites  8.  Therapeutic Exercise/Strengthening  9. modalities, including cervical traction TREATMENT PLAN:  Effective Dates: 06/12/17 TO 09/12/17. Frequency/Duration: 3 times a week for 12 weeks  GOALS: (Goals have been discussed and agreed upon with patient.)  Short-Term Functional Goals: Time Frame: 2 weeks  1. Patient will be independent with HEP. 2. Patient will decrease pain to no greater than 5/10 to allow him to sleep through the night. 3. Patient will improve lateral cervical flexion to 30 ° to improve cervical mobility and flexibility of the levator musculature. Discharge Goals: Time Frame: 12 weeks  1. Patient will be able to read or do computer work for >1 hour without pain to return to prior level of function. 2. Patient will report pain <2/10 with all daily activity. 3. Patient will improve cervical rotation to >70 ° and lateral cervical flexion to >50 ° to normalize cervical mobility and decrease joint irritability. Rehabilitation Potential For Stated Goals: Excellent  Regarding Kaycee Stai therapy, I certify that the treatment plan above will be carried out by a therapist or under their direction. Thank you for this referral,  Sarahi Ortega DPT       Referring Physician Signature: Neo Soares*              Date                      HISTORY:   History of Present Injury/Illness (Reason for Referral):  Patient presents to therapy with primary complaint of bilateral neck pain, left greater than right. Symptoms began on above date following an extended afternoon/evening of computer work and reading. This initially caused significant difficulty with sleeping that night and he has continued to have difficulty sleeping. Symptoms are described as a severe stiffness and aching pain through the left lateral neck. This increases with all movements into rotation or lateral flexion. He notes severity of symptoms up to 9/10 over the past few days and has had difficulty with sleeping greater than 3 hours and is unable to sleep laying in bed.  He denies any loss of strength through the arms or shoulders. Past Medical History/Comorbidities:   Mr. Be Bui  has a past medical history of Acute sinusitis; Bilateral impacted cerumen; Cerumen impaction; Chronic sinusitis; Conductive hearing loss; Esophageal reflux; Hearing loss; Hearing loss due to cerumen impaction; and Itching of ear. Mr. Be Bui  has a past surgical history that includes back surgery and other surgical.  Social History/Living Environment:     Patient lives at home with his wife. Prior Level of Function/Work/Activity:  Patient is a  and enjoys running and working out on a regular basis. Dominant Side:         RIGHT  Current Medications:    Current Outpatient Prescriptions:     aspirin delayed-release 81 mg tablet, Take 81 mg by mouth., Disp: , Rfl:     cetirizine (ZYRTEC) 10 mg tablet, 10 mg., Disp: , Rfl:     tadalafil (CIALIS) 5 mg tablet, 5 mg., Disp: , Rfl:     ciclopirox (LOPROX) 0.77 % susp, APPLY TO THE AFFECTED TOENAILS TWICE DAILY, Disp: , Rfl: 11   Date Last Reviewed:  2017   # of Personal Factors/Comorbidities that affect the Plan of Care: 1-2: MODERATE COMPLEXITY   EXAMINATION:   Observation/Orthostatic Postural Assessment:          Mild forward head posture. Palpation:          Trigger points present through right and left thoracic paraspinals. Stiffness noted with PA mobilizations of thoracic spine. Provocation of symptoms noted with palpation of C5-6 transverse processes and levator musculature. ROM:     Cervical extension: limited 25%  Cervical flexion: WNL  Lateral cervical flexion: 25 ° to R; 30 ° to L both with provocation of left sided symptoms  Cervical rotation: 50 ° to R; 45 ° to L both with provocation of left sided symptoms  Shoulder mobility is full      Strength:     Myotomal strength is full  Deep cervical flexion: 3/5  Cervical extension: 3/5   Shoulder shru/5 B  Shoulder scaption: 5/5 B      Special Tests:          Spurling's (-); Distraction (+);  Compression (-); ULTT (-); Concordant sign noted with PA and lateral glide mobilization to C4-5 and C5-6; Stiffness noted with mobilization of L first rib. Neurological Screen:        Reflexes and sensation are fully intact. Functional Mobility:         WNL  Balance: WNL   Body Structures Involved:  1. Nerves  2. Bones  3. Joints  4. Muscles  5. Ligaments Body Functions Affected:  1. Sensory/Pain  2. Neuromusculoskeletal  3. Movement Related Activities and Participation Affected:  1. General Tasks and Demands  2. Communication  3. Self Care  4. Domestic Life  5. Interpersonal Interactions and Relationships  6. Community, Social and [place]   # of elements that affect the Plan of Care: 3: MODERATE COMPLEXITY   CLINICAL PRESENTATION:   Presentation: Evolving clinical presentation with changing clinical characteristics: MODERATE COMPLEXITY   CLINICAL DECISION MAKING:   Outcome Measure: Tool Used: VAS  Score:  Initial: 8/10 Most Recent: X (Date: -- )   Interpretation of Score: 80% impaired with activities due to increased pain  Outcome Measure Conversion Site      Medical Necessity:   · Patient is expected to demonstrate progress in strength, range of motion, balance and coordination to increase independence with activities that involve movement of the neck and to decrease pain. Reason for Services/Other Comments:  · Patient has demonstrated an improvement in functional level by independent performance of HEP. Use of outcome tool(s) and clinical judgement create a POC that gives a: Questionable prediction of patient's progress: MODERATE COMPLEXITY   TREATMENT:   (In addition to Assessment/Re-Assessment sessions the following treatments were rendered)  THERAPEUTIC EXERCISE: (see flow sheet below for minutes):  Exercises per grid below to improve mobility, strength, balance and coordination.   Required minimal verbal and manual cues to promote proper body alignment, promote proper body posture and promote proper body mechanics. Progressed resistance, range, repetitions and complexity of movement as indicated. MANUAL THERAPY: (see flow sheet below for minutes): Joint mobilization and Soft tissue mobilization was utilized and necessary because of the patient's restricted joint motion, painful spasm, loss of articular motion and restricted motion of soft tissue. MODALITIES: (see flow sheet below for minutes):      to decrease pain     Date: 06/12/17       Modalities:        Cervical traction Sent home with cervical traction unit. Instructed to perform to 17# for 15 min                       Therapeutic Exercise:        Stretching Supine manual stretching of levator and upper trap musculature [id number] to R                                               Proprioceptive Activities:                                Manual Therapy: 15 min       STM In prone: trigger point release through cervical and thoracic musculature, STM to levator, cervical and shoulder musculature B       Passive physiologic mobilizations        Passive accessory mobilizations In supine: lateral cervical glides L to R and R to L gr 2-3 to C4 thru 6; In prone PA mobilization gr 2-3 to C5 and 6 transverse processes       Cervical traction Manual intermittent performed               Therapeutic Activities:                                        HEP: See 06/12/17 flow sheet for specifics  Treatment/Session Assessment:  Patient is independent with light stretching regimen. Cervical mobility is unchanged following manual therapy, however intensity of pain decreases to 5/10. · Pain/ Symptoms: Initial:   8/10 Post Session:  5/10 ·   Compliance with Program/Exercises: Will assess as treatment progresses. · Recommendations/Intent for next treatment session: \"Next visit will focus on advancements to more challenging activities\".   Total Treatment Duration:  PT Patient Time In/Time Out  Time In: 0292  Time Out: FIORDALIZA Lara

## 2017-06-12 NOTE — PROGRESS NOTES
Ambulatory/Rehab Services H2 Model Falls Risk Assessment    Risk Factor Pts. ·   Confusion/Disorientation/Impulsivity  []    4 ·   Symptomatic Depression  []   2 ·   Altered Elimination  []   1 ·   Dizziness/Vertigo  []   1 ·   Gender (Male)  [x]   1 ·   Any administered antiepileptics (anticonvulsants):  []   2 ·   Any administered benzodiazepines:  []   1 ·   Visual Impairment (specify):  []   1 ·   Portable Oxygen Use  []   1 ·   Orthostatic ? BP  []   1 ·   History of Recent Falls (within 3 mos.)  []   5     Ability to Rise from Chair (choose one) Pts. ·   Ability to rise in a single movement  [x]   0 ·   Pushes up, successful in one attempt  []   1 ·   Multiple attempts, but successful  []   3 ·   Unable to rise without assistance  []   4   Total: (5 or greater = High Risk) 1     Falls Prevention Plan:   []                Physical Limitations to Exercise (specify):   []                Mobility Assistance Device (type):   []                Exercise/Equipment Adaptation (specify):    ©2010 [facility] [address] Patent #[id number].  Federal Law prohibits the replication, distribution or use without written permission from [facility] [employer]

## 2017-06-14 ENCOUNTER — HOSPITAL ENCOUNTER (OUTPATIENT)
Dept: PHYSICAL THERAPY | Age: 67
Discharge: HOME | End: 2017-06-14
Payer: COMMERCIAL

## 2017-06-14 PROCEDURE — 97140 MANUAL THERAPY 1/> REGIONS: CPT

## 2017-06-14 NOTE — PROGRESS NOTES
Daryl Alberts  : 1950 [address] [facility]  [address] [place] [address].  [phone]   [phone]        OUTPATIENT PHYSICAL THERAPY:Daily Note 2017        ICD-10: Treatment Diagnosis: Cervicalgia (M54.2)  Precautions/Allergies:   Review of patient's allergies indicates no known allergies. Fall Risk Score: 1 (? 5 = High Risk)  MD Orders: evaluate and treat MEDICAL/REFERRING DIAGNOSIS:  Cervicalgia [M54.2]   DATE OF ONSET: 17  REFERRING PHYSICIAN: Fede Hernandez 2907: to be determined     INITIAL ASSESSMENT:  Mr. Gabbie Garcia presents to therapy with recurrence of chronic neck pain secondary to cervical spondylosis that is classified as responding to gentle mobilization categorization. He has restricted mobility in the cervical spine secondary to stiffness of the mid and lower left cervical joints and increased tone and stiffness of the cervical paraspinals and levator musculature. Further, trigger points are present through the thoracic paraspinals and cervical paraspinals further contributing to increased pain. He has weakness of the lower trap musculature, deep flexor musculature and cervical extensors. This causes increased pain and limited capacity for prolonged work tasks or reading. He has no signs of radicular symptoms or diminished motor nerve function. Skilled therapy is required to return to prior level of function. PROBLEM LIST (Impacting functional limitations):  1. Decreased Strength  2. Decreased ADL/Functional Activities  3. Increased Pain  4. Decreased Activity Tolerance  5. Decreased Flexibility/Joint Mobility INTERVENTIONS PLANNED:  1. Cryotherapy  2. Electrical Stimulation  3. Heat  4. Manual Therapy  5. Neuromuscular Re-education/Strengthening  6. Range of Motion (ROM)  7. Therapeutic Activites  8.  Therapeutic Exercise/Strengthening  9. modalities, including cervical traction   TREATMENT PLAN:  Effective Dates: 06/12/17 TO 09/12/17. Frequency/Duration: 3 times a week for 12 weeks  GOALS: (Goals have been discussed and agreed upon with patient.)  Short-Term Functional Goals: Time Frame: 2 weeks  1. Patient will be independent with HEP. 2. Patient will decrease pain to no greater than 5/10 to allow him to sleep through the night. 3. Patient will improve lateral cervical flexion to 30 ° to improve cervical mobility and flexibility of the levator musculature. Discharge Goals: Time Frame: 12 weeks  1. Patient will be able to read or do computer work for >1 hour without pain to return to prior level of function. 2. Patient will report pain <2/10 with all daily activity. 3. Patient will improve cervical rotation to >70 ° and lateral cervical flexion to >50 ° to normalize cervical mobility and decrease joint irritability. Rehabilitation Potential For Stated Goals: Excellent                HISTORY:   History of Present Injury/Illness (Reason for Referral):  Patient presents to therapy with primary complaint of bilateral neck pain, left greater than right. Symptoms began on above date following an extended afternoon/evening of computer work and reading. This initially caused significant difficulty with sleeping that night and he has continued to have difficulty sleeping. Symptoms are described as a severe stiffness and aching pain through the left lateral neck. This increases with all movements into rotation or lateral flexion. He notes severity of symptoms up to 9/10 over the past few days and has had difficulty with sleeping greater than 3 hours and is unable to sleep laying in bed. He denies any loss of strength through the arms or shoulders. Past Medical History/Comorbidities:   Mr. Lila Florence  has a past medical history of Acute sinusitis; Bilateral impacted cerumen; Cerumen impaction; Chronic sinusitis; Conductive hearing loss; Esophageal reflux; Hearing loss;  Hearing loss due to cerumen impaction; and Itching of ear. Mr. María Lee  has a past surgical history that includes back surgery and other surgical.  Social History/Living Environment:     Patient lives at home with his wife. Prior Level of Function/Work/Activity:  Patient is a  and enjoys running and working out on a regular basis. Dominant Side:         RIGHT  Current Medications:    Current Outpatient Prescriptions:     aspirin delayed-release 81 mg tablet, Take 81 mg by mouth., Disp: , Rfl:     cetirizine (ZYRTEC) 10 mg tablet, 10 mg., Disp: , Rfl:     tadalafil (CIALIS) 5 mg tablet, 5 mg., Disp: , Rfl:     ciclopirox (LOPROX) 0.77 % susp, APPLY TO THE AFFECTED TOENAILS TWICE DAILY, Disp: , Rfl: 11   Date Last Reviewed:  2017   EXAMINATION:   Observation/Orthostatic Postural Assessment:          Mild forward head posture. Palpation:          Trigger points present through right and left thoracic paraspinals. Stiffness noted with PA mobilizations of thoracic spine. Provocation of symptoms noted with palpation of C5-6 transverse processes and levator musculature. ROM:     Cervical extension: limited 25%  Cervical flexion: WNL  Lateral cervical flexion: 25 ° to R; 30 ° to L both with provocation of left sided symptoms  Cervical rotation: 50 ° to R; 45 ° to L both with provocation of left sided symptoms  Shoulder mobility is full      Strength:     Myotomal strength is full  Deep cervical flexion: 3/5  Cervical extension: 3/5   Shoulder shru/5 B  Shoulder scaption: 5/5 B      Special Tests:          Spurling's (-); Distraction (+); Compression (-); ULTT (-); Concordant sign noted with PA and lateral glide mobilization to C4-5 and C5-6; Stiffness noted with mobilization of L first rib. Neurological Screen:        Reflexes and sensation are fully intact. Functional Mobility:         WNL  Balance: WNL   Body Structures Involved:  1. Nerves  2. Bones  3. Joints  4. Muscles  5.  Ligaments Body Functions Affected:  1. Sensory/Pain  2. Neuromusculoskeletal  3. Movement Related Activities and Participation Affected:  1. General Tasks and Demands  2. Communication  3. Self Care  4. Domestic Life  5. Interpersonal Interactions and Relationships  6. Community, Social and Civic Life   CLINICAL PRESENTATION:   CLINICAL DECISION MAKING:   Outcome Measure: Tool Used: VAS  Score:  Initial: 8/10 Most Recent: X (Date: -- )   Interpretation of Score: 80% impaired with activities due to increased pain  Outcome Measure Conversion Site      Medical Necessity:   · Patient is expected to demonstrate progress in strength, range of motion, balance and coordination to increase independence with activities that involve movement of the neck and to decrease pain. Reason for Services/Other Comments:  · Patient has demonstrated an improvement in functional level by independent performance of HEP. TREATMENT:   (In addition to Assessment/Re-Assessment sessions the following treatments were rendered)  THERAPEUTIC EXERCISE: (see flow sheet below for minutes):  Exercises per grid below to improve mobility, strength, balance and coordination. Required minimal verbal and manual cues to promote proper body alignment, promote proper body posture and promote proper body mechanics. Progressed resistance, range, repetitions and complexity of movement as indicated. MANUAL THERAPY: (see flow sheet below for minutes): Joint mobilization and Soft tissue mobilization was utilized and necessary because of the patient's restricted joint motion, painful spasm, loss of articular motion and restricted motion of soft tissue. MODALITIES: (see flow sheet below for minutes):      to decrease pain     Date: 06/12/17 06/14/17 (visit 2)      Modalities:        Cervical traction Sent home with cervical traction unit.  Instructed to perform to 17# for 15 min                       Therapeutic Exercise:        Stretching Supine manual stretching of levator and upper trap musculature [id number] to R                                               Proprioceptive Activities:                                Manual Therapy: 15 min 45 min      STM In prone: trigger point release through cervical and thoracic musculature, STM to levator, cervical and shoulder musculature B repeat      Passive physiologic mobilizations  Manual stretching into R lateral cervical flexion grade 2 to 3- [id number]      Passive accessory mobilizations In supine: lateral cervical glides L to R and R to L gr 2-3 to C4 thru 6; In prone PA mobilization gr 2-3 to C5 and 6 transverse processes repeat      Cervical traction Manual intermittent performed Manual, grade 3 to 3++               Therapeutic Activities:                                        HEP: See 06/12/17 flow sheet for specifics  Treatment/Session Assessment:  Right rotation is significantly improved. Left cervical rotation remains comparable sign. Crepitus continues to be present with active movement into each direction. · Pain/ Symptoms: Initial:   7/10 \"I'm still having a lot of trouble with sleeping. \" Post Session:  5/10 ·   Compliance with Program/Exercises: Will assess as treatment progresses. · Recommendations/Intent for next treatment session: \"Next visit will focus on advancements to more challenging activities\".   Total Treatment Duration:  PT Patient Time In/Time Out  Time In: 1530  Time Out: [address], DPT

## 2017-06-16 ENCOUNTER — HOSPITAL ENCOUNTER (OUTPATIENT)
Dept: PHYSICAL THERAPY | Age: 67
Discharge: HOME | End: 2017-06-16
Payer: COMMERCIAL

## 2017-06-16 PROCEDURE — 97140 MANUAL THERAPY 1/> REGIONS: CPT

## 2017-06-16 NOTE — PROGRESS NOTES
Rigo Bernal  : 1950 [address] [address].  [phone]   [phone]        OUTPATIENT PHYSICAL THERAPY:Daily Note 2017        ICD-10: Treatment Diagnosis: Cervicalgia (M54.2)  Precautions/Allergies:   Review of patient's allergies indicates no known allergies. Fall Risk Score: 1 (? 5 = High Risk)  MD Orders: evaluate and treat MEDICAL/REFERRING DIAGNOSIS:  Cervicalgia [M54.2]   DATE OF ONSET: 17  REFERRING PHYSICIAN: Fede Mike 2905: to be determined     INITIAL ASSESSMENT:  Mr. Sola Tate presents to therapy with recurrence of chronic neck pain secondary to cervical spondylosis that is classified as responding to gentle mobilization categorization. He has restricted mobility in the cervical spine secondary to stiffness of the mid and lower left cervical joints and increased tone and stiffness of the cervical paraspinals and levator musculature. Further, trigger points are present through the thoracic paraspinals and cervical paraspinals further contributing to increased pain. He has weakness of the lower trap musculature, deep flexor musculature and cervical extensors. This causes increased pain and limited capacity for prolonged work tasks or reading. He has no signs of radicular symptoms or diminished motor nerve function. Skilled therapy is required to return to prior level of function. PROBLEM LIST (Impacting functional limitations):  1. Decreased Strength  2. Decreased ADL/Functional Activities  3. Increased Pain  4. Decreased Activity Tolerance  5. Decreased Flexibility/Joint Mobility INTERVENTIONS PLANNED:  1. Cryotherapy  2. Electrical Stimulation  3. Heat  4. Manual Therapy  5. Neuromuscular Re-education/Strengthening  6. Range of Motion (ROM)  7. Therapeutic Activites  8.  Therapeutic Exercise/Strengthening  9. modalities, including cervical traction   TREATMENT PLAN:  Effective Dates: 06/12/17 TO 09/12/17. Frequency/Duration: 3 times a week for 12 weeks  GOALS: (Goals have been discussed and agreed upon with patient.)  Short-Term Functional Goals: Time Frame: 2 weeks  1. Patient will be independent with HEP. 2. Patient will decrease pain to no greater than 5/10 to allow him to sleep through the night. 3. Patient will improve lateral cervical flexion to 30 ° to improve cervical mobility and flexibility of the levator musculature. Discharge Goals: Time Frame: 12 weeks  1. Patient will be able to read or do computer work for >1 hour without pain to return to prior level of function. 2. Patient will report pain <2/10 with all daily activity. 3. Patient will improve cervical rotation to >70 ° and lateral cervical flexion to >50 ° to normalize cervical mobility and decrease joint irritability. Rehabilitation Potential For Stated Goals: Excellent                HISTORY:   History of Present Injury/Illness (Reason for Referral):  Patient presents to therapy with primary complaint of bilateral neck pain, left greater than right. Symptoms began on above date following an extended afternoon/evening of computer work and reading. This initially caused significant difficulty with sleeping that night and he has continued to have difficulty sleeping. Symptoms are described as a severe stiffness and aching pain through the left lateral neck. This increases with all movements into rotation or lateral flexion. He notes severity of symptoms up to 9/10 over the past few days and has had difficulty with sleeping greater than 3 hours and is unable to sleep laying in bed. He denies any loss of strength through the arms or shoulders. Past Medical History/Comorbidities:   Mr. Amina Bai  has a past medical history of Acute sinusitis; Bilateral impacted cerumen; Cerumen impaction; Chronic sinusitis; Conductive hearing loss; Esophageal reflux; Hearing loss;  Hearing loss due to cerumen impaction; and Itching of ear. Mr. David Fairbanks  has a past surgical history that includes back surgery and other surgical.  Social History/Living Environment:     Patient lives at home with his wife. Prior Level of Function/Work/Activity:  Patient is a  and enjoys running and working out on a regular basis. Dominant Side:         RIGHT  Current Medications:    Current Outpatient Prescriptions:     aspirin delayed-release 81 mg tablet, Take 81 mg by mouth., Disp: , Rfl:     cetirizine (ZYRTEC) 10 mg tablet, 10 mg., Disp: , Rfl:     tadalafil (CIALIS) 5 mg tablet, 5 mg., Disp: , Rfl:     ciclopirox (LOPROX) 0.77 % susp, APPLY TO THE AFFECTED TOENAILS TWICE DAILY, Disp: , Rfl: 11   Date Last Reviewed:  2017   EXAMINATION:   Observation/Orthostatic Postural Assessment:          Mild forward head posture. Palpation:          Trigger points present through right and left thoracic paraspinals. Stiffness noted with PA mobilizations of thoracic spine. Provocation of symptoms noted with palpation of C5-6 transverse processes and levator musculature. ROM:     Cervical extension: limited 25%  Cervical flexion: WNL  Lateral cervical flexion: 25 ° to R; 30 ° to L both with provocation of left sided symptoms  Cervical rotation: 50 ° to R; 45 ° to L both with provocation of left sided symptoms  Shoulder mobility is full      Strength:     Myotomal strength is full  Deep cervical flexion: 3/5  Cervical extension: 3/5   Shoulder shru/5 B  Shoulder scaption: 5/5 B      Special Tests:          Spurling's (-); Distraction (+); Compression (-); ULTT (-); Concordant sign noted with PA and lateral glide mobilization to C4-5 and C5-6; Stiffness noted with mobilization of L first rib. Neurological Screen:        Reflexes and sensation are fully intact. Functional Mobility:         WNL  Balance: WNL   Body Structures Involved:  1. Nerves  2. Bones  3. Joints  4. Muscles  5.  Ligaments Body Functions Affected:  1. Sensory/Pain  2. Neuromusculoskeletal  3. Movement Related Activities and Participation Affected:  1. General Tasks and Demands  2. Communication  3. Self Care  4. Domestic Life  5. Interpersonal Interactions and Relationships  6. Community, Social and Civic Life   CLINICAL PRESENTATION:   CLINICAL DECISION MAKING:   Outcome Measure: Tool Used: VAS  Score:  Initial: 8/10 Most Recent: X (Date: -- )   Interpretation of Score: 80% impaired with activities due to increased pain  Outcome Measure Conversion Site      Medical Necessity:   · Patient is expected to demonstrate progress in strength, range of motion, balance and coordination to increase independence with activities that involve movement of the neck and to decrease pain. Reason for Services/Other Comments:  · Patient has demonstrated an improvement in functional level by independent performance of HEP. TREATMENT:   (In addition to Assessment/Re-Assessment sessions the following treatments were rendered)  THERAPEUTIC EXERCISE: (see flow sheet below for minutes):  Exercises per grid below to improve mobility, strength, balance and coordination. Required minimal verbal and manual cues to promote proper body alignment, promote proper body posture and promote proper body mechanics. Progressed resistance, range, repetitions and complexity of movement as indicated. MANUAL THERAPY: (see flow sheet below for minutes): Joint mobilization and Soft tissue mobilization was utilized and necessary because of the patient's restricted joint motion, painful spasm, loss of articular motion and restricted motion of soft tissue. MODALITIES: (see flow sheet below for minutes):      to decrease pain     Date: 06/12/17 06/14/17 (visit 2) 06/16/17 (visit 3)     Modalities:        Cervical traction Sent home with cervical traction unit.  Instructed to perform to 17# for 15 min                       Therapeutic Exercise:        Stretching Supine manual stretching of levator and upper trap musculature [id number] to R                                               Proprioceptive Activities:                                Manual Therapy: 15 min 45 min 45 min     STM In prone: trigger point release through cervical and thoracic musculature, STM to levator, cervical and shoulder musculature B repeat repeat     Passive physiologic mobilizations  Manual stretching into R lateral cervical flexion grade 2 to 3- [id number] repeat     Passive accessory mobilizations In supine: lateral cervical glides L to R and R to L gr 2-3 to C4 thru 6; In prone PA mobilization gr 2-3 to C5 and 6 transverse processes repeat repeat     Cervical traction Manual intermittent performed Manual, grade 3 to 3++  repeat             Therapeutic Activities:                                        HEP: See 06/12/17 flow sheet for specifics  Treatment/Session Assessment:  Right and left cervical rotation continues to improve, however he continues to have difficulty with sustained postures or prolonged reading type activities. · Pain/ Symptoms: Initial:   6/10 \"I think the motion is a lot better, but I'm still having trouble sleeping. \" Post Session:  5/10 ·   Compliance with Program/Exercises: Will assess as treatment progresses. · Recommendations/Intent for next treatment session: \"Next visit will focus on advancements to more challenging activities\".   Total Treatment Duration:  PT Patient Time In/Time Out  Time In: 9080  Time Out: [address], T

## 2017-06-20 ENCOUNTER — HOSPITAL ENCOUNTER (OUTPATIENT)
Dept: PHYSICAL THERAPY | Age: 67
Discharge: HOME | End: 2017-06-20
Payer: COMMERCIAL

## 2017-06-20 PROCEDURE — 97140 MANUAL THERAPY 1/> REGIONS: CPT

## 2017-06-20 NOTE — PROGRESS NOTES
Michael Montes  : 1950 [address] [facility]  [address] [place] [address].  [phone]   [phone]        OUTPATIENT PHYSICAL THERAPY:Daily Note 2017        ICD-10: Treatment Diagnosis: Cervicalgia (M54.2)  Precautions/Allergies:   Review of patient's allergies indicates no known allergies. Fall Risk Score: 1 (? 5 = High Risk)  MD Orders: evaluate and treat MEDICAL/REFERRING DIAGNOSIS:  Cervicalgia [M54.2]   DATE OF ONSET: 17  REFERRING PHYSICIAN: Fede Moreno 2906: to be determined     INITIAL ASSESSMENT:  Mr. Silke Sommers presents to therapy with recurrence of chronic neck pain secondary to cervical spondylosis that is classified as responding to gentle mobilization categorization. He has restricted mobility in the cervical spine secondary to stiffness of the mid and lower left cervical joints and increased tone and stiffness of the cervical paraspinals and levator musculature. Further, trigger points are present through the thoracic paraspinals and cervical paraspinals further contributing to increased pain. He has weakness of the lower trap musculature, deep flexor musculature and cervical extensors. This causes increased pain and limited capacity for prolonged work tasks or reading. He has no signs of radicular symptoms or diminished motor nerve function. Skilled therapy is required to return to prior level of function. PROBLEM LIST (Impacting functional limitations):  1. Decreased Strength  2. Decreased ADL/Functional Activities  3. Increased Pain  4. Decreased Activity Tolerance  5. Decreased Flexibility/Joint Mobility INTERVENTIONS PLANNED:  1. Cryotherapy  2. Electrical Stimulation  3. Heat  4. Manual Therapy  5. Neuromuscular Re-education/Strengthening  6. Range of Motion (ROM)  7. Therapeutic Activites  8.  Therapeutic Exercise/Strengthening  9. modalities, including cervical traction   TREATMENT PLAN:  Effective Dates: 06/12/17 TO 09/12/17. Frequency/Duration: 3 times a week for 12 weeks  GOALS: (Goals have been discussed and agreed upon with patient.)  Short-Term Functional Goals: Time Frame: 2 weeks  1. Patient will be independent with HEP. 2. Patient will decrease pain to no greater than 5/10 to allow him to sleep through the night. 3. Patient will improve lateral cervical flexion to 30 ° to improve cervical mobility and flexibility of the levator musculature. Discharge Goals: Time Frame: 12 weeks  1. Patient will be able to read or do computer work for >1 hour without pain to return to prior level of function. 2. Patient will report pain <2/10 with all daily activity. 3. Patient will improve cervical rotation to >70 ° and lateral cervical flexion to >50 ° to normalize cervical mobility and decrease joint irritability. Rehabilitation Potential For Stated Goals: Excellent                HISTORY:   History of Present Injury/Illness (Reason for Referral):  Patient presents to therapy with primary complaint of bilateral neck pain, left greater than right. Symptoms began on above date following an extended afternoon/evening of computer work and reading. This initially caused significant difficulty with sleeping that night and he has continued to have difficulty sleeping. Symptoms are described as a severe stiffness and aching pain through the left lateral neck. This increases with all movements into rotation or lateral flexion. He notes severity of symptoms up to 9/10 over the past few days and has had difficulty with sleeping greater than 3 hours and is unable to sleep laying in bed. He denies any loss of strength through the arms or shoulders. Past Medical History/Comorbidities:   Mr. Dexter Hill  has a past medical history of Acute sinusitis; Bilateral impacted cerumen; Cerumen impaction; Chronic sinusitis; Conductive hearing loss; Esophageal reflux; Hearing loss;  Hearing loss due to cerumen impaction; and Itching of ear. Mr. Erika Vargas  has a past surgical history that includes back surgery and other surgical.  Social History/Living Environment:     Patient lives at home with his wife. Prior Level of Function/Work/Activity:  Patient is a  and enjoys running and working out on a regular basis. Dominant Side:         RIGHT  Current Medications:    Current Outpatient Prescriptions:     aspirin delayed-release 81 mg tablet, Take 81 mg by mouth., Disp: , Rfl:     cetirizine (ZYRTEC) 10 mg tablet, 10 mg., Disp: , Rfl:     tadalafil (CIALIS) 5 mg tablet, 5 mg., Disp: , Rfl:     ciclopirox (LOPROX) 0.77 % susp, APPLY TO THE AFFECTED TOENAILS TWICE DAILY, Disp: , Rfl: 11   Date Last Reviewed:  2017   EXAMINATION:   Observation/Orthostatic Postural Assessment:          Mild forward head posture. Palpation:          Trigger points present through right and left thoracic paraspinals. Stiffness noted with PA mobilizations of thoracic spine. Provocation of symptoms noted with palpation of C5-6 transverse processes and levator musculature. ROM:     Cervical extension: limited 25%  Cervical flexion: WNL  Lateral cervical flexion: 25 ° to R; 30 ° to L both with provocation of left sided symptoms  Cervical rotation: 50 ° to R; 45 ° to L both with provocation of left sided symptoms  Shoulder mobility is full      Strength:     Myotomal strength is full  Deep cervical flexion: 3/5  Cervical extension: 3/5   Shoulder shru/5 B  Shoulder scaption: 5/5 B      Special Tests:          Spurling's (-); Distraction (+); Compression (-); ULTT (-); Concordant sign noted with PA and lateral glide mobilization to C4-5 and C5-6; Stiffness noted with mobilization of L first rib. Neurological Screen:        Reflexes and sensation are fully intact. Functional Mobility:         WNL  Balance: WNL   Body Structures Involved:  1. Nerves  2. Bones  3. Joints  4. Muscles  5.  Ligaments Body Functions Affected:  1. Sensory/Pain  2. Neuromusculoskeletal  3. Movement Related Activities and Participation Affected:  1. General Tasks and Demands  2. Communication  3. Self Care  4. Domestic Life  5. Interpersonal Interactions and Relationships  6. Community, Social and Civic Life   CLINICAL PRESENTATION:   CLINICAL DECISION MAKING:   Outcome Measure: Tool Used: VAS  Score:  Initial: 8/10 Most Recent: X (Date: -- )   Interpretation of Score: 80% impaired with activities due to increased pain  Outcome Measure Conversion Site      Medical Necessity:   · Patient is expected to demonstrate progress in strength, range of motion, balance and coordination to increase independence with activities that involve movement of the neck and to decrease pain. Reason for Services/Other Comments:  · Patient has demonstrated an improvement in functional level by independent performance of HEP. TREATMENT:   (In addition to Assessment/Re-Assessment sessions the following treatments were rendered)  THERAPEUTIC EXERCISE: (see flow sheet below for minutes):  Exercises per grid below to improve mobility, strength, balance and coordination. Required minimal verbal and manual cues to promote proper body alignment, promote proper body posture and promote proper body mechanics. Progressed resistance, range, repetitions and complexity of movement as indicated. MANUAL THERAPY: (see flow sheet below for minutes): Joint mobilization and Soft tissue mobilization was utilized and necessary because of the patient's restricted joint motion, painful spasm, loss of articular motion and restricted motion of soft tissue. MODALITIES: (see flow sheet below for minutes):      to decrease pain     Date: 06/12/17 06/14/17 (visit 2) 06/16/17 (visit 3) 06/20/17 (visit 4)    Modalities:        Cervical traction Sent home with cervical traction unit.  Instructed to perform to 17# for 15 min                       Therapeutic Exercise:        Stretching Supine manual stretching of levator and upper trap musculature [id number] to R                                               Proprioceptive Activities:                                Manual Therapy: 15 min 45 min 45 min 45 min    STM In prone: trigger point release through cervical and thoracic musculature, STM to levator, cervical and shoulder musculature B repeat repeat repeat    Passive physiologic mobilizations  Manual stretching into R lateral cervical flexion grade 2 to 3- [id number] repeat repeat    Passive accessory mobilizations In supine: lateral cervical glides L to R and R to L gr 2-3 to C4 thru 6; In prone PA mobilization gr 2-3 to C5 and 6 transverse processes repeat repeat Supine, lateral cervical glides L to R to C3-4 and 4-5; manual distraction; PA mobilizations to C3 and C4 L transverse process    Cervical traction Manual intermittent performed Manual, grade 3 to 3++  repeat             Therapeutic Activities:                                        HEP: See 06/12/17 flow sheet for specifics  Treatment/Session Assessment:  Noted weakness with shoulder flexion and ER. Good strength noted with scapular elevation. · Pain/ Symptoms: Initial:   6/10 \"I still can't sleep very well. It seemed like I was really weak yesterday when I was trying to lift (with curl to press exercise). \" Post Session:  5/10 ·   Compliance with Program/Exercises: Will assess as treatment progresses. · Recommendations/Intent for next treatment session: \"Next visit will focus on advancements to more challenging activities\".   Total Treatment Duration:  PT Patient Time In/Time Out  Time In: 0845  Time Out: [address] [facility], DPT

## 2017-06-21 ENCOUNTER — HOSPITAL ENCOUNTER (OUTPATIENT)
Dept: PHYSICAL THERAPY | Age: 67
Discharge: HOME | End: 2017-06-21
Payer: COMMERCIAL

## 2017-06-21 PROCEDURE — 97140 MANUAL THERAPY 1/> REGIONS: CPT

## 2017-06-21 NOTE — PROGRESS NOTES
Radha Flowers  : 1950 [id number] [facility],[address]  [address] [place] [address].  [phone]   [phone]        OUTPATIENT PHYSICAL THERAPY:Daily Note 2017        ICD-10: Treatment Diagnosis: Cervicalgia (M54.2)  Precautions/Allergies:   Review of patient's allergies indicates no known allergies. Fall Risk Score: 1 (? 5 = High Risk)  MD Orders: evaluate and treat MEDICAL/REFERRING DIAGNOSIS:  Cervicalgia [M54.2]   DATE OF ONSET: 17  REFERRING PHYSICIAN: Fede Somers [id number]: to be determined     INITIAL ASSESSMENT:  Mr. Dexter Hill presents to therapy with recurrence of chronic neck pain secondary to cervical spondylosis that is classified as responding to gentle mobilization categorization. He has restricted mobility in the cervical spine secondary to stiffness of the mid and lower left cervical joints and increased tone and stiffness of the cervical paraspinals and levator musculature. Further, trigger points are present through the thoracic paraspinals and cervical paraspinals further contributing to increased pain. He has weakness of the lower trap musculature, deep flexor musculature and cervical extensors. This causes increased pain and limited capacity for prolonged work tasks or reading. He has no signs of radicular symptoms or diminished motor nerve function. Skilled therapy is required to return to prior level of function. PROBLEM LIST (Impacting functional limitations):  1. Decreased Strength  2. Decreased ADL/Functional Activities  3. Increased Pain  4. Decreased Activity Tolerance  5. Decreased Flexibility/Joint Mobility INTERVENTIONS PLANNED:  1. Cryotherapy  2. Electrical Stimulation  3. Heat  4. Manual Therapy  5. Neuromuscular Re-education/Strengthening  6. Range of Motion (ROM)  7. Therapeutic Activites  8.  Therapeutic Exercise/Strengthening  9. modalities, including cervical traction   TREATMENT PLAN:  Effective Dates: 06/12/17 TO 09/12/17. Frequency/Duration: 3 times a week for 12 weeks  GOALS: (Goals have been discussed and agreed upon with patient.)  Short-Term Functional Goals: Time Frame: 2 weeks  1. Patient will be independent with HEP. 2. Patient will decrease pain to no greater than 5/10 to allow him to sleep through the night. 3. Patient will improve lateral cervical flexion to 30 ° to improve cervical mobility and flexibility of the levator musculature. Discharge Goals: Time Frame: 12 weeks  1. Patient will be able to read or do computer work for >1 hour without pain to return to prior level of function. 2. Patient will report pain <2/10 with all daily activity. 3. Patient will improve cervical rotation to >70 ° and lateral cervical flexion to >50 ° to normalize cervical mobility and decrease joint irritability. Rehabilitation Potential For Stated Goals: Excellent                HISTORY:   History of Present Injury/Illness (Reason for Referral):  Patient presents to therapy with primary complaint of bilateral neck pain, left greater than right. Symptoms began on above date following an extended afternoon/evening of computer work and reading. This initially caused significant difficulty with sleeping that night and he has continued to have difficulty sleeping. Symptoms are described as a severe stiffness and aching pain through the left lateral neck. This increases with all movements into rotation or lateral flexion. He notes severity of symptoms up to 9/10 over the past few days and has had difficulty with sleeping greater than 3 hours and is unable to sleep laying in bed. He denies any loss of strength through the arms or shoulders. Past Medical History/Comorbidities:   Mr. Sunshine Coffey  has a past medical history of Acute sinusitis; Bilateral impacted cerumen; Cerumen impaction; Chronic sinusitis; Conductive hearing loss; Esophageal reflux; Hearing loss;  Hearing loss due to cerumen impaction; and Itching of ear. Mr. María Lee  has a past surgical history that includes back surgery and other surgical.  Social History/Living Environment:     Patient lives at home with his wife. Prior Level of Function/Work/Activity:  Patient is a  and enjoys running and working out on a regular basis. Dominant Side:         RIGHT  Current Medications:    Current Outpatient Prescriptions:     aspirin delayed-release 81 mg tablet, Take 81 mg by mouth., Disp: , Rfl:     cetirizine (ZYRTEC) 10 mg tablet, 10 mg., Disp: , Rfl:     tadalafil (CIALIS) 5 mg tablet, 5 mg., Disp: , Rfl:     ciclopirox (LOPROX) 0.77 % susp, APPLY TO THE AFFECTED TOENAILS TWICE DAILY, Disp: , Rfl: 11   Date Last Reviewed:  2017   EXAMINATION:   Observation/Orthostatic Postural Assessment:          Mild forward head posture. Palpation:          Trigger points present through right and left thoracic paraspinals. Stiffness noted with PA mobilizations of thoracic spine. Provocation of symptoms noted with palpation of C5-6 transverse processes and levator musculature. ROM:     Cervical extension: limited 25%  Cervical flexion: WNL  Lateral cervical flexion: 25 ° to R; 30 ° to L both with provocation of left sided symptoms  Cervical rotation: 50 ° to R; 45 ° to L both with provocation of left sided symptoms  Shoulder mobility is full      Strength:     Myotomal strength is full  Deep cervical flexion: 3/5  Cervical extension: 3/5   Shoulder shru/5 B  Shoulder scaption: 5/5 B      Special Tests:          Spurling's (-); Distraction (+); Compression (-); ULTT (-); Concordant sign noted with PA and lateral glide mobilization to C4-5 and C5-6; Stiffness noted with mobilization of L first rib. Neurological Screen:        Reflexes and sensation are fully intact. Functional Mobility:         WNL  Balance: WNL   Body Structures Involved:  1. Nerves  2. Bones  3. Joints  4. Muscles  5.  Ligaments Body Functions Affected:  1. Sensory/Pain  2. Neuromusculoskeletal  3. Movement Related Activities and Participation Affected:  1. General Tasks and Demands  2. Communication  3. Self Care  4. Domestic Life  5. Interpersonal Interactions and Relationships  6. Community, Social and Civic Life   CLINICAL PRESENTATION:   CLINICAL DECISION MAKING:   Outcome Measure: Tool Used: VAS  Score:  Initial: 8/10 Most Recent: X (Date: -- )   Interpretation of Score: 80% impaired with activities due to increased pain  Outcome Measure Conversion Site      Medical Necessity:   · Patient is expected to demonstrate progress in strength, range of motion, balance and coordination to increase independence with activities that involve movement of the neck and to decrease pain. Reason for Services/Other Comments:  · Patient has demonstrated an improvement in functional level by independent performance of HEP. TREATMENT:   (In addition to Assessment/Re-Assessment sessions the following treatments were rendered)  THERAPEUTIC EXERCISE: (see flow sheet below for minutes):  Exercises per grid below to improve mobility, strength, balance and coordination. Required minimal verbal and manual cues to promote proper body alignment, promote proper body posture and promote proper body mechanics. Progressed resistance, range, repetitions and complexity of movement as indicated. MANUAL THERAPY: (see flow sheet below for minutes): Joint mobilization and Soft tissue mobilization was utilized and necessary because of the patient's restricted joint motion, painful spasm, loss of articular motion and restricted motion of soft tissue. MODALITIES: (see flow sheet below for minutes):      to decrease pain     Date: 06/12/17 06/14/17 (visit 2) 06/16/17 (visit 3) 06/20/17 (visit 4) 06/21/17 (visit 5)   Modalities:        Cervical traction Sent home with cervical traction unit.  Instructed to perform to 17# for 15 min                       Therapeutic Exercise: Stretching Supine manual stretching of levator and upper trap musculature [id number] to R                                               Proprioceptive Activities:                                Manual Therapy: 15 min 45 min 45 min 45 min 45 min   STM In prone: trigger point release through cervical and thoracic musculature, STM to levator, cervical and shoulder musculature B repeat repeat repeat repeat   Passive physiologic mobilizations  Manual stretching into R lateral cervical flexion grade 2 to 3- 2a00pmc repeat repeat repeat   Passive accessory mobilizations In supine: lateral cervical glides L to R and R to L gr 2-3 to C4 thru 6; In prone PA mobilization gr 2-3 to C5 and 6 transverse processes repeat repeat Supine, lateral cervical glides L to R to C3-4 and 4-5; manual distraction; PA mobilizations to C3 and C4 L transverse process repeat   Cervical traction Manual intermittent performed Manual, grade 3 to 3++  repeat  repeat           Therapeutic Activities:                                        HEP: See 06/12/17 flow sheet for specifics  Treatment/Session Assessment:  Lateral flexion is improved, however overpressure into right lateral flexion remains painful. · Pain/ Symptoms: Initial:   4/10 \"I was actually able to stay in bed all night last night. \" Post Session:  5/10 ·   Compliance with Program/Exercises: Will assess as treatment progresses. · Recommendations/Intent for next treatment session: \"Next visit will focus on advancements to more challenging activities\".   Total Treatment Duration:  PT Patient Time In/Time Out  Time In: 0800  Time Out: [facility], [facility]

## 2017-06-29 ENCOUNTER — HOSPITAL ENCOUNTER (OUTPATIENT)
Dept: PHYSICAL THERAPY | Age: 67
Discharge: HOME | End: 2017-06-29
Payer: COMMERCIAL

## 2017-06-29 PROCEDURE — 97140 MANUAL THERAPY 1/> REGIONS: CPT

## 2017-06-29 NOTE — PROGRESS NOTES
Guanaco Phipps  : 1950 [id number] [facility],[address]  [address] [place] [address].  [phone]   [phone]        OUTPATIENT PHYSICAL THERAPY:Daily Note 2017        ICD-10: Treatment Diagnosis: Cervicalgia (M54.2)  Precautions/Allergies:   Review of patient's allergies indicates no known allergies. Fall Risk Score: 1 (? 5 = High Risk)  MD Orders: evaluate and treat MEDICAL/REFERRING DIAGNOSIS:  Cervicalgia [M54.2]   DATE OF ONSET: 17  REFERRING PHYSICIAN: Fede Colorado 2906: to be determined     INITIAL ASSESSMENT:  Mr. Deven Caro presents to therapy with recurrence of chronic neck pain secondary to cervical spondylosis that is classified as responding to gentle mobilization categorization. He has restricted mobility in the cervical spine secondary to stiffness of the mid and lower left cervical joints and increased tone and stiffness of the cervical paraspinals and levator musculature. Further, trigger points are present through the thoracic paraspinals and cervical paraspinals further contributing to increased pain. He has weakness of the lower trap musculature, deep flexor musculature and cervical extensors. This causes increased pain and limited capacity for prolonged work tasks or reading. He has no signs of radicular symptoms or diminished motor nerve function. Skilled therapy is required to return to prior level of function. PROBLEM LIST (Impacting functional limitations):  1. Decreased Strength  2. Decreased ADL/Functional Activities  3. Increased Pain  4. Decreased Activity Tolerance  5. Decreased Flexibility/Joint Mobility INTERVENTIONS PLANNED:  1. Cryotherapy  2. Electrical Stimulation  3. Heat  4. Manual Therapy  5. Neuromuscular Re-education/Strengthening  6. Range of Motion (ROM)  7. Therapeutic Activites  8.  Therapeutic Exercise/Strengthening  9. modalities, including cervical traction   TREATMENT PLAN:  Effective Dates: 06/12/17 TO 09/12/17. Frequency/Duration: 3 times a week for 12 weeks  GOALS: (Goals have been discussed and agreed upon with patient.)  Short-Term Functional Goals: Time Frame: 2 weeks  1. Patient will be independent with HEP. 2. Patient will decrease pain to no greater than 5/10 to allow him to sleep through the night. 3. Patient will improve lateral cervical flexion to 30 ° to improve cervical mobility and flexibility of the levator musculature. Discharge Goals: Time Frame: 12 weeks  1. Patient will be able to read or do computer work for >1 hour without pain to return to prior level of function. 2. Patient will report pain <2/10 with all daily activity. 3. Patient will improve cervical rotation to >70 ° and lateral cervical flexion to >50 ° to normalize cervical mobility and decrease joint irritability. Rehabilitation Potential For Stated Goals: Excellent                HISTORY:   History of Present Injury/Illness (Reason for Referral):  Patient presents to therapy with primary complaint of bilateral neck pain, left greater than right. Symptoms began on above date following an extended afternoon/evening of computer work and reading. This initially caused significant difficulty with sleeping that night and he has continued to have difficulty sleeping. Symptoms are described as a severe stiffness and aching pain through the left lateral neck. This increases with all movements into rotation or lateral flexion. He notes severity of symptoms up to 9/10 over the past few days and has had difficulty with sleeping greater than 3 hours and is unable to sleep laying in bed. He denies any loss of strength through the arms or shoulders. Past Medical History/Comorbidities:   Mr. Vaishali Myrick  has a past medical history of Acute sinusitis; Bilateral impacted cerumen; Cerumen impaction; Chronic sinusitis; Conductive hearing loss; Esophageal reflux; Hearing loss;  Hearing loss due to cerumen impaction; and Itching of ear. Mr. Marlen Smith  has a past surgical history that includes back surgery and other surgical.  Social History/Living Environment:     Patient lives at home with his wife. Prior Level of Function/Work/Activity:  Patient is a  and enjoys running and working out on a regular basis. Dominant Side:         RIGHT  Current Medications:    Current Outpatient Prescriptions:     aspirin delayed-release 81 mg tablet, Take 81 mg by mouth., Disp: , Rfl:     cetirizine (ZYRTEC) 10 mg tablet, 10 mg., Disp: , Rfl:     tadalafil (CIALIS) 5 mg tablet, 5 mg., Disp: , Rfl:     ciclopirox (LOPROX) 0.77 % susp, APPLY TO THE AFFECTED TOENAILS TWICE DAILY, Disp: , Rfl: 11   Date Last Reviewed:  2017   EXAMINATION:   Observation/Orthostatic Postural Assessment:          Mild forward head posture. Palpation:          Trigger points present through right and left thoracic paraspinals. Stiffness noted with PA mobilizations of thoracic spine. Provocation of symptoms noted with palpation of C5-6 transverse processes and levator musculature. ROM:     Cervical extension: limited 25%  Cervical flexion: WNL  Lateral cervical flexion: 25 ° to R; 30 ° to L both with provocation of left sided symptoms  Cervical rotation: 50 ° to R; 45 ° to L both with provocation of left sided symptoms  Shoulder mobility is full      Strength:     Myotomal strength is full  Deep cervical flexion: 3/5  Cervical extension: 3/5   Shoulder shru/5 B  Shoulder scaption: 5/5 B      Special Tests:          Spurling's (-); Distraction (+); Compression (-); ULTT (-); Concordant sign noted with PA and lateral glide mobilization to C4-5 and C5-6; Stiffness noted with mobilization of L first rib. Neurological Screen:        Reflexes and sensation are fully intact. Functional Mobility:         WNL  Balance: WNL   Body Structures Involved:  1. Nerves  2. Bones  3. Joints  4. Muscles  5.  Ligaments Body Functions Affected:  1. Sensory/Pain  2. Neuromusculoskeletal  3. Movement Related Activities and Participation Affected:  1. General Tasks and Demands  2. Communication  3. Self Care  4. Domestic Life  5. Interpersonal Interactions and Relationships  6. Community, Social and Civic Life   CLINICAL PRESENTATION:   CLINICAL DECISION MAKING:   Outcome Measure: Tool Used: VAS  Score:  Initial: 8/10 Most Recent: X (Date: -- )   Interpretation of Score: 80% impaired with activities due to increased pain  Outcome Measure Conversion Site      Medical Necessity:   · Patient is expected to demonstrate progress in strength, range of motion, balance and coordination to increase independence with activities that involve movement of the neck and to decrease pain. Reason for Services/Other Comments:  · Patient has demonstrated an improvement in functional level by independent performance of HEP. TREATMENT:   (In addition to Assessment/Re-Assessment sessions the following treatments were rendered)  THERAPEUTIC EXERCISE: (see flow sheet below for minutes):  Exercises per grid below to improve mobility, strength, balance and coordination. Required minimal verbal and manual cues to promote proper body alignment, promote proper body posture and promote proper body mechanics. Progressed resistance, range, repetitions and complexity of movement as indicated. MANUAL THERAPY: (see flow sheet below for minutes): Joint mobilization and Soft tissue mobilization was utilized and necessary because of the patient's restricted joint motion, painful spasm, loss of articular motion and restricted motion of soft tissue. MODALITIES: (see flow sheet below for minutes):      to decrease pain     Date: 06/12/17 06/14/17 (visit 2) 06/16/17 (visit 3) 06/20/17 (visit 4) 06/21/17 (visit 5) 06/29/17 (visit 6)     Modalities:           Cervical traction Sent home with cervical traction unit.  Instructed to perform to 17# for 15 min Therapeutic Exercise:           Stretching Supine manual stretching of levator and upper trap musculature [id number] to R                                                                 Proprioceptive Activities:                                            Manual Therapy: 15 min 45 min 45 min 45 min 45 min 45 min     STM In prone: trigger point release through cervical and thoracic musculature, STM to levator, cervical and shoulder musculature B repeat repeat repeat repeat With trigger point release to thoracic paraspinals; STM to cervical paraspinals to improve rotation; contract relax into supine cervical rotation [id number]     Passive physiologic mobilizations  Manual stretching into R lateral cervical flexion grade 2 to 3- [id number] repeat repeat repeat      Passive accessory mobilizations In supine: lateral cervical glides L to R and R to L gr 2-3 to C4 thru 6; In prone PA mobilization gr 2-3 to C5 and 6 transverse processes repeat repeat Supine, lateral cervical glides L to R to C3-4 and 4-5; manual distraction; PA mobilizations to C3 and C4 L transverse process repeat Manual cervical traction, suboccipital release; gr 4 to 4++ lateral cervical glides L to R to C3 through C5     Cervical traction Manual intermittent performed Manual, grade 3 to 3++  repeat  repeat                 Therapeutic Activities:                                                       HEP: See 06/12/17 flow sheet for specifics  Treatment/Session Assessment:  Trigger point at thoracic paraspinals lateral to T3 (left) contribute to referred pain into the posterior cervical spine. Continues to have mild weakness with shoulder flexion. · Pain/ Symptoms: Initial:   3/10 \"I still have pain and tightness when I turn my head either way, but I'm sleeping much better. \" Post Session:  5/10 ·   Compliance with Program/Exercises: Will assess as treatment progresses. · Recommendations/Intent for next treatment session:  \"Next visit will focus on advancements to more challenging activities\".   Total Treatment Duration:  PT Patient Time In/Time Out  Time In: 0845  Time Out: [address], DPT

## 2017-06-30 ENCOUNTER — HOSPITAL ENCOUNTER (OUTPATIENT)
Dept: PHYSICAL THERAPY | Age: 67
Discharge: HOME | End: 2017-06-30
Payer: COMMERCIAL

## 2017-06-30 PROCEDURE — 97140 MANUAL THERAPY 1/> REGIONS: CPT

## 2017-06-30 NOTE — PROGRESS NOTES
Ole Nino  : 1950 [address] [facility],[address]  [address] [place] [address].  [phone]   [phone]        OUTPATIENT PHYSICAL THERAPY:Daily Note 2017        ICD-10: Treatment Diagnosis: Cervicalgia (M54.2)  Precautions/Allergies:   Review of patient's allergies indicates no known allergies. Fall Risk Score: 1 (? 5 = High Risk)  MD Orders: evaluate and treat MEDICAL/REFERRING DIAGNOSIS:  Cervicalgia [M54.2]   DATE OF ONSET: 17  REFERRING PHYSICIAN: Fede Rai 2906: to be determined     INITIAL ASSESSMENT:  Mr. Tyrese Silva presents to therapy with recurrence of chronic neck pain secondary to cervical spondylosis that is classified as responding to gentle mobilization categorization. He has restricted mobility in the cervical spine secondary to stiffness of the mid and lower left cervical joints and increased tone and stiffness of the cervical paraspinals and levator musculature. Further, trigger points are present through the thoracic paraspinals and cervical paraspinals further contributing to increased pain. He has weakness of the lower trap musculature, deep flexor musculature and cervical extensors. This causes increased pain and limited capacity for prolonged work tasks or reading. He has no signs of radicular symptoms or diminished motor nerve function. Skilled therapy is required to return to prior level of function. PROBLEM LIST (Impacting functional limitations):  1. Decreased Strength  2. Decreased ADL/Functional Activities  3. Increased Pain  4. Decreased Activity Tolerance  5. Decreased Flexibility/Joint Mobility INTERVENTIONS PLANNED:  1. Cryotherapy  2. Electrical Stimulation  3. Heat  4. Manual Therapy  5. Neuromuscular Re-education/Strengthening  6. Range of Motion (ROM)  7. Therapeutic Activites  8.  Therapeutic Exercise/Strengthening  9. modalities, including cervical traction   TREATMENT PLAN:  Effective Dates: 06/12/17 TO 09/12/17. Frequency/Duration: 3 times a week for 12 weeks  GOALS: (Goals have been discussed and agreed upon with patient.)  Short-Term Functional Goals: Time Frame: 2 weeks  1. Patient will be independent with HEP. 2. Patient will decrease pain to no greater than 5/10 to allow him to sleep through the night. 3. Patient will improve lateral cervical flexion to 30 ° to improve cervical mobility and flexibility of the levator musculature. Discharge Goals: Time Frame: 12 weeks  1. Patient will be able to read or do computer work for >1 hour without pain to return to prior level of function. 2. Patient will report pain <2/10 with all daily activity. 3. Patient will improve cervical rotation to >70 ° and lateral cervical flexion to >50 ° to normalize cervical mobility and decrease joint irritability. Rehabilitation Potential For Stated Goals: Excellent                HISTORY:   History of Present Injury/Illness (Reason for Referral):  Patient presents to therapy with primary complaint of bilateral neck pain, left greater than right. Symptoms began on above date following an extended afternoon/evening of computer work and reading. This initially caused significant difficulty with sleeping that night and he has continued to have difficulty sleeping. Symptoms are described as a severe stiffness and aching pain through the left lateral neck. This increases with all movements into rotation or lateral flexion. He notes severity of symptoms up to 9/10 over the past few days and has had difficulty with sleeping greater than 3 hours and is unable to sleep laying in bed. He denies any loss of strength through the arms or shoulders. Past Medical History/Comorbidities:   Mr. Giovanni Pham  has a past medical history of Acute sinusitis; Bilateral impacted cerumen; Cerumen impaction; Chronic sinusitis; Conductive hearing loss; Esophageal reflux; Hearing loss;  Hearing loss due to cerumen impaction; and Itching of ear. Mr. Marlen Smith  has a past surgical history that includes back surgery and other surgical.  Social History/Living Environment:     Patient lives at home with his wife. Prior Level of Function/Work/Activity:  Patient is a  and enjoys running and working out on a regular basis. Dominant Side:         RIGHT  Current Medications:    Current Outpatient Prescriptions:     aspirin delayed-release 81 mg tablet, Take 81 mg by mouth., Disp: , Rfl:     cetirizine (ZYRTEC) 10 mg tablet, 10 mg., Disp: , Rfl:     tadalafil (CIALIS) 5 mg tablet, 5 mg., Disp: , Rfl:     ciclopirox (LOPROX) 0.77 % susp, APPLY TO THE AFFECTED TOENAILS TWICE DAILY, Disp: , Rfl: 11   Date Last Reviewed:  2017   EXAMINATION:   Observation/Orthostatic Postural Assessment:          Mild forward head posture. Palpation:          Trigger points present through right and left thoracic paraspinals. Stiffness noted with PA mobilizations of thoracic spine. Provocation of symptoms noted with palpation of C5-6 transverse processes and levator musculature. ROM:     Cervical extension: limited 25%  Cervical flexion: WNL  Lateral cervical flexion: 25 ° to R; 30 ° to L both with provocation of left sided symptoms  Cervical rotation: 50 ° to R; 45 ° to L both with provocation of left sided symptoms  Shoulder mobility is full      Strength:     Myotomal strength is full  Deep cervical flexion: 3/5  Cervical extension: 3/5   Shoulder shru/5 B  Shoulder scaption: 5/5 B      Special Tests:          Spurling's (-); Distraction (+); Compression (-); ULTT (-); Concordant sign noted with PA and lateral glide mobilization to C4-5 and C5-6; Stiffness noted with mobilization of L first rib. Neurological Screen:        Reflexes and sensation are fully intact. Functional Mobility:         WNL  Balance: WNL   Body Structures Involved:  1. Nerves  2. Bones  3. Joints  4. Muscles  5.  Ligaments Body Functions Affected:  1. Sensory/Pain  2. Neuromusculoskeletal  3. Movement Related Activities and Participation Affected:  1. General Tasks and Demands  2. Communication  3. Self Care  4. Domestic Life  5. Interpersonal Interactions and Relationships  6. Community, Social and Civic Life   CLINICAL PRESENTATION:   CLINICAL DECISION MAKING:   Outcome Measure: Tool Used: VAS  Score:  Initial: 8/10 Most Recent: X (Date: -- )   Interpretation of Score: 80% impaired with activities due to increased pain  Outcome Measure Conversion Site      Medical Necessity:   · Patient is expected to demonstrate progress in strength, range of motion, balance and coordination to increase independence with activities that involve movement of the neck and to decrease pain. Reason for Services/Other Comments:  · Patient has demonstrated an improvement in functional level by independent performance of HEP. TREATMENT:   (In addition to Assessment/Re-Assessment sessions the following treatments were rendered)  THERAPEUTIC EXERCISE: (see flow sheet below for minutes):  Exercises per grid below to improve mobility, strength, balance and coordination. Required minimal verbal and manual cues to promote proper body alignment, promote proper body posture and promote proper body mechanics. Progressed resistance, range, repetitions and complexity of movement as indicated. MANUAL THERAPY: (see flow sheet below for minutes): Joint mobilization and Soft tissue mobilization was utilized and necessary because of the patient's restricted joint motion, painful spasm, loss of articular motion and restricted motion of soft tissue. MODALITIES: (see flow sheet below for minutes):      to decrease pain     Date: 06/12/17 06/14/17 (visit 2) 06/16/17 (visit 3) 06/20/17 (visit 4) 06/21/17 (visit 5) 06/29/17 (visit 6) 06/30/17 (visit 7)    Modalities:           Cervical traction Sent home with cervical traction unit.  Instructed to perform to 17# for 15 min Therapeutic Exercise:           Stretching Supine manual stretching of levator and upper trap musculature [id number] to R                                                                 Proprioceptive Activities:                                            Manual Therapy: 15 min 45 min 45 min 45 min 45 min 45 min 45 min    STM In prone: trigger point release through cervical and thoracic musculature, STM to levator, cervical and shoulder musculature B repeat repeat repeat repeat With trigger point release to thoracic paraspinals; STM to cervical paraspinals to improve rotation; contract relax into supine cervical rotation [id number] repeat    Passive physiologic mobilizations  Manual stretching into R lateral cervical flexion grade 2 to 3- [id number] repeat repeat repeat      Passive accessory mobilizations In supine: lateral cervical glides L to R and R to L gr 2-3 to C4 thru 6; In prone PA mobilization gr 2-3 to C5 and 6 transverse processes repeat repeat Supine, lateral cervical glides L to R to C3-4 and 4-5; manual distraction; PA mobilizations to C3 and C4 L transverse process repeat Manual cervical traction, suboccipital release; gr 4 to 4++ lateral cervical glides L to R to C3 through C5 repeat    Cervical traction Manual intermittent performed Manual, grade 3 to 3++  repeat  repeat  repeat               Therapeutic Activities:                                                       HEP: See 06/12/17 flow sheet for specifics  Treatment/Session Assessment:  No progression today due to significant improvement in symptoms. Continues to have pain with overpressure into lateral flexion. · Pain/ Symptoms: Initial:   2/10 \"I feel much better today. \" Post Session:  5/10 ·   Compliance with Program/Exercises: Will assess as treatment progresses. · Recommendations/Intent for next treatment session: \"Next visit will focus on advancements to more challenging activities\".   Total Treatment Duration:  PT Patient Time In/Time Out  Time In: 1015  Time Out: 632 [facility], DPT

## 2017-07-03 ENCOUNTER — HOSPITAL ENCOUNTER (OUTPATIENT)
Dept: PHYSICAL THERAPY | Age: 67
Discharge: HOME | End: 2017-07-03
Payer: COMMERCIAL

## 2017-07-03 PROCEDURE — 97140 MANUAL THERAPY 1/> REGIONS: CPT

## 2017-07-03 NOTE — PROGRESS NOTES
Victoria Salas  : 1950 [address] [facility],[address]  [address] [place] [address].  [phone]   [phone]        OUTPATIENT PHYSICAL THERAPY:Daily Note 7/3/2017        ICD-10: Treatment Diagnosis: Cervicalgia (M54.2)  Precautions/Allergies:   Review of patient's allergies indicates no known allergies. Fall Risk Score: 1 (? 5 = High Risk)  MD Orders: evaluate and treat MEDICAL/REFERRING DIAGNOSIS:  Cervicalgia [M54.2]   DATE OF ONSET: 17  REFERRING PHYSICIAN: Fede Harrison 2900: to be determined     INITIAL ASSESSMENT:  Mr. Giovanni Pham presents to therapy with recurrence of chronic neck pain secondary to cervical spondylosis that is classified as responding to gentle mobilization categorization. He has restricted mobility in the cervical spine secondary to stiffness of the mid and lower left cervical joints and increased tone and stiffness of the cervical paraspinals and levator musculature. Further, trigger points are present through the thoracic paraspinals and cervical paraspinals further contributing to increased pain. He has weakness of the lower trap musculature, deep flexor musculature and cervical extensors. This causes increased pain and limited capacity for prolonged work tasks or reading. He has no signs of radicular symptoms or diminished motor nerve function. Skilled therapy is required to return to prior level of function. PROBLEM LIST (Impacting functional limitations):  1. Decreased Strength  2. Decreased ADL/Functional Activities  3. Increased Pain  4. Decreased Activity Tolerance  5. Decreased Flexibility/Joint Mobility INTERVENTIONS PLANNED:  1. Cryotherapy  2. Electrical Stimulation  3. Heat  4. Manual Therapy  5. Neuromuscular Re-education/Strengthening  6. Range of Motion (ROM)  7. Therapeutic Activites  8.  Therapeutic Exercise/Strengthening  9. modalities, including cervical traction   TREATMENT PLAN:  Effective Dates: 06/12/17 TO 09/12/17. Frequency/Duration: 3 times a week for 12 weeks  GOALS: (Goals have been discussed and agreed upon with patient.)  Short-Term Functional Goals: Time Frame: 2 weeks  1. Patient will be independent with HEP. 2. Patient will decrease pain to no greater than 5/10 to allow him to sleep through the night. 3. Patient will improve lateral cervical flexion to 30 ° to improve cervical mobility and flexibility of the levator musculature. Discharge Goals: Time Frame: 12 weeks  1. Patient will be able to read or do computer work for >1 hour without pain to return to prior level of function. 2. Patient will report pain <2/10 with all daily activity. 3. Patient will improve cervical rotation to >70 ° and lateral cervical flexion to >50 ° to normalize cervical mobility and decrease joint irritability. Rehabilitation Potential For Stated Goals: Excellent                HISTORY:   History of Present Injury/Illness (Reason for Referral):  Patient presents to therapy with primary complaint of bilateral neck pain, left greater than right. Symptoms began on above date following an extended afternoon/evening of computer work and reading. This initially caused significant difficulty with sleeping that night and he has continued to have difficulty sleeping. Symptoms are described as a severe stiffness and aching pain through the left lateral neck. This increases with all movements into rotation or lateral flexion. He notes severity of symptoms up to 9/10 over the past few days and has had difficulty with sleeping greater than 3 hours and is unable to sleep laying in bed. He denies any loss of strength through the arms or shoulders. Past Medical History/Comorbidities:   Mr. Deven Caro  has a past medical history of Acute sinusitis; Bilateral impacted cerumen; Cerumen impaction; Chronic sinusitis; Conductive hearing loss; Esophageal reflux; Hearing loss;  Hearing loss due to cerumen impaction; and Itching of ear. Mr. Shanna Grimes  has a past surgical history that includes back surgery and other surgical.  Social History/Living Environment:     Patient lives at home with his wife. Prior Level of Function/Work/Activity:  Patient is a  and enjoys running and working out on a regular basis. Dominant Side:         RIGHT  Current Medications:    Current Outpatient Prescriptions:     aspirin delayed-release 81 mg tablet, Take 81 mg by mouth., Disp: , Rfl:     cetirizine (ZYRTEC) 10 mg tablet, 10 mg., Disp: , Rfl:     tadalafil (CIALIS) 5 mg tablet, 5 mg., Disp: , Rfl:     ciclopirox (LOPROX) 0.77 % susp, APPLY TO THE AFFECTED TOENAILS TWICE DAILY, Disp: , Rfl: 11   Date Last Reviewed:  7/3/2017   EXAMINATION:   Observation/Orthostatic Postural Assessment:          Mild forward head posture. Palpation:          Trigger points present through right and left thoracic paraspinals. Stiffness noted with PA mobilizations of thoracic spine. Provocation of symptoms noted with palpation of C5-6 transverse processes and levator musculature. ROM:     Cervical extension: limited 25%  Cervical flexion: WNL  Lateral cervical flexion: 25 ° to R; 30 ° to L both with provocation of left sided symptoms  Cervical rotation: 50 ° to R; 45 ° to L both with provocation of left sided symptoms  Shoulder mobility is full      Strength:     Myotomal strength is full  Deep cervical flexion: 3/5  Cervical extension: 3/5   Shoulder shru/5 B  Shoulder scaption: 5/5 B      Special Tests:          Spurling's (-); Distraction (+); Compression (-); ULTT (-); Concordant sign noted with PA and lateral glide mobilization to C4-5 and C5-6; Stiffness noted with mobilization of L first rib. Neurological Screen:        Reflexes and sensation are fully intact. Functional Mobility:         WNL  Balance: WNL   Body Structures Involved:  1. Nerves  2. Bones  3. Joints  4. Muscles  5.  Ligaments Body Functions Affected:  1. Sensory/Pain  2. Neuromusculoskeletal  3. Movement Related Activities and Participation Affected:  1. General Tasks and Demands  2. Communication  3. Self Care  4. Domestic Life  5. Interpersonal Interactions and Relationships  6. Community, Social and Civic Life   CLINICAL PRESENTATION:   CLINICAL DECISION MAKING:   Outcome Measure: Tool Used: VAS  Score:  Initial: 8/10 Most Recent: X (Date: -- )   Interpretation of Score: 80% impaired with activities due to increased pain  Outcome Measure Conversion Site      Medical Necessity:   · Patient is expected to demonstrate progress in strength, range of motion, balance and coordination to increase independence with activities that involve movement of the neck and to decrease pain. Reason for Services/Other Comments:  · Patient has demonstrated an improvement in functional level by independent performance of HEP. TREATMENT:   (In addition to Assessment/Re-Assessment sessions the following treatments were rendered)  THERAPEUTIC EXERCISE: (see flow sheet below for minutes):  Exercises per grid below to improve mobility, strength, balance and coordination. Required minimal verbal and manual cues to promote proper body alignment, promote proper body posture and promote proper body mechanics. Progressed resistance, range, repetitions and complexity of movement as indicated. MANUAL THERAPY: (see flow sheet below for minutes): Joint mobilization and Soft tissue mobilization was utilized and necessary because of the patient's restricted joint motion, painful spasm, loss of articular motion and restricted motion of soft tissue. MODALITIES: (see flow sheet below for minutes):      to decrease pain     Date: 06/12/17 06/14/17 (visit 2) 06/16/17 (visit 3) 06/20/17 (visit 4) 06/21/17 (visit 5) 06/29/17 (visit 6) 06/30/17 (visit 7) 07/03/17 (visit 8)   Modalities:           Cervical traction Sent home with cervical traction unit.  Instructed to perform to 17# for 15 min                                Therapeutic Exercise:           Stretching Supine manual stretching of levator and upper trap musculature [id number] to R                                                                 Proprioceptive Activities:                                            Manual Therapy: 15 min 45 min 45 min 45 min 45 min 45 min 45 min 45 min   STM In prone: trigger point release through cervical and thoracic musculature, STM to levator, cervical and shoulder musculature B repeat repeat repeat repeat With trigger point release to thoracic paraspinals; STM to cervical paraspinals to improve rotation; contract relax into supine cervical rotation [id number] repeat Repeat, added contract relax with cervical rotation [id number] each direction in supine   Passive physiologic mobilizations  Manual stretching into R lateral cervical flexion grade 2 to 3- [id number] repeat repeat repeat   Passive stretching into cervical rotation and lateral flexion   Passive accessory mobilizations In supine: lateral cervical glides L to R and R to L gr 2-3 to C4 thru 6; In prone PA mobilization gr 2-3 to C5 and 6 transverse processes repeat repeat Supine, lateral cervical glides L to R to C3-4 and 4-5; manual distraction; PA mobilizations to C3 and C4 L transverse process repeat Manual cervical traction, suboccipital release; gr 4 to 4++ lateral cervical glides L to R to C3 through C5 repeat repeat   Cervical traction Manual intermittent performed Manual, grade 3 to 3++  repeat  repeat  repeat               Therapeutic Activities:                                                       HEP: See 06/12/17 flow sheet for specifics  Treatment/Session Assessment:  Sleep has normalized. He has restored functional cervical rotation however end range remains painful. · Pain/ Symptoms: Initial:   2/10 \"It feels about the same today. \" Post Session:  5/10 ·   Compliance with Program/Exercises:  Will assess as treatment progresses. · Recommendations/Intent for next treatment session: \"Next visit will focus on advancements to more challenging activities\".   Total Treatment Duration:  PT Patient Time In/Time Out  Time In: 7957  Time Out: FIORDALIZA Lara

## 2017-07-06 ENCOUNTER — HOSPITAL ENCOUNTER (OUTPATIENT)
Dept: PHYSICAL THERAPY | Age: 67
Discharge: HOME | End: 2017-07-06
Payer: COMMERCIAL

## 2017-07-06 PROCEDURE — 97140 MANUAL THERAPY 1/> REGIONS: CPT

## 2017-07-06 NOTE — PROGRESS NOTES
Joe Goff  : 1950 [id number] [facility],[address]  [address] [place] [address].  [phone]   [phone]        OUTPATIENT PHYSICAL THERAPY:Daily Note 2017        ICD-10: Treatment Diagnosis: Cervicalgia (M54.2)  Precautions/Allergies:   Review of patient's allergies indicates no known allergies. Fall Risk Score: 1 (? 5 = High Risk)  MD Orders: evaluate and treat MEDICAL/REFERRING DIAGNOSIS:  Cervicalgia [M54.2]   DATE OF ONSET: 17  REFERRING PHYSICIAN: Fede Monroe 2909: to be determined     INITIAL ASSESSMENT:  Mr. Kavon Gutierrez presents to therapy with recurrence of chronic neck pain secondary to cervical spondylosis that is classified as responding to gentle mobilization categorization. He has restricted mobility in the cervical spine secondary to stiffness of the mid and lower left cervical joints and increased tone and stiffness of the cervical paraspinals and levator musculature. Further, trigger points are present through the thoracic paraspinals and cervical paraspinals further contributing to increased pain. He has weakness of the lower trap musculature, deep flexor musculature and cervical extensors. This causes increased pain and limited capacity for prolonged work tasks or reading. He has no signs of radicular symptoms or diminished motor nerve function. Skilled therapy is required to return to prior level of function. PROBLEM LIST (Impacting functional limitations):  1. Decreased Strength  2. Decreased ADL/Functional Activities  3. Increased Pain  4. Decreased Activity Tolerance  5. Decreased Flexibility/Joint Mobility INTERVENTIONS PLANNED:  1. Cryotherapy  2. Electrical Stimulation  3. Heat  4. Manual Therapy  5. Neuromuscular Re-education/Strengthening  6. Range of Motion (ROM)  7. Therapeutic Activites  8.  Therapeutic Exercise/Strengthening  9. modalities, including cervical traction   TREATMENT PLAN:  Effective Dates: 06/12/17 TO 09/12/17. Frequency/Duration: 3 times a week for 12 weeks  GOALS: (Goals have been discussed and agreed upon with patient.)  Short-Term Functional Goals: Time Frame: 2 weeks  1. Patient will be independent with HEP. 2. Patient will decrease pain to no greater than 5/10 to allow him to sleep through the night. 3. Patient will improve lateral cervical flexion to 30 ° to improve cervical mobility and flexibility of the levator musculature. Discharge Goals: Time Frame: 12 weeks  1. Patient will be able to read or do computer work for >1 hour without pain to return to prior level of function. 2. Patient will report pain <2/10 with all daily activity. 3. Patient will improve cervical rotation to >70 ° and lateral cervical flexion to >50 ° to normalize cervical mobility and decrease joint irritability. Rehabilitation Potential For Stated Goals: Excellent                HISTORY:   History of Present Injury/Illness (Reason for Referral):  Patient presents to therapy with primary complaint of bilateral neck pain, left greater than right. Symptoms began on above date following an extended afternoon/evening of computer work and reading. This initially caused significant difficulty with sleeping that night and he has continued to have difficulty sleeping. Symptoms are described as a severe stiffness and aching pain through the left lateral neck. This increases with all movements into rotation or lateral flexion. He notes severity of symptoms up to 9/10 over the past few days and has had difficulty with sleeping greater than 3 hours and is unable to sleep laying in bed. He denies any loss of strength through the arms or shoulders. Past Medical History/Comorbidities:   Mr. Moni Chappell  has a past medical history of Acute sinusitis; Bilateral impacted cerumen; Cerumen impaction; Chronic sinusitis; Conductive hearing loss; Esophageal reflux; Hearing loss;  Hearing loss due to cerumen impaction; and Itching of ear. Mr. Toya Lira  has a past surgical history that includes back surgery and other surgical.  Social History/Living Environment:     Patient lives at home with his wife. Prior Level of Function/Work/Activity:  Patient is a  and enjoys running and working out on a regular basis. Dominant Side:         RIGHT  Current Medications:    Current Outpatient Prescriptions:     aspirin delayed-release 81 mg tablet, Take 81 mg by mouth., Disp: , Rfl:     cetirizine (ZYRTEC) 10 mg tablet, 10 mg., Disp: , Rfl:     tadalafil (CIALIS) 5 mg tablet, 5 mg., Disp: , Rfl:     ciclopirox (LOPROX) 0.77 % susp, APPLY TO THE AFFECTED TOENAILS TWICE DAILY, Disp: , Rfl: 11   Date Last Reviewed:  2017   EXAMINATION:   Observation/Orthostatic Postural Assessment:          Mild forward head posture. Palpation:          Trigger points present through right and left thoracic paraspinals. Stiffness noted with PA mobilizations of thoracic spine. Provocation of symptoms noted with palpation of C5-6 transverse processes and levator musculature. ROM:     Cervical extension: limited 25%  Cervical flexion: WNL  Lateral cervical flexion: 25 ° to R; 30 ° to L both with provocation of left sided symptoms  Cervical rotation: 50 ° to R; 45 ° to L both with provocation of left sided symptoms  Shoulder mobility is full      Strength:     Myotomal strength is full  Deep cervical flexion: 3/5  Cervical extension: 3/5   Shoulder shru/5 B  Shoulder scaption: 5/5 B      Special Tests:          Spurling's (-); Distraction (+); Compression (-); ULTT (-); Concordant sign noted with PA and lateral glide mobilization to C4-5 and C5-6; Stiffness noted with mobilization of L first rib. Neurological Screen:        Reflexes and sensation are fully intact. Functional Mobility:         WNL  Balance: WNL   Body Structures Involved:  1. Nerves  2. Bones  3. Joints  4. Muscles  5.  Ligaments Body Functions Affected:  1. Sensory/Pain  2. Neuromusculoskeletal  3. Movement Related Activities and Participation Affected:  1. General Tasks and Demands  2. Communication  3. Self Care  4. Domestic Life  5. Interpersonal Interactions and Relationships  6. Community, Social and Civic Life   CLINICAL PRESENTATION:   CLINICAL DECISION MAKING:   Outcome Measure: Tool Used: VAS  Score:  Initial: 8/10 Most Recent: X (Date: -- )   Interpretation of Score: 80% impaired with activities due to increased pain  Outcome Measure Conversion Site      Medical Necessity:   · Patient is expected to demonstrate progress in strength, range of motion, balance and coordination to increase independence with activities that involve movement of the neck and to decrease pain. Reason for Services/Other Comments:  · Patient has demonstrated an improvement in functional level by independent performance of HEP. TREATMENT:   (In addition to Assessment/Re-Assessment sessions the following treatments were rendered)  THERAPEUTIC EXERCISE: (see flow sheet below for minutes):  Exercises per grid below to improve mobility, strength, balance and coordination. Required minimal verbal and manual cues to promote proper body alignment, promote proper body posture and promote proper body mechanics. Progressed resistance, range, repetitions and complexity of movement as indicated. MANUAL THERAPY: (see flow sheet below for minutes): Joint mobilization and Soft tissue mobilization was utilized and necessary because of the patient's restricted joint motion, painful spasm, loss of articular motion and restricted motion of soft tissue. MODALITIES: (see flow sheet below for minutes):      to decrease pain     Date: 06/12/17 06/14/17 (visit 2) 06/16/17 (visit 3) 06/20/17 (visit 4) 06/21/17 (visit 5) 06/29/17 (visit 6) 06/30/17 (visit 7) 07/03/17 (visit 8) 07/06/17 (visit 9)   Modalities:            Cervical traction Sent home with cervical traction unit.  Instructed to perform to 17# for 15 min                                   Therapeutic Exercise:            Stretching Supine manual stretching of levator and upper trap musculature [id number] to R                                                                       Proprioceptive Activities:                                                Manual Therapy: 15 min 45 min 45 min 45 min 45 min 45 min 45 min 45 min 45 min   STM In prone: trigger point release through cervical and thoracic musculature, STM to levator, cervical and shoulder musculature B repeat repeat repeat repeat With trigger point release to thoracic paraspinals; STM to cervical paraspinals to improve rotation; contract relax into supine cervical rotation [id number] repeat Repeat, added contract relax with cervical rotation [id number] each direction in supine Repeat, including contract relax to cervical rotation and lateral cervical flexion [id number] each; further STM and TRP release to thoracic paraspinals   Passive physiologic mobilizations  Manual stretching into R lateral cervical flexion grade 2 to 3- [id number] repeat repeat repeat   Passive stretching into cervical rotation and lateral flexion Repeat, added sidelying thoracic rotation 10x B   Passive accessory mobilizations In supine: lateral cervical glides L to R and R to L gr 2-3 to C4 thru 6;  In prone PA mobilization gr 2-3 to C5 and 6 transverse processes repeat repeat Supine, lateral cervical glides L to R to C3-4 and 4-5; manual distraction; PA mobilizations to C3 and C4 L transverse process repeat Manual cervical traction, suboccipital release; gr 4 to 4++ lateral cervical glides L to R to C3 through C5 repeat repeat repeat   Cervical traction Manual intermittent performed Manual, grade 3 to 3++  repeat  repeat  repeat  Supine, manual including suboccipital release               Therapeutic Activities:                                                            HEP: See 06/12/17 flow sheet for specifics  Treatment/Session Assessment:  Addressed further trigger points through thoracic spine to improve thoracic mobility for increased rotation. · Pain/ Symptoms: Initial:   1/10 \"My sleep is getting better and better. It's still painful when I try to turn my head all the way. \" Post Session:  5/10 ·   Compliance with Program/Exercises: Will assess as treatment progresses. · Recommendations/Intent for next treatment session: \"Next visit will focus on advancements to more challenging activities\".   Total Treatment Duration:  PT Patient Time In/Time Out  Time In: 3322  Time Out: FIORDALIZA Lara

## 2017-07-24 ENCOUNTER — HOSPITAL ENCOUNTER (OUTPATIENT)
Dept: PHYSICAL THERAPY | Age: 67
Discharge: HOME | End: 2017-07-24
Payer: COMMERCIAL

## 2017-07-24 PROCEDURE — 97140 MANUAL THERAPY 1/> REGIONS: CPT

## 2017-07-24 NOTE — PROGRESS NOTES
Kojo Thayer  : 1950 [address] [facility],[address]  [address] [place] [address].  [phone]   [phone]        OUTPATIENT PHYSICAL THERAPY:Daily Note and Progress Report 2017        ICD-10: Treatment Diagnosis: Cervicalgia (M54.2)  Precautions/Allergies:   Review of patient's allergies indicates no known allergies. Fall Risk Score: 1 (? 5 = High Risk)  MD Orders: evaluate and treat MEDICAL/REFERRING DIAGNOSIS:  Cervicalgia [M54.2]   DATE OF ONSET: 17  REFERRING PHYSICIAN: Fede Gutiérrez 2906: to be determined     INITIAL ASSESSMENT:  Mr. Kalyn Mike presents to therapy with recurrence of chronic neck pain secondary to cervical spondylosis that is classified as responding to gentle mobilization categorization. He has restricted mobility in the cervical spine secondary to stiffness of the mid and lower left cervical joints and increased tone and stiffness of the cervical paraspinals and levator musculature. Further, trigger points are present through the thoracic paraspinals and cervical paraspinals further contributing to increased pain. He has weakness of the lower trap musculature, deep flexor musculature and cervical extensors. This causes increased pain and limited capacity for prolonged work tasks or reading. He has no signs of radicular symptoms or diminished motor nerve function. Skilled therapy is required to return to prior level of function. PROBLEM LIST (Impacting functional limitations):  1. Decreased Strength  2. Decreased ADL/Functional Activities  3. Increased Pain  4. Decreased Activity Tolerance  5. Decreased Flexibility/Joint Mobility INTERVENTIONS PLANNED:  1. Cryotherapy  2. Electrical Stimulation  3. Heat  4. Manual Therapy  5. Neuromuscular Re-education/Strengthening  6. Range of Motion (ROM)  7. Therapeutic Activites  8.  Therapeutic Exercise/Strengthening  9. modalities, including cervical traction   TREATMENT PLAN:  Effective Dates: 06/12/17 TO 09/12/17. Frequency/Duration: 3 times a week for 12 weeks  GOALS: (Goals have been discussed and agreed upon with patient.)  Short-Term Functional Goals: Time Frame: 2 weeks  1. Patient will be independent with HEP. MET  2. Patient will decrease pain to no greater than 5/10 to allow him to sleep through the night. MET  3. Patient will improve lateral cervical flexion to 30 ° to improve cervical mobility and flexibility of the levator musculature. MET  Discharge Goals: Time Frame: 12 weeks  1. Patient will be able to read or do computer work for >1 hour without pain to return to prior level of function. PROGRESSING  2. Patient will report pain <2/10 with all daily activity. PROGRESSING  3. Patient will improve cervical rotation to >70 ° and lateral cervical flexion to >50 ° to normalize cervical mobility and decrease joint irritability. PROGRESSING  Rehabilitation Potential For Stated Goals: Excellent                HISTORY:   History of Present Injury/Illness (Reason for Referral):  Patient presents to therapy with primary complaint of bilateral neck pain, left greater than right. Symptoms began on above date following an extended afternoon/evening of computer work and reading. This initially caused significant difficulty with sleeping that night and he has continued to have difficulty sleeping. Symptoms are described as a severe stiffness and aching pain through the left lateral neck. This increases with all movements into rotation or lateral flexion. He notes severity of symptoms up to 9/10 over the past few days and has had difficulty with sleeping greater than 3 hours and is unable to sleep laying in bed. He denies any loss of strength through the arms or shoulders.    Past Medical History/Comorbidities:   Mr. Giovanni Pham  has a past medical history of Acute sinusitis; Bilateral impacted cerumen; Cerumen impaction; Chronic sinusitis; Conductive hearing loss; Esophageal reflux; Hearing loss; Hearing loss due to cerumen impaction; and Itching of ear. Mr. Willie Zazueta  has a past surgical history that includes back surgery and other surgical.  Social History/Living Environment:     Patient lives at home with his wife. Prior Level of Function/Work/Activity:  Patient is a  and enjoys running and working out on a regular basis. Dominant Side:         RIGHT  Current Medications:    Current Outpatient Prescriptions:     aspirin delayed-release 81 mg tablet, Take 81 mg by mouth., Disp: , Rfl:     cetirizine (ZYRTEC) 10 mg tablet, 10 mg., Disp: , Rfl:     tadalafil (CIALIS) 5 mg tablet, 5 mg., Disp: , Rfl:     ciclopirox (LOPROX) 0.77 % susp, APPLY TO THE AFFECTED TOENAILS TWICE DAILY, Disp: , Rfl: 11   Date Last Reviewed:  2017   EXAMINATION:   Observation/Orthostatic Postural Assessment:          Mild forward head posture. Palpation:          Trigger points present through right and left thoracic paraspinals. Stiffness noted with PA mobilizations of thoracic spine. Provocation of symptoms noted with palpation of C5-6 transverse processes and levator musculature. ROM:     Cervical extension: limited 25%  Cervical flexion: WNL  Lateral cervical flexion: 25 ° to R; 30 ° to L both with provocation of left sided symptoms  Cervical rotation: 50 ° to R; 45 ° to L both with provocation of left sided symptoms  Shoulder mobility is full   17 update:   Cervical extension: Full  Cervical rotation: 70 ° R; 60 ° L with pain at end range  Lateral cervical flexion: 40 ° B   Strength:     Myotomal strength is full  Deep cervical flexion: 3/5  Cervical extension: 3/5   Shoulder shru/5 B  Shoulder scaption: 5/5 B      Special Tests:          Spurling's (-); Distraction (+); Compression (-); ULTT (-); Concordant sign noted with PA and lateral glide mobilization to C4-5 and C5-6; Stiffness noted with mobilization of L first rib.    Neurological Screen: Reflexes and sensation are fully intact. Functional Mobility:         WNL  Balance: WNL   Body Structures Involved:  1. Nerves  2. Bones  3. Joints  4. Muscles  5. Ligaments Body Functions Affected:  1. Sensory/Pain  2. Neuromusculoskeletal  3. Movement Related Activities and Participation Affected:  1. General Tasks and Demands  2. Communication  3. Self Care  4. Domestic Life  5. Interpersonal Interactions and Relationships  6. Community, Social and Civic Life   CLINICAL PRESENTATION:   CLINICAL DECISION MAKING:   Outcome Measure: Tool Used: VAS  Score:  Initial: 8/10 Most Recent: 1/10 (Date: 07/24/17 )   Interpretation of Score: 80% impaired with activities due to increased pain  Outcome Measure Conversion Site      Medical Necessity:   · Patient is expected to demonstrate progress in strength, range of motion, balance and coordination to increase independence with activities that involve movement of the neck and to decrease pain. Reason for Services/Other Comments:  · Patient has demonstrated an improvement in functional level by independent performance of HEP. TREATMENT:   (In addition to Assessment/Re-Assessment sessions the following treatments were rendered)  THERAPEUTIC EXERCISE: (see flow sheet below for minutes):  Exercises per grid below to improve mobility, strength, balance and coordination. Required minimal verbal and manual cues to promote proper body alignment, promote proper body posture and promote proper body mechanics. Progressed resistance, range, repetitions and complexity of movement as indicated. MANUAL THERAPY: (see flow sheet below for minutes): Joint mobilization and Soft tissue mobilization was utilized and necessary because of the patient's restricted joint motion, painful spasm, loss of articular motion and restricted motion of soft tissue.    MODALITIES: (see flow sheet below for minutes):      to decrease pain     Date: 06/12/17 06/14/17 (visit 2) 06/16/17 (visit 3) 06/20/17 (visit 4) 06/21/17 (visit 5) 06/29/17 (visit 6) 06/30/17 (visit 7) 07/03/17 (visit 8) 07/06/17 (visit 9) 07/24/17 (visit 10)     Modalities:               Cervical traction Sent home with cervical traction unit. Instructed to perform to 17# for 15 min                                            Therapeutic Exercise:               Stretching Supine manual stretching of levator and upper trap musculature [id number] to R                                                                                         Proprioceptive Activities:                                                            Manual Therapy: 15 min 45 min 45 min 45 min 45 min 45 min 45 min 45 min 45 min 45 min     STM In prone: trigger point release through cervical and thoracic musculature, STM to levator, cervical and shoulder musculature B repeat repeat repeat repeat With trigger point release to thoracic paraspinals; STM to cervical paraspinals to improve rotation; contract relax into supine cervical rotation [id number] repeat Repeat, added contract relax with cervical rotation [id number] each direction in supine Repeat, including contract relax to cervical rotation and lateral cervical flexion [id number] each; further STM and TRP release to thoracic paraspinals repeat     Passive physiologic mobilizations  Manual stretching into R lateral cervical flexion grade 2 to 3- [id number] repeat repeat repeat   Passive stretching into cervical rotation and lateral flexion Repeat, added sidelying thoracic rotation 10x B repeat     Passive accessory mobilizations In supine: lateral cervical glides L to R and R to L gr 2-3 to C4 thru 6;  In prone PA mobilization gr 2-3 to C5 and 6 transverse processes repeat repeat Supine, lateral cervical glides L to R to C3-4 and 4-5; manual distraction; PA mobilizations to C3 and C4 L transverse process repeat Manual cervical traction, suboccipital release; gr 4 to 4++ lateral cervical glides L to R to C3 through C5 repeat repeat repeat repeat     Cervical traction Manual intermittent performed Manual, grade 3 to 3++  repeat  repeat  repeat  Supine, manual including suboccipital release repeat                    Therapeutic Activities:                                                                           HEP: See 06/12/17 flow sheet for specifics  Treatment/Session Assessment:  No progression in treatment today due to progress note and to reevaluate tissue quality following recent vacation over the past 2 weeks. Continues to have improved tone through the cervical paraspinals. · Pain/ Symptoms: Initial:   1/10 \"I'm feeling much better. The sleep is going well. I can still feel it when I turn my head all the way to the left. \" Post Session:  1/10 ·   Compliance with Program/Exercises: Will assess as treatment progresses. · Recommendations/Intent for next treatment session: \"Next visit will focus on advancements to more challenging activities\".   Total Treatment Duration:  PT Patient Time In/Time Out  Time In: 0930  Time Out: [address] [facility]

## 2017-07-26 ENCOUNTER — HOSPITAL ENCOUNTER (OUTPATIENT)
Dept: PHYSICAL THERAPY | Age: 67
Discharge: HOME | End: 2017-07-26
Payer: COMMERCIAL

## 2017-07-26 PROCEDURE — 97140 MANUAL THERAPY 1/> REGIONS: CPT

## 2017-07-26 NOTE — PROGRESS NOTES
Carina Sandhu  : 1950 [address] [place].  [phone]   [phone]        OUTPATIENT PHYSICAL THERAPY:Daily Note 2017        ICD-10: Treatment Diagnosis: Cervicalgia (M54.2)  Precautions/Allergies:   Review of patient's allergies indicates no known allergies. Fall Risk Score: 1 (? 5 = High Risk)  MD Orders: evaluate and treat MEDICAL/REFERRING DIAGNOSIS:  Cervicalgia [M54.2]   DATE OF ONSET: 17  REFERRING PHYSICIAN: Fede Agustin 2903: to be determined     INITIAL ASSESSMENT:  Mr. Isai Joyce presents to therapy with recurrence of chronic neck pain secondary to cervical spondylosis that is classified as responding to gentle mobilization categorization. He has restricted mobility in the cervical spine secondary to stiffness of the mid and lower left cervical joints and increased tone and stiffness of the cervical paraspinals and levator musculature. Further, trigger points are present through the thoracic paraspinals and cervical paraspinals further contributing to increased pain. He has weakness of the lower trap musculature, deep flexor musculature and cervical extensors. This causes increased pain and limited capacity for prolonged work tasks or reading. He has no signs of radicular symptoms or diminished motor nerve function. Skilled therapy is required to return to prior level of function. PROBLEM LIST (Impacting functional limitations):  1. Decreased Strength  2. Decreased ADL/Functional Activities  3. Increased Pain  4. Decreased Activity Tolerance  5. Decreased Flexibility/Joint Mobility INTERVENTIONS PLANNED:  1. Cryotherapy  2. Electrical Stimulation  3. Heat  4. Manual Therapy  5. Neuromuscular Re-education/Strengthening  6. Range of Motion (ROM)  7. Therapeutic Activites  8.  Therapeutic Exercise/Strengthening  9. modalities, including cervical traction   TREATMENT PLAN:  Effective Dates: 06/12/17 TO 09/12/17. Frequency/Duration: 3 times a week for 12 weeks  GOALS: (Goals have been discussed and agreed upon with patient.)  Short-Term Functional Goals: Time Frame: 2 weeks  1. Patient will be independent with HEP. MET  2. Patient will decrease pain to no greater than 5/10 to allow him to sleep through the night. MET  3. Patient will improve lateral cervical flexion to 30 ° to improve cervical mobility and flexibility of the levator musculature. MET  Discharge Goals: Time Frame: 12 weeks  1. Patient will be able to read or do computer work for >1 hour without pain to return to prior level of function. PROGRESSING  2. Patient will report pain <2/10 with all daily activity. PROGRESSING  3. Patient will improve cervical rotation to >70 ° and lateral cervical flexion to >50 ° to normalize cervical mobility and decrease joint irritability. PROGRESSING  Rehabilitation Potential For Stated Goals: Excellent                HISTORY:   History of Present Injury/Illness (Reason for Referral):  Patient presents to therapy with primary complaint of bilateral neck pain, left greater than right. Symptoms began on above date following an extended afternoon/evening of computer work and reading. This initially caused significant difficulty with sleeping that night and he has continued to have difficulty sleeping. Symptoms are described as a severe stiffness and aching pain through the left lateral neck. This increases with all movements into rotation or lateral flexion. He notes severity of symptoms up to 9/10 over the past few days and has had difficulty with sleeping greater than 3 hours and is unable to sleep laying in bed. He denies any loss of strength through the arms or shoulders.    Past Medical History/Comorbidities:   Mr. Kalyan See  has a past medical history of Acute sinusitis; Bilateral impacted cerumen; Cerumen impaction; Chronic sinusitis; Conductive hearing loss; Esophageal reflux; Hearing loss; Hearing loss due to cerumen impaction; and Itching of ear. Mr. Jovi Oconnor  has a past surgical history that includes back surgery and other surgical.  Social History/Living Environment:     Patient lives at home with his wife. Prior Level of Function/Work/Activity:  Patient is a  and enjoys running and working out on a regular basis. Dominant Side:         RIGHT  Current Medications:    Current Outpatient Prescriptions:     aspirin delayed-release 81 mg tablet, Take 81 mg by mouth., Disp: , Rfl:     cetirizine (ZYRTEC) 10 mg tablet, 10 mg., Disp: , Rfl:     tadalafil (CIALIS) 5 mg tablet, 5 mg., Disp: , Rfl:     ciclopirox (LOPROX) 0.77 % susp, APPLY TO THE AFFECTED TOENAILS TWICE DAILY, Disp: , Rfl: 11   Date Last Reviewed:  2017   EXAMINATION:   Observation/Orthostatic Postural Assessment:          Mild forward head posture. Palpation:          Trigger points present through right and left thoracic paraspinals. Stiffness noted with PA mobilizations of thoracic spine. Provocation of symptoms noted with palpation of C5-6 transverse processes and levator musculature. ROM:     Cervical extension: limited 25%  Cervical flexion: WNL  Lateral cervical flexion: 25 ° to R; 30 ° to L both with provocation of left sided symptoms  Cervical rotation: 50 ° to R; 45 ° to L both with provocation of left sided symptoms  Shoulder mobility is full   17 update:   Cervical extension: Full  Cervical rotation: 70 ° R; 60 ° L with pain at end range  Lateral cervical flexion: 40 ° B   Strength:     Myotomal strength is full  Deep cervical flexion: 3/5  Cervical extension: 3/5   Shoulder shru/5 B  Shoulder scaption: 5/5 B      Special Tests:          Spurling's (-); Distraction (+); Compression (-); ULTT (-); Concordant sign noted with PA and lateral glide mobilization to C4-5 and C5-6; Stiffness noted with mobilization of L first rib.    Neurological Screen:        Reflexes and sensation are fully intact. Functional Mobility:         WNL  Balance: WNL   Body Structures Involved:  1. Nerves  2. Bones  3. Joints  4. Muscles  5. Ligaments Body Functions Affected:  1. Sensory/Pain  2. Neuromusculoskeletal  3. Movement Related Activities and Participation Affected:  1. General Tasks and Demands  2. Communication  3. Self Care  4. Domestic Life  5. Interpersonal Interactions and Relationships  6. Community, Social and Civic Life   CLINICAL PRESENTATION:   CLINICAL DECISION MAKING:   Outcome Measure: Tool Used: VAS  Score:  Initial: 8/10 Most Recent: 1/10 (Date: 07/24/17 )   Interpretation of Score: 80% impaired with activities due to increased pain  Outcome Measure Conversion Site      Medical Necessity:   · Patient is expected to demonstrate progress in strength, range of motion, balance and coordination to increase independence with activities that involve movement of the neck and to decrease pain. Reason for Services/Other Comments:  · Patient has demonstrated an improvement in functional level by independent performance of HEP. TREATMENT:   (In addition to Assessment/Re-Assessment sessions the following treatments were rendered)  THERAPEUTIC EXERCISE: (see flow sheet below for minutes):  Exercises per grid below to improve mobility, strength, balance and coordination. Required minimal verbal and manual cues to promote proper body alignment, promote proper body posture and promote proper body mechanics. Progressed resistance, range, repetitions and complexity of movement as indicated. MANUAL THERAPY: (see flow sheet below for minutes): Joint mobilization and Soft tissue mobilization was utilized and necessary because of the patient's restricted joint motion, painful spasm, loss of articular motion and restricted motion of soft tissue.    MODALITIES: (see flow sheet below for minutes):      to decrease pain     Date: 06/12/17 06/14/17 (visit 2) 06/16/17 (visit 3) 06/20/17 (visit 4) 06/21/17 (visit 5) 06/29/17 (visit 6) 06/30/17 (visit 7) 07/03/17 (visit 8) 07/06/17 (visit 9) 07/24/17 (visit 10) 07/26/17 (visit 11)    Modalities:               Cervical traction Sent home with cervical traction unit. Instructed to perform to 17# for 15 min                                            Therapeutic Exercise:               Stretching Supine manual stretching of levator and upper trap musculature [id number] to R                                                                                         Proprioceptive Activities:                                                            Manual Therapy: 15 min 45 min 45 min 45 min 45 min 45 min 45 min 45 min 45 min 45 min 45 min    STM In prone: trigger point release through cervical and thoracic musculature, STM to levator, cervical and shoulder musculature B repeat repeat repeat repeat With trigger point release to thoracic paraspinals; STM to cervical paraspinals to improve rotation; contract relax into supine cervical rotation [id number] repeat Repeat, added contract relax with cervical rotation [id number] each direction in supine Repeat, including contract relax to cervical rotation and lateral cervical flexion 20s1saf each; further STM and TRP release to thoracic paraspinals repeat To paraspinals and QL    Passive physiologic mobilizations  Manual stretching into R lateral cervical flexion grade 2 to 3- [id number] repeat repeat repeat   Passive stretching into cervical rotation and lateral flexion Repeat, added sidelying thoracic rotation 10x B repeat     Passive accessory mobilizations In supine: lateral cervical glides L to R and R to L gr 2-3 to C4 thru 6;  In prone PA mobilization gr 2-3 to C5 and 6 transverse processes repeat repeat Supine, lateral cervical glides L to R to C3-4 and 4-5; manual distraction; PA mobilizations to C3 and C4 L transverse process repeat Manual cervical traction, suboccipital release; gr 4 to 4++ lateral cervical glides L to R to C3 through C5 repeat repeat repeat repeat To lateral transverse processes    Cervical traction Manual intermittent performed Manual, grade 3 to 3++  repeat  repeat  repeat  Supine, manual including suboccipital release repeat repeat                   Therapeutic Activities:                                                                           HEP: See 06/12/17 flow sheet for specifics  Treatment/Session Assessment:   Increased focus on STM of the paraspinals decreases muscle tone and improves pain. Discussed stretching regimen modifications to assist with pain control. · Pain/ Symptoms: Initial:   1/10 \"I've been having a bit more pain down in my low back. It just seems tight and I can't really do anything to help stretch it out. \" Post Session:  0/10 ·   Compliance with Program/Exercises: Will assess as treatment progresses. · Recommendations/Intent for next treatment session: \"Next visit will focus on advancements to more challenging activities\".   Total Treatment Duration:  PT Patient Time In/Time Out  Time In: 0930  Time Out: 1699 [facility], [facility]

## 2017-07-31 ENCOUNTER — HOSPITAL ENCOUNTER (OUTPATIENT)
Dept: PHYSICAL THERAPY | Age: 67
Discharge: HOME | End: 2017-07-31
Payer: COMMERCIAL

## 2017-07-31 PROCEDURE — 97140 MANUAL THERAPY 1/> REGIONS: CPT

## 2017-07-31 NOTE — PROGRESS NOTES
Acacia Neumann  : 1950 [address] [place] [address],[address]  [address] [place] [address]  [phone]   [phone]        OUTPATIENT PHYSICAL THERAPY:Daily Note 2017        ICD-10: Treatment Diagnosis: Cervicalgia (M54.2)  Precautions/Allergies:   Review of patient's allergies indicates no known allergies. Fall Risk Score: 1 (? 5 = High Risk)  MD Orders: evaluate and treat MEDICAL/REFERRING DIAGNOSIS:  Cervicalgia [M54.2]   DATE OF ONSET: 17  REFERRING PHYSICIAN: Mindi Rinne, Joaquin Suarez 2906: to be determined     INITIAL ASSESSMENT:  Mr. Rosa Borja presents to therapy with recurrence of chronic neck pain secondary to cervical spondylosis that is classified as responding to gentle mobilization categorization. He has restricted mobility in the cervical spine secondary to stiffness of the mid and lower left cervical joints and increased tone and stiffness of the cervical paraspinals and levator musculature. Further, trigger points are present through the thoracic paraspinals and cervical paraspinals further contributing to increased pain. He has weakness of the lower trap musculature, deep flexor musculature and cervical extensors. This causes increased pain and limited capacity for prolonged work tasks or reading. He has no signs of radicular symptoms or diminished motor nerve function. Skilled therapy is required to return to prior level of function. PROBLEM LIST (Impacting functional limitations):  1. Decreased Strength  2. Decreased ADL/Functional Activities  3. Increased Pain  4. Decreased Activity Tolerance  5. Decreased Flexibility/Joint Mobility INTERVENTIONS PLANNED:  1. Cryotherapy  2. Electrical Stimulation  3. Heat  4. Manual Therapy  5. Neuromuscular Re-education/Strengthening  6. Range of Motion (ROM)  7. Therapeutic Activites  8.  Therapeutic Exercise/Strengthening  9. modalities, including cervical traction   TREATMENT PLAN:  Effective Dates: 06/12/17 TO 09/12/17. Frequency/Duration: 3 times a week for 12 weeks  GOALS: (Goals have been discussed and agreed upon with patient.)  Short-Term Functional Goals: Time Frame: 2 weeks  1. Patient will be independent with HEP. MET  2. Patient will decrease pain to no greater than 5/10 to allow him to sleep through the night. MET  3. Patient will improve lateral cervical flexion to 30 ° to improve cervical mobility and flexibility of the levator musculature. MET  Discharge Goals: Time Frame: 12 weeks  1. Patient will be able to read or do computer work for >1 hour without pain to return to prior level of function. PROGRESSING  2. Patient will report pain <2/10 with all daily activity. PROGRESSING  3. Patient will improve cervical rotation to >70 ° and lateral cervical flexion to >50 ° to normalize cervical mobility and decrease joint irritability. PROGRESSING  Rehabilitation Potential For Stated Goals: Excellent                HISTORY:   History of Present Injury/Illness (Reason for Referral):  Patient presents to therapy with primary complaint of bilateral neck pain, left greater than right. Symptoms began on above date following an extended afternoon/evening of computer work and reading. This initially caused significant difficulty with sleeping that night and he has continued to have difficulty sleeping. Symptoms are described as a severe stiffness and aching pain through the left lateral neck. This increases with all movements into rotation or lateral flexion. He notes severity of symptoms up to 9/10 over the past few days and has had difficulty with sleeping greater than 3 hours and is unable to sleep laying in bed. He denies any loss of strength through the arms or shoulders.    Past Medical History/Comorbidities:   Mr. Tyrese Silva  has a past medical history of Acute sinusitis; Bilateral impacted cerumen; Cerumen impaction; Chronic sinusitis; Conductive hearing loss; Esophageal reflux; Hearing loss; Hearing loss due to cerumen impaction; and Itching of ear. Mr. Magan Michelle  has a past surgical history that includes back surgery and other surgical.  Social History/Living Environment:     Patient lives at home with his wife. Prior Level of Function/Work/Activity:  Patient is a  and enjoys running and working out on a regular basis. Dominant Side:         RIGHT  Current Medications:    Current Outpatient Prescriptions:     aspirin delayed-release 81 mg tablet, Take 81 mg by mouth., Disp: , Rfl:     cetirizine (ZYRTEC) 10 mg tablet, 10 mg., Disp: , Rfl:     tadalafil (CIALIS) 5 mg tablet, 5 mg., Disp: , Rfl:     ciclopirox (LOPROX) 0.77 % susp, APPLY TO THE AFFECTED TOENAILS TWICE DAILY, Disp: , Rfl: 11   Date Last Reviewed:  2017   EXAMINATION:   Observation/Orthostatic Postural Assessment:          Mild forward head posture. Palpation:          Trigger points present through right and left thoracic paraspinals. Stiffness noted with PA mobilizations of thoracic spine. Provocation of symptoms noted with palpation of C5-6 transverse processes and levator musculature. ROM:     Cervical extension: limited 25%  Cervical flexion: WNL  Lateral cervical flexion: 25 ° to R; 30 ° to L both with provocation of left sided symptoms  Cervical rotation: 50 ° to R; 45 ° to L both with provocation of left sided symptoms  Shoulder mobility is full   17 update:   Cervical extension: Full  Cervical rotation: 70 ° R; 60 ° L with pain at end range  Lateral cervical flexion: 40 ° B   Strength:     Myotomal strength is full  Deep cervical flexion: 3/5  Cervical extension: 3/5   Shoulder shru/5 B  Shoulder scaption: 5/5 B      Special Tests:          Spurling's (-); Distraction (+); Compression (-); ULTT (-); Concordant sign noted with PA and lateral glide mobilization to C4-5 and C5-6; Stiffness noted with mobilization of L first rib.    Neurological Screen:        Reflexes and sensation are fully intact. Functional Mobility:         WNL  Balance: WNL   Body Structures Involved:  1. Nerves  2. Bones  3. Joints  4. Muscles  5. Ligaments Body Functions Affected:  1. Sensory/Pain  2. Neuromusculoskeletal  3. Movement Related Activities and Participation Affected:  1. General Tasks and Demands  2. Communication  3. Self Care  4. Domestic Life  5. Interpersonal Interactions and Relationships  6. Community, Social and Civic Life   CLINICAL PRESENTATION:   CLINICAL DECISION MAKING:   Outcome Measure: Tool Used: VAS  Score:  Initial: 8/10 Most Recent: 1/10 (Date: 07/24/17 )   Interpretation of Score: 80% impaired with activities due to increased pain  Outcome Measure Conversion Site      Medical Necessity:   · Patient is expected to demonstrate progress in strength, range of motion, balance and coordination to increase independence with activities that involve movement of the neck and to decrease pain. Reason for Services/Other Comments:  · Patient has demonstrated an improvement in functional level by independent performance of HEP. TREATMENT:   (In addition to Assessment/Re-Assessment sessions the following treatments were rendered)  THERAPEUTIC EXERCISE: (see flow sheet below for minutes):  Exercises per grid below to improve mobility, strength, balance and coordination. Required minimal verbal and manual cues to promote proper body alignment, promote proper body posture and promote proper body mechanics. Progressed resistance, range, repetitions and complexity of movement as indicated. MANUAL THERAPY: (see flow sheet below for minutes): Joint mobilization and Soft tissue mobilization was utilized and necessary because of the patient's restricted joint motion, painful spasm, loss of articular motion and restricted motion of soft tissue.    MODALITIES: (see flow sheet below for minutes):      to decrease pain     Date: 06/12/17 06/14/17 (visit 2) 06/16/17 (visit 3) 06/20/17 (visit 4) 06/21/17 (visit 5) 06/29/17 (visit 6) 06/30/17 (visit 7) 07/03/17 (visit 8) 07/06/17 (visit 9) 07/24/17 (visit 10) 07/26/17 (visit 11) 07/31/17 (visit 12)   Modalities:               Cervical traction Sent home with cervical traction unit. Instructed to perform to 17# for 15 min                                            Therapeutic Exercise:               Stretching Supine manual stretching of levator and upper trap musculature [id number] to R                                                                                         Proprioceptive Activities:                                                            Manual Therapy: 15 min 45 min 45 min 45 min 45 min 45 min 45 min 45 min 45 min 45 min 45 min 45 min   STM In prone: trigger point release through cervical and thoracic musculature, STM to levator, cervical and shoulder musculature B repeat repeat repeat repeat With trigger point release to thoracic paraspinals; STM to cervical paraspinals to improve rotation; contract relax into supine cervical rotation [id number] repeat Repeat, added contract relax with cervical rotation [id number] each direction in supine Repeat, including contract relax to cervical rotation and lateral cervical flexion [id number] each; further STM and TRP release to thoracic paraspinals repeat To paraspinals and QL repeat   Passive physiologic mobilizations  Manual stretching into R lateral cervical flexion grade 2 to 3- [id number] repeat repeat repeat   Passive stretching into cervical rotation and lateral flexion Repeat, added sidelying thoracic rotation 10x B repeat     Passive accessory mobilizations In supine: lateral cervical glides L to R and R to L gr 2-3 to C4 thru 6;  In prone PA mobilizarepetion gr 2-3 to C5 and 6 transverse processes repeat repeat Supine, lateral cervical glides L to R to C3-4 and 4-5; manual distraction; PA mobilizations to C3 and C4 L transverse process repeat Manual cervical traction, suboccipital release; gr 4 to 4++ lateral cervical glides L to R to C3 through C5 repeat repeat repeat repeat To lateral transverse processes Gr 3 to 4 PA mobilizations to lateral transverse processes   Cervical traction Manual intermittent performed Manual, grade 3 to 3++  repeat  repeat  repeat  Supine, manual including suboccipital release repeat repeat Manual, including suboccipital release                  Therapeutic Activities:                                                                           HEP: See 06/12/17 flow sheet for specifics  Treatment/Session Assessment:   Has improved tone through the paraspinal musculature with full range of motion. Will put therapy on hold as he continues to progress with running and return to PLOF and follow up PRN. · Pain/ Symptoms: Initial:   1/10 \"Everything has been feeling much better. I'm able to read and run and the neck and back haven't really been bothering me. \" Post Session:  0/10 ·   Compliance with Program/Exercises: Will assess as treatment progresses. · Recommendations/Intent for next treatment session: \"Next visit will focus on advancements to more challenging activities\".   Total Treatment Duration:  PT Patient Time In/Time Out  Time In: 5970  Time Out: FIORDALIZA Lara

## 2017-09-06 NOTE — PROGRESS NOTES
Nevin Mabry   [phone] [facility],[address]  [address] [place] [address].  [phone]   [phone]           PHYSICIAN COMMUNICATION and discharge    REFERRING PHYSICIAN: Aarti Espinosa*  Return Physician Appointment: to be determined  MEDICAL/REFERRING DIAGNOSIS:  · Cervicalgia [M54.2]  ATTENDANCE: Nevin Mabry has attended 12 out of 12 visits, with 0 cancellation(s) and 0 no shows. ASSESSMENT:  DATE: 2017    PROGRESS: Nevin Mabry progressed very well with therapy following recurrence of left sided neck and upper trap pain. Treatment consisted of manual therapy and soft tissue mobilization to improve joint mobility of the cervical and thoracic spine and to decrease tone and tightness of the upper trap and cervical paraspinal musculature. He was also instructed in and performed a strengthening program to improve flexibility of the cervical musculature and to improve postural strength of the cervical spine and upper back musculature. At the time of his last visit, he had full cervical rotation but continued to have mild discomfort with overpressure into rotation. He had full strength of the upper back, cervical and myotomal musculature. RECOMMENDATIONS: Patient will be discharged back to your care PRN.      Thank you for this referral,  Ede Goyal DPT

## 2018-05-16 ENCOUNTER — HOSPITAL ENCOUNTER (OUTPATIENT)
Dept: PHYSICAL THERAPY | Age: 68
Discharge: HOME | End: 2018-05-16
Payer: COMMERCIAL

## 2018-05-16 PROCEDURE — 97140 MANUAL THERAPY 1/> REGIONS: CPT

## 2018-05-16 PROCEDURE — 97162 PT EVAL MOD COMPLEX 30 MIN: CPT

## 2018-05-16 PROCEDURE — 97110 THERAPEUTIC EXERCISES: CPT

## 2018-05-16 NOTE — THERAPY EVALUATION
Carrillo Jackson  : 1950 [address], [address] [place] [address]  [phone]   [id number] [id number]        OUTPATIENT PHYSICAL THERAPY:Initial Assessment 2018         ICD-10: Treatment Diagnosis: Pain in left knee (M25.562)  Precautions/Allergies:   Review of patient's allergies indicates no known allergies. Fall Risk Score: 1 (? 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 Colin Mueller presents to therapy with left knee pain secondary to chondromalacia of the patella and degenerative lateral meniscus tear. He has impairments in joint mobility of the left knee into flexion, extension, and with quadriceps flexibility. He has mild inhibition of quad strength secondary to pain. He has poor frontal and transverse plane stability of the knee that leads to increased loading of the lateral meniscus. This causes difficulty with walking, stair descent, and running tasks. Skilled therapy is required to return to prior level of activity. PROBLEM LIST (Impacting functional limitations):  1. Decreased Strength  2. Decreased ADL/Functional Activities  3. Decreased Ambulation Ability/Technique  4. Decreased Balance  5. Increased Pain  6. Decreased Activity Tolerance  7. Decreased Flexibility/Joint Mobility INTERVENTIONS PLANNED:  1. Balance Exercise  2. Cryotherapy  3. Family Education  4. Gait Training  5. Home Exercise Program (HEP)  6. Manual Therapy  7. Neuromuscular Re-education/Strengthening  8. Range of Motion (ROM)  9. Therapeutic Activites  10. Therapeutic Exercise/Strengthening  11.  modalities    TREATMENT PLAN:  Effective Dates: 2018 TO 2018 (90 days). Frequency/Duration: 2 times a week for 90 Days  GOALS: (Goals have been discussed and agreed upon with patient.)  Short-Term Functional Goals: Time Frame: 2 weeks  1. Patient will be independent with HEP. 2. Patient will have no pain with descending stairs. Discharge Goals: Time Frame: 6 weeks  1. Patient will be able to run up to 3 miles without pain to return to prior level of function. 2. Patient will restore prone knee flexion to symmetric with uninvolved to normalize joint mobility and decrease compression forces at the knee. 3. Patient will report 100% functional improvement with formal therapy. Rehabilitation Potential For Stated Goals: Excellent  Regarding Brady Vuz therapy, I certify that the treatment plan above will be carried out by a therapist or under their direction. Thank you for this referral,  Veronika Rico DPT       Referring Physician Signature: Shelbie Allen MD              Date                      HISTORY:   History of Present Injury/Illness (Reason for Referral):  Patient presents to therapy with primary complaint of left lateral knee pain. He describes symptoms as a stiffness and tightness with intermittent pressure. He noted onset of symptoms in late April with no specific mechanism of injury. He is an active running and had a good spring of training with increasing distances and paces. Initial onset of stiffness occurred the morning following a hilly interval workout. He skipped his typical long run later that weekend and attempted his typical interval run the following Tuesday, but continued to have pressure, stiffness, and tightness through the knee. He was also having increased pain with descending stairs. He had an injection yesterday and notes significant decrease in symptoms and is hesitant about resuming his prior activities. He has also undergone MRI which reveal presence of lateral meniscal tear and arthritic changes.    Past Medical History/Comorbidities:   Mr. Isai Joyce  has a past medical history of Acute sinusitis; Bilateral impacted cerumen; Cerumen impaction; Chronic sinusitis; Conductive hearing loss; Esophageal reflux; Hearing loss; Hearing loss due to cerumen impaction; and Itching of ear. Mr. Isai Joyce  has a past surgical history that includes hx back surgery and hx other surgical.  Social History/Living Environment:     Patient lives at home with his wife. Prior Level of Function/Work/Activity:  Patient is an active runner and teaches university classes. Dominant Side:         RIGHT  Current Medications:    Current Outpatient Prescriptions:     aspirin delayed-release 81 mg tablet, Take 81 mg by mouth., Disp: , Rfl:     cetirizine (ZYRTEC) 10 mg tablet, 10 mg., Disp: , Rfl:     tadalafil (CIALIS) 5 mg tablet, 5 mg., Disp: , Rfl:     ciclopirox (LOPROX) 0.77 % susp, APPLY TO THE AFFECTED TOENAILS TWICE DAILY, Disp: , Rfl: 11   Date Last Reviewed:  5/16/2018   # of Personal Factors/Comorbidities that affect the Plan of Care: 1-2: MODERATE COMPLEXITY   EXAMINATION:   Observation/Orthostatic Postural Assessment:          No gross deformity or atrophy noted. Symmetric knee alignment. Palpation:          Tenderness through L TFL, ITB, lateral joint line, and lateral patellofemoral joint. ROM:     Hip rotation is symmetric and nonprovocative  Hip extension: 15 ° B  SLR: 60 ° B with increased hamstring tightness. Knee extension: (-)1 ° L with stiff end feel; 0 ° R.  Knee flexion: 130 ° L in supine, 138 ° R; Prone knee flexion: 112 ° L, 124 ° R      Strength:     Knee extension: 4+/5 L; 5/5 R  SLR: 5/5 B  Hip abduction: 4+/5 L; 5/5 R  Hip ER: 4+/5 L; 5/5 R  Hip extension; 4+/5 B      Special Tests:          Not applicable  Neurological Screen:        Grossly intact  Functional Mobility:         Ambulates without assistive device  Balance:           WNL with mild deficit in transverse plane control   Body Structures Involved:  1. Nerves  2. Bones  3. Joints  4. Muscles  5. Ligaments Body Functions Affected:  1. Sensory/Pain  2. Neuromusculoskeletal  3. Movement Related Activities and Participation Affected:  1. General Tasks and Demands  2. Mobility  3. Self Care  4. Domestic Life  5. Interpersonal Interactions and Relationships  6. Community, Social and [place]   # of elements that affect the Plan of Care: 3: MODERATE COMPLEXITY   CLINICAL PRESENTATION:   Presentation: Evolving clinical presentation with changing clinical characteristics: MODERATE COMPLEXITY   CLINICAL DECISION MAKING:   Outcome Measure: Tool Used: Visual Analog Scale  Score:  Initial: 5/10 Most Recent: X (Date: -- )   Interpretation of Score: 50% impaired with daily activities involving use of the left LE. Outcome Measure Conversion Site    Medical Necessity:   · Patient is expected to demonstrate progress in strength, range of motion, balance and coordination to increase independence with community mobility and return to prior level of function. Reason for Services/Other Comments:  · Patient has demonstrated an improvement in functional level by independent performance of HEP. Use of outcome tool(s) and clinical judgement create a POC that gives a: Questionable prediction of patient's progress: MODERATE COMPLEXITY   TREATMENT:   (In addition to Assessment/Re-Assessment sessions the following treatments were rendered)  THERAPEUTIC EXERCISE: (see below for minutes):  Exercises per grid below to improve mobility, strength, balance and coordination. Required minimal verbal and manual cues to promote proper body alignment, promote proper body posture and promote proper body mechanics. Progressed resistance, range, repetitions and complexity of movement as indicated.   MANUAL THERAPY: (see below for minutes): Joint mobilization and Soft tissue mobilization was utilized and necessary because of the patient's restricted joint motion, painful spasm, loss of articular motion and restricted motion of soft tissue. MODALITIES: (see below for minutes):      to decrease pain     Date: 05/16/18       Modalities:                                Therapeutic Exercise: 10 min       Supine knee flexion mobilization Supine with strap, self mobilization [id number]       Lateral band walk With band around toes x3 laps       Clamshell 3x20, no resistance cueing full hip mobility       Bridge With march [id number]       Lateral plank [id number]               Proprioceptive Activities:                                Manual Therapy: 15 min       Soft tissue mobilization To anterior and lateral thigh, TFL performed               Therapeutic Activities:                                        HEP: see 05/16/18 flow sheet for specifics. [facility] [employer] Portal  Treatment/Session Assessment:  Patient is independent with performance of above described home program. Discussed reasonable activity modification. · Pain/ Symptoms: Initial:   5/10 Post Session:  No increase following today's treatment session. ·   Compliance with Program/Exercises: Will assess as treatment progresses. · Recommendations/Intent for next treatment session: \"Next visit will focus on advancements to more challenging activities\".   Total Treatment Duration:  PT Patient Time In/Time Out  Time In: 0845  Time Out: 915 [facility], [facility]

## 2018-05-16 NOTE — PROGRESS NOTES
Ambulatory/Rehab Services H2 Model Falls Risk Assessment    Risk Factor Pts. ·   Confusion/Disorientation/Impulsivity  []    4 ·   Symptomatic Depression  []   2 ·   Altered Elimination  []   1 ·   Dizziness/Vertigo  []   1 ·   Gender (Male)  [x]   1 ·   Any administered antiepileptics (anticonvulsants):  []   2 ·   Any administered benzodiazepines:  []   1 ·   Visual Impairment (specify):  []   1 ·   Portable Oxygen Use  []   1 ·   Orthostatic ? BP  []   1 ·   History of Recent Falls (within 3 mos.)  []   5     Ability to Rise from Chair (choose one) Pts. ·   Ability to rise in a single movement  [x]   0 ·   Pushes up, successful in one attempt  []   1 ·   Multiple attempts, but successful  []   3 ·   Unable to rise without assistance  []   4   Total: (5 or greater = High Risk) 1     Falls Prevention Plan:   []                Physical Limitations to Exercise (specify):   []                Mobility Assistance Device (type):   []                Exercise/Equipment Adaptation (specify):    ©2010 [facility] [address] Patent #[id number].  Federal Law prohibits the replication, distribution or use without written permission from [facility] [employer]

## 2018-05-22 ENCOUNTER — HOSPITAL ENCOUNTER (OUTPATIENT)
Dept: PHYSICAL THERAPY | Age: 68
Discharge: HOME | End: 2018-05-22
Payer: COMMERCIAL

## 2018-05-22 PROCEDURE — 97110 THERAPEUTIC EXERCISES: CPT

## 2018-05-22 PROCEDURE — 97140 MANUAL THERAPY 1/> REGIONS: CPT

## 2018-05-22 NOTE — PROGRESS NOTES
Becky Sargent  : 1950 [id number] [facility]  [address] [place] [address].  [phone]   [phone]        OUTPATIENT PHYSICAL THERAPY:Daily Note 2018         ICD-10: Treatment Diagnosis: Pain in left knee (M25.562)  Precautions/Allergies:   Review of patient's allergies indicates no known allergies. Fall Risk Score: 1 (? 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 Yo Pruett presents to therapy with left knee pain secondary to chondromalacia of the patella and degenerative lateral meniscus tear. He has impairments in joint mobility of the left knee into flexion, extension, and with quadriceps flexibility. He has mild inhibition of quad strength secondary to pain. He has poor frontal and transverse plane stability of the knee that leads to increased loading of the lateral meniscus. This causes difficulty with walking, stair descent, and running tasks. Skilled therapy is required to return to prior level of activity. PROBLEM LIST (Impacting functional limitations):  1. Decreased Strength  2. Decreased ADL/Functional Activities  3. Decreased Ambulation Ability/Technique  4. Decreased Balance  5. Increased Pain  6. Decreased Activity Tolerance  7. Decreased Flexibility/Joint Mobility INTERVENTIONS PLANNED:  1. Balance Exercise  2. Cryotherapy  3. Family Education  4. Gait Training  5. Home Exercise Program (HEP)  6. Manual Therapy  7. Neuromuscular Re-education/Strengthening  8. Range of Motion (ROM)  9. Therapeutic Activites  10. Therapeutic Exercise/Strengthening  11. modalities    TREATMENT PLAN:  Effective Dates: 2018 TO 2018 (90 days). Frequency/Duration: 2 times a week for 90 Days  GOALS: (Goals have been discussed and agreed upon with patient.)  Short-Term Functional Goals: Time Frame: 2 weeks  1. Patient will be independent with HEP. 2. Patient will have no pain with descending stairs. Discharge Goals: Time Frame: 6 weeks  1. Patient will be able to run up to 3 miles without pain to return to prior level of function. 2. Patient will restore prone knee flexion to symmetric with uninvolved to normalize joint mobility and decrease compression forces at the knee. 3. Patient will report 100% functional improvement with formal therapy. Rehabilitation Potential For Stated Goals: Excellent                HISTORY:   History of Present Injury/Illness (Reason for Referral):  Patient presents to therapy with primary complaint of left lateral knee pain. He describes symptoms as a stiffness and tightness with intermittent pressure. He noted onset of symptoms in late April with no specific mechanism of injury. He is an active running and had a good spring of training with increasing distances and paces. Initial onset of stiffness occurred the morning following a hilly interval workout. He skipped his typical long run later that weekend and attempted his typical interval run the following Tuesday, but continued to have pressure, stiffness, and tightness through the knee. He was also having increased pain with descending stairs. He had an injection yesterday and notes significant decrease in symptoms and is hesitant about resuming his prior activities. He has also undergone MRI which reveal presence of lateral meniscal tear and arthritic changes. Past Medical History/Comorbidities:   Mr. Jovi Oconnor  has a past medical history of Acute sinusitis; Bilateral impacted cerumen; Cerumen impaction; Chronic sinusitis; Conductive hearing loss; Esophageal reflux; Hearing loss; Hearing loss due to cerumen impaction; and Itching of ear.   Mr. Jovi Oconnor  has a past surgical history that includes hx back surgery and hx other surgical.  Social History/Living Environment:     Patient lives at home with his wife. Prior Level of Function/Work/Activity:  Patient is an active runner and teaches university classes. Dominant Side:         RIGHT  Current Medications:    Current Outpatient Prescriptions:     aspirin delayed-release 81 mg tablet, Take 81 mg by mouth., Disp: , Rfl:     cetirizine (ZYRTEC) 10 mg tablet, 10 mg., Disp: , Rfl:     tadalafil (CIALIS) 5 mg tablet, 5 mg., Disp: , Rfl:     ciclopirox (LOPROX) 0.77 % susp, APPLY TO THE AFFECTED TOENAILS TWICE DAILY, Disp: , Rfl: 11   Date Last Reviewed:  5/22/2018   EXAMINATION:   Observation/Orthostatic Postural Assessment:          No gross deformity or atrophy noted. Symmetric knee alignment. Palpation:          Tenderness through L TFL, ITB, lateral joint line, and lateral patellofemoral joint. ROM:     Hip rotation is symmetric and nonprovocative  Hip extension: 15 ° B  SLR: 60 ° B with increased hamstring tightness. Knee extension: (-)1 ° L with stiff end feel; 0 ° R.  Knee flexion: 130 ° L in supine, 138 ° R; Prone knee flexion: 112 ° L, 124 ° R      Strength:     Knee extension: 4+/5 L; 5/5 R  SLR: 5/5 B  Hip abduction: 4+/5 L; 5/5 R  Hip ER: 4+/5 L; 5/5 R  Hip extension; 4+/5 B      Special Tests:          Not applicable  Neurological Screen:        Grossly intact  Functional Mobility:         Ambulates without assistive device  Balance: WNL with mild deficit in transverse plane control   Body Structures Involved:  1. Nerves  2. Bones  3. Joints  4. Muscles  5. Ligaments Body Functions Affected:  1. Sensory/Pain  2. Neuromusculoskeletal  3. Movement Related Activities and Participation Affected:  1. General Tasks and Demands  2. Mobility  3. Self Care  4. Domestic Life  5. Interpersonal Interactions and Relationships  6.  Community, Social and Civic Life   CLINICAL PRESENTATION:   CLINICAL DECISION MAKING:   Outcome Measure: Tool Used: Visual Analog Scale  Score:  Initial: 5/10 Most Recent: X (Date: -- )   Interpretation of Score: 50% impaired with daily activities involving use of the left LE. Outcome Measure Conversion Site    Medical Necessity:   · Patient is expected to demonstrate progress in strength, range of motion, balance and coordination to increase independence with community mobility and return to prior level of function. Reason for Services/Other Comments:  · Patient has demonstrated an improvement in functional level by independent performance of HEP. TREATMENT:   (In addition to Assessment/Re-Assessment sessions the following treatments were rendered)  THERAPEUTIC EXERCISE: (see below for minutes):  Exercises per grid below to improve mobility, strength, balance and coordination. Required minimal verbal and manual cues to promote proper body alignment, promote proper body posture and promote proper body mechanics. Progressed resistance, range, repetitions and complexity of movement as indicated. MANUAL THERAPY: (see below for minutes): Joint mobilization and Soft tissue mobilization was utilized and necessary because of the patient's restricted joint motion, painful spasm, loss of articular motion and restricted motion of soft tissue.    MODALITIES: (see below for minutes):      to decrease pain     Date: 05/16/18 05/22/18 (visit 2)      Modalities:                                Therapeutic Exercise: 10 min 15 min      Supine knee flexion mobilization Supine with strap, self mobilization [id number]       Lateral band walk With band around toes x3 laps       Clamshell 3x20, no resistance cueing full hip mobility       Bridge With march [id number] Stability ball hamstring curl 8/8/10      Lateral plank [id number]       Squat  With knees at chair 3x8 with manual cueing for RNT      Stretching  Prone quad stretch 3x1 min, supine ITB stretch [id number]                      Proprioceptive Activities: Manual Therapy: 15 min 30 min      Soft tissue mobilization To anterior and lateral thigh, TFL performed STM to quad, ITB, and TFL. Gr 3 to 3++ joint mobs into extension and flexion              Therapeutic Activities:                                        HEP: see 05/16/18 flow sheet for specifics. [facility] Portal  Treatment/Session Assessment:  Added chair squat and stability ball hamstring curl to HEP to progress quad and hamstring strengthening. Also discussed extending NWB aerobic activity. · Pain/ Symptoms: Initial:   3/10 \"I think the swelling is a little better. It still feels really stiff and it feels uncomfortable when I'm twisting, like when I get out of the car. \" Post Session:  No increase following today's treatment session. ·   Compliance with Program/Exercises: Will assess as treatment progresses. · Recommendations/Intent for next treatment session: \"Next visit will focus on advancements to more challenging activities\".   Total Treatment Duration:  PT Patient Time In/Time Out  Time In: 1145  Time Out: 45 Sandra Wagoner, DPT

## 2018-05-25 ENCOUNTER — HOSPITAL ENCOUNTER (OUTPATIENT)
Dept: PHYSICAL THERAPY | Age: 68
Discharge: HOME | End: 2018-05-25
Payer: COMMERCIAL

## 2018-05-25 PROCEDURE — 97110 THERAPEUTIC EXERCISES: CPT

## 2018-05-25 PROCEDURE — 97140 MANUAL THERAPY 1/> REGIONS: CPT

## 2018-05-25 NOTE — PROGRESS NOTES
Greg Marker  : 1950 [address] [facility],[address]  [address] [place] [address].  [phone]   [phone]        OUTPATIENT PHYSICAL THERAPY:Daily Note 2018         ICD-10: Treatment Diagnosis: Pain in left knee (M25.562)  Precautions/Allergies:   Review of patient's allergies indicates no known allergies. Fall Risk Score: 1 (? 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 Marleny Mejía presents to therapy with left knee pain secondary to chondromalacia of the patella and degenerative lateral meniscus tear. He has impairments in joint mobility of the left knee into flexion, extension, and with quadriceps flexibility. He has mild inhibition of quad strength secondary to pain. He has poor frontal and transverse plane stability of the knee that leads to increased loading of the lateral meniscus. This causes difficulty with walking, stair descent, and running tasks. Skilled therapy is required to return to prior level of activity. PROBLEM LIST (Impacting functional limitations):  1. Decreased Strength  2. Decreased ADL/Functional Activities  3. Decreased Ambulation Ability/Technique  4. Decreased Balance  5. Increased Pain  6. Decreased Activity Tolerance  7. Decreased Flexibility/Joint Mobility INTERVENTIONS PLANNED:  1. Balance Exercise  2. Cryotherapy  3. Family Education  4. Gait Training  5. Home Exercise Program (HEP)  6. Manual Therapy  7. Neuromuscular Re-education/Strengthening  8. Range of Motion (ROM)  9. Therapeutic Activites  10. Therapeutic Exercise/Strengthening  11. modalities    TREATMENT PLAN:  Effective Dates: 2018 TO 2018 (90 days). Frequency/Duration: 2 times a week for 90 Days  GOALS: (Goals have been discussed and agreed upon with patient.)  Short-Term Functional Goals: Time Frame: 2 weeks  1. Patient will be independent with HEP. 2. Patient will have no pain with descending stairs. Discharge Goals: Time Frame: 6 weeks  1. Patient will be able to run up to 3 miles without pain to return to prior level of function. 2. Patient will restore prone knee flexion to symmetric with uninvolved to normalize joint mobility and decrease compression forces at the knee. 3. Patient will report 100% functional improvement with formal therapy. Rehabilitation Potential For Stated Goals: Excellent                HISTORY:   History of Present Injury/Illness (Reason for Referral):  Patient presents to therapy with primary complaint of left lateral knee pain. He describes symptoms as a stiffness and tightness with intermittent pressure. He noted onset of symptoms in late April with no specific mechanism of injury. He is an active running and had a good spring of training with increasing distances and paces. Initial onset of stiffness occurred the morning following a hilly interval workout. He skipped his typical long run later that weekend and attempted his typical interval run the following Tuesday, but continued to have pressure, stiffness, and tightness through the knee. He was also having increased pain with descending stairs. He had an injection yesterday and notes significant decrease in symptoms and is hesitant about resuming his prior activities. He has also undergone MRI which reveal presence of lateral meniscal tear and arthritic changes. Past Medical History/Comorbidities:   Mr. Sherron Melo  has a past medical history of Acute sinusitis; Bilateral impacted cerumen; Cerumen impaction; Chronic sinusitis; Conductive hearing loss; Esophageal reflux; Hearing loss; Hearing loss due to cerumen impaction; and Itching of ear.   Mr. Sherron Melo  has a past surgical history that includes hx back surgery and hx other surgical.  Social History/Living Environment:     Patient lives at home with his wife. Prior Level of Function/Work/Activity:  Patient is an active runner and teaches university classes. Dominant Side:         RIGHT  Current Medications:    Current Outpatient Prescriptions:     aspirin delayed-release 81 mg tablet, Take 81 mg by mouth., Disp: , Rfl:     cetirizine (ZYRTEC) 10 mg tablet, 10 mg., Disp: , Rfl:     tadalafil (CIALIS) 5 mg tablet, 5 mg., Disp: , Rfl:     ciclopirox (LOPROX) 0.77 % susp, APPLY TO THE AFFECTED TOENAILS TWICE DAILY, Disp: , Rfl: 11   Date Last Reviewed:  5/25/2018   EXAMINATION:   Observation/Orthostatic Postural Assessment:          No gross deformity or atrophy noted. Symmetric knee alignment. Palpation:          Tenderness through L TFL, ITB, lateral joint line, and lateral patellofemoral joint. ROM:     Hip rotation is symmetric and nonprovocative  Hip extension: 15 ° B  SLR: 60 ° B with increased hamstring tightness. Knee extension: (-)1 ° L with stiff end feel; 0 ° R.  Knee flexion: 130 ° L in supine, 138 ° R; Prone knee flexion: 112 ° L, 124 ° R      Strength:     Knee extension: 4+/5 L; 5/5 R  SLR: 5/5 B  Hip abduction: 4+/5 L; 5/5 R  Hip ER: 4+/5 L; 5/5 R  Hip extension; 4+/5 B      Special Tests:          Not applicable  Neurological Screen:        Grossly intact  Functional Mobility:         Ambulates without assistive device  Balance: WNL with mild deficit in transverse plane control   Body Structures Involved:  1. Nerves  2. Bones  3. Joints  4. Muscles  5. Ligaments Body Functions Affected:  1. Sensory/Pain  2. Neuromusculoskeletal  3. Movement Related Activities and Participation Affected:  1. General Tasks and Demands  2. Mobility  3. Self Care  4. Domestic Life  5. Interpersonal Interactions and Relationships  6.  Community, Social and Civic Life   CLINICAL PRESENTATION:   CLINICAL DECISION MAKING:   Outcome Measure: Tool Used: Visual Analog Scale  Score:  Initial: 5/10 Most Recent: X (Date: -- )   Interpretation of Score: 50% impaired with daily activities involving use of the left LE. Outcome Measure Conversion Site    Medical Necessity:   · Patient is expected to demonstrate progress in strength, range of motion, balance and coordination to increase independence with community mobility and return to prior level of function. Reason for Services/Other Comments:  · Patient has demonstrated an improvement in functional level by independent performance of HEP. TREATMENT:   (In addition to Assessment/Re-Assessment sessions the following treatments were rendered)  THERAPEUTIC EXERCISE: (see below for minutes):  Exercises per grid below to improve mobility, strength, balance and coordination. Required minimal verbal and manual cues to promote proper body alignment, promote proper body posture and promote proper body mechanics. Progressed resistance, range, repetitions and complexity of movement as indicated. MANUAL THERAPY: (see below for minutes): Joint mobilization and Soft tissue mobilization was utilized and necessary because of the patient's restricted joint motion, painful spasm, loss of articular motion and restricted motion of soft tissue.    MODALITIES: (see below for minutes):      to decrease pain     Date: 05/16/18 05/22/18 (visit 2) 05/25/18 (visit 3)     Modalities:                                Therapeutic Exercise: 10 min 15 min 15 min     Supine knee flexion mobilization Supine with strap, self mobilization [id number]  Manual quad stretch, supine knee flexion stretch     Lateral band walk With band around toes x3 laps       Clamshell 3x20, no resistance cueing full hip mobility       Bridge With march [id number] Stability ball hamstring curl 8/8/10      Lateral plank [id number]       Squat  With knees at chair 3x8 with manual cueing for RNT      Stretching  Prone quad stretch 3x1 min, supine ITB stretch [id number]                      Proprioceptive Activities:                                Manual Therapy: 15 min 30 min 30 min     Soft tissue mobilization To anterior and lateral thigh, TFL performed STM to quad, ITB, and TFL. Gr 3 to 3++ joint mobs into extension and flexion Repeat, added STM in R sidelying focusing on lateral hamstring/ITB             Therapeutic Activities:                                        HEP: see 05/16/18 flow sheet for specifics. [facility]  Treatment/Session Assessment:  Demonstrates sufficient decrease in irritability to attempt trial of light running (<20 minutes) over the weekend. Muscle energy improves prone quad flexibility with no increase to patellar irritation. · Pain/ Symptoms: Initial:   2/10 \"The stiffness feels better. It's not as bad going down hill, down stairs, and it's not bad going down stairs. It still feels a little uncomfortable with twisting movements (getting in-out of car), but it's not bad. \" Post Session:  No increase following today's treatment session. ·   Compliance with Program/Exercises: Will assess as treatment progresses. · Recommendations/Intent for next treatment session: \"Next visit will focus on advancements to more challenging activities\".   Total Treatment Duration:  PT Patient Time In/Time Out  Time In: 0845  Time Out: [address], [place]

## 2018-05-29 ENCOUNTER — HOSPITAL ENCOUNTER (OUTPATIENT)
Dept: PHYSICAL THERAPY | Age: 68
Discharge: HOME | End: 2018-05-29
Payer: COMMERCIAL

## 2018-05-29 PROCEDURE — 97140 MANUAL THERAPY 1/> REGIONS: CPT

## 2018-05-29 PROCEDURE — 97110 THERAPEUTIC EXERCISES: CPT

## 2018-05-29 NOTE — PROGRESS NOTES
Karen Delatorre  : 1950 [address] [facility],[address]  [address] [place] [address].  [phone]   [phone]        OUTPATIENT PHYSICAL THERAPY:Daily Note and Discontinuation Summary 2018         ICD-10: Treatment Diagnosis: Pain in left knee (M25.562)  Precautions/Allergies:   Review of patient's allergies indicates no known allergies. Fall Risk Score: 1 (? 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 Rosalino Joya presents to therapy with left knee pain secondary to chondromalacia of the patella and degenerative lateral meniscus tear. He has impairments in joint mobility of the left knee into flexion, extension, and with quadriceps flexibility. He has mild inhibition of quad strength secondary to pain. He has poor frontal and transverse plane stability of the knee that leads to increased loading of the lateral meniscus. This causes difficulty with walking, stair descent, and running tasks. Skilled therapy is required to return to prior level of activity. PROBLEM LIST (Impacting functional limitations):  1. Decreased Strength  2. Decreased ADL/Functional Activities  3. Decreased Ambulation Ability/Technique  4. Decreased Balance  5. Increased Pain  6. Decreased Activity Tolerance  7. Decreased Flexibility/Joint Mobility INTERVENTIONS PLANNED:  1. Balance Exercise  2. Cryotherapy  3. Family Education  4. Gait Training  5. Home Exercise Program (HEP)  6. Manual Therapy  7. Neuromuscular Re-education/Strengthening  8. Range of Motion (ROM)  9. Therapeutic Activites  10. Therapeutic Exercise/Strengthening  11.  modalities    TREATMENT PLAN:  Effective Dates: 5/16/2018 TO 8/14/2018 (90 days). Frequency/Duration: 2 times a week for 90 Days  GOALS: (Goals have been discussed and agreed upon with patient.)  Short-Term Functional Goals: Time Frame: 2 weeks  1. Patient will be independent with HEP. 2. Patient will have no pain with descending stairs. Discharge Goals: Time Frame: 6 weeks  1. Patient will be able to run up to 3 miles without pain to return to prior level of function. 2. Patient will restore prone knee flexion to symmetric with uninvolved to normalize joint mobility and decrease compression forces at the knee. 3. Patient will report 100% functional improvement with formal therapy. Rehabilitation Potential For Stated Goals: Excellent                HISTORY:   History of Present Injury/Illness (Reason for Referral):  Patient presents to therapy with primary complaint of left lateral knee pain. He describes symptoms as a stiffness and tightness with intermittent pressure. He noted onset of symptoms in late April with no specific mechanism of injury. He is an active running and had a good spring of training with increasing distances and paces. Initial onset of stiffness occurred the morning following a hilly interval workout. He skipped his typical long run later that weekend and attempted his typical interval run the following Tuesday, but continued to have pressure, stiffness, and tightness through the knee. He was also having increased pain with descending stairs. He had an injection yesterday and notes significant decrease in symptoms and is hesitant about resuming his prior activities. He has also undergone MRI which reveal presence of lateral meniscal tear and arthritic changes. Past Medical History/Comorbidities:   Mr. Rosa Borja  has a past medical history of Acute sinusitis; Bilateral impacted cerumen; Cerumen impaction; Chronic sinusitis; Conductive hearing loss; Esophageal reflux; Hearing loss;  Hearing loss due to cerumen impaction; and Itching of ear.  Mr. Tyrese Silva  has a past surgical history that includes hx back surgery and hx other surgical.  Social History/Living Environment:     Patient lives at home with his wife. Prior Level of Function/Work/Activity:  Patient is an active runner and teaches university classes. Dominant Side:         RIGHT  Current Medications:    Current Outpatient Prescriptions:     aspirin delayed-release 81 mg tablet, Take 81 mg by mouth., Disp: , Rfl:     cetirizine (ZYRTEC) 10 mg tablet, 10 mg., Disp: , Rfl:     tadalafil (CIALIS) 5 mg tablet, 5 mg., Disp: , Rfl:     ciclopirox (LOPROX) 0.77 % susp, APPLY TO THE AFFECTED TOENAILS TWICE DAILY, Disp: , Rfl: 11   Date Last Reviewed:  5/29/2018   EXAMINATION:   Observation/Orthostatic Postural Assessment:          No gross deformity or atrophy noted. Symmetric knee alignment. Palpation:          Tenderness through L TFL, ITB, lateral joint line, and lateral patellofemoral joint. ROM:     Hip rotation is symmetric and nonprovocative  Hip extension: 15 ° B  SLR: 60 ° B with increased hamstring tightness. Knee extension: (-)1 ° L with stiff end feel; 0 ° R.  Knee flexion: 130 ° L in supine, 138 ° R; Prone knee flexion: 112 ° L, 124 ° R      Strength:     Knee extension: 4+/5 L; 5/5 R  SLR: 5/5 B  Hip abduction: 4+/5 L; 5/5 R  Hip ER: 4+/5 L; 5/5 R  Hip extension; 4+/5 B      Special Tests:          Not applicable  Neurological Screen:        Grossly intact  Functional Mobility:         Ambulates without assistive device  Balance: WNL with mild deficit in transverse plane control   Body Structures Involved:  1. Nerves  2. Bones  3. Joints  4. Muscles  5. Ligaments Body Functions Affected:  1. Sensory/Pain  2. Neuromusculoskeletal  3. Movement Related Activities and Participation Affected:  1. General Tasks and Demands  2. Mobility  3. Self Care  4. Domestic Life  5. Interpersonal Interactions and Relationships  6.  [facility] and [address] [place] [address] PRESENTATION:   CLINICAL DECISION MAKING:   Outcome Measure: Tool Used: Visual Analog Scale  Score:  Initial: 5/10 Most Recent: X (Date: -- )   Interpretation of Score: 50% impaired with daily activities involving use of the left LE. Outcome Measure Conversion Site    Medical Necessity:   · Patient is expected to demonstrate progress in strength, range of motion, balance and coordination to increase independence with community mobility and return to prior level of function. Reason for Services/Other Comments:  · Patient has demonstrated an improvement in functional level by independent performance of HEP. TREATMENT:   (In addition to Assessment/Re-Assessment sessions the following treatments were rendered)  THERAPEUTIC EXERCISE: (see below for minutes):  Exercises per grid below to improve mobility, strength, balance and coordination. Required minimal verbal and manual cues to promote proper body alignment, promote proper body posture and promote proper body mechanics. Progressed resistance, range, repetitions and complexity of movement as indicated. MANUAL THERAPY: (see below for minutes): Joint mobilization and Soft tissue mobilization was utilized and necessary because of the patient's restricted joint motion, painful spasm, loss of articular motion and restricted motion of soft tissue.    MODALITIES: (see below for minutes):      to decrease pain     Date: 05/16/18 05/22/18 (visit 2) 05/25/18 (visit 3) 05/29/18 (visit 4)    Modalities:                                Therapeutic Exercise: 10 min 15 min 15 min 15 min    Supine knee flexion mobilization Supine with strap, self mobilization [id number]  Manual quad stretch, supine knee flexion stretch repeat    Lateral band walk With band around toes x3 laps       Clamshell 3x20, no resistance cueing full hip mobility   Standing firehydrants black tband 3x15 B    Bridge With march [id number] Stability ball hamstring curl 8/8/10      Lateral plank [id number] Squat  With knees at chair 3x8 with manual cueing for RNT      Stretching  Prone quad stretch 3x1 min, supine ITB stretch [id number]                      Proprioceptive Activities:                                Manual Therapy: 15 min 30 min 30 min 30 min    Soft tissue mobilization To anterior and lateral thigh, TFL performed STM to quad, ITB, and TFL. Gr 3 to 3++ joint mobs into extension and flexion Repeat, added STM in R sidelying focusing on lateral hamstring/ITB repeat            Therapeutic Activities:                                        HEP: see 05/16/18 flow sheet for specifics. [facility]  Treatment/Session Assessment:  Advised to continue with gradual return to running progression. Added standing firehydrants to improver transverse plane hip control during stance. 09/04/18 update: As of 05/29/18 appointment patient was travelling out of the [place] for several weeks and would continue with his home exercises. On his return, he was able to run >30 minutes without discomfort and did not want to return to therapy at that time because he was able to successfully self manage his symptoms. · Pain/ Symptoms: Initial:   2/10 \"I was able to run over the weekend for about 20 minutes. It was a little sore after, but it felt about like it did back in April. \" Post Session:  No increase following today's treatment session. ·   Compliance with Program/Exercises: Will assess as treatment progresses. · Recommendations/Intent for next treatment session: \"Next visit will focus on advancements to more challenging activities\".   Total Treatment Duration:  PT Patient Time In/Time Out  Time In: 0845  Time Out: [address], DPT

## 2018-10-12 ENCOUNTER — HOSPITAL ENCOUNTER (OUTPATIENT)
Age: 68
Setting detail: OUTPATIENT SURGERY
Discharge: HOME | End: 2018-10-12
Attending: ORTHOPAEDIC SURGERY | Admitting: ORTHOPAEDIC SURGERY
Payer: COMMERCIAL

## 2018-10-12 ENCOUNTER — ANESTHESIA (OUTPATIENT)
Dept: SURGERY | Age: 68
End: 2018-10-12
Payer: COMMERCIAL

## 2018-10-12 ENCOUNTER — ANESTHESIA EVENT (OUTPATIENT)
Dept: SURGERY | Age: 68
End: 2018-10-12
Payer: COMMERCIAL

## 2018-10-12 VITALS
OXYGEN SATURATION: 99 % | WEIGHT: 155 LBS | SYSTOLIC BLOOD PRESSURE: 114 MMHG | HEART RATE: 51 BPM | DIASTOLIC BLOOD PRESSURE: 69 MMHG | BODY MASS INDEX: 21.62 KG/M2 | RESPIRATION RATE: 16 BRPM | TEMPERATURE: 97.5 F

## 2018-10-12 PROCEDURE — 77030018836 HC SOL IRR NACL ICUM -A: Performed by: ORTHOPAEDIC SURGERY

## 2018-10-12 PROCEDURE — 76060000032 HC ANESTHESIA 0.5 TO 1 HR: Performed by: ORTHOPAEDIC SURGERY

## 2018-10-12 PROCEDURE — 77030038020 HC MANFLD NEPTUNE STRY -B: Performed by: ORTHOPAEDIC SURGERY

## 2018-10-12 PROCEDURE — 74011000250 HC RX REV CODE- 250

## 2018-10-12 PROCEDURE — 74011250636 HC RX REV CODE- 250/636

## 2018-10-12 PROCEDURE — 74011250636 HC RX REV CODE- 250/636: Performed by: ORTHOPAEDIC SURGERY

## 2018-10-12 PROCEDURE — 77030038012 HC WND COBLATN S&N -F: Performed by: ORTHOPAEDIC SURGERY

## 2018-10-12 PROCEDURE — 77030006668 HC BLD SHV MENSCS STRY -B: Performed by: ORTHOPAEDIC SURGERY

## 2018-10-12 PROCEDURE — 74011250636 HC RX REV CODE- 250/636: Performed by: ANESTHESIOLOGY

## 2018-10-12 PROCEDURE — 76210000020 HC REC RM PH II FIRST 0.5 HR: Performed by: ORTHOPAEDIC SURGERY

## 2018-10-12 PROCEDURE — 77030020143 HC AIRWY LARYN INTUB CGAS -A: Performed by: ANESTHESIOLOGY

## 2018-10-12 PROCEDURE — 77030000032 HC CUF TRNQT ZIMM -B: Performed by: ORTHOPAEDIC SURGERY

## 2018-10-12 PROCEDURE — 76210000063 HC OR PH I REC FIRST 0.5 HR: Performed by: ORTHOPAEDIC SURGERY

## 2018-10-12 PROCEDURE — 74011250636 HC RX REV CODE- 250/636: Performed by: PHYSICIAN ASSISTANT

## 2018-10-12 PROCEDURE — 76010000138 HC OR TIME 0.5 TO 1 HR: Performed by: ORTHOPAEDIC SURGERY

## 2018-10-12 PROCEDURE — 74011250637 HC RX REV CODE- 250/637: Performed by: ANESTHESIOLOGY

## 2018-10-12 RX ORDER — LIDOCAINE HYDROCHLORIDE 10 MG/ML
0.1 INJECTION INFILTRATION; PERINEURAL AS NEEDED
Status: DISCONTINUED | OUTPATIENT
Start: 2018-10-12 | End: 2018-10-12 | Stop reason: HOSPADM

## 2018-10-12 RX ORDER — PROPOFOL 10 MG/ML
INJECTION, EMULSION INTRAVENOUS AS NEEDED
Status: DISCONTINUED | OUTPATIENT
Start: 2018-10-12 | End: 2018-10-12 | Stop reason: HOSPADM

## 2018-10-12 RX ORDER — SODIUM CHLORIDE 0.9 % (FLUSH) 0.9 %
5-10 SYRINGE (ML) INJECTION AS NEEDED
Status: DISCONTINUED | OUTPATIENT
Start: 2018-10-12 | End: 2018-10-12 | Stop reason: HOSPADM

## 2018-10-12 RX ORDER — GUAIFENESIN 100 MG/5ML
81 LIQUID (ML) ORAL
Status: COMPLETED | OUTPATIENT
Start: 2018-10-12 | End: 2018-10-12

## 2018-10-12 RX ORDER — ROPIVACAINE HYDROCHLORIDE 5 MG/ML
INJECTION, SOLUTION EPIDURAL; INFILTRATION; PERINEURAL AS NEEDED
Status: DISCONTINUED | OUTPATIENT
Start: 2018-10-12 | End: 2018-10-12 | Stop reason: HOSPADM

## 2018-10-12 RX ORDER — ONDANSETRON 2 MG/ML
INJECTION INTRAMUSCULAR; INTRAVENOUS AS NEEDED
Status: DISCONTINUED | OUTPATIENT
Start: 2018-10-12 | End: 2018-10-12 | Stop reason: HOSPADM

## 2018-10-12 RX ORDER — EPHEDRINE SULFATE 50 MG/ML
INJECTION, SOLUTION INTRAVENOUS AS NEEDED
Status: DISCONTINUED | OUTPATIENT
Start: 2018-10-12 | End: 2018-10-12 | Stop reason: HOSPADM

## 2018-10-12 RX ORDER — ALBUTEROL SULFATE 0.83 MG/ML
2.5 SOLUTION RESPIRATORY (INHALATION) AS NEEDED
Status: DISCONTINUED | OUTPATIENT
Start: 2018-10-12 | End: 2018-10-12 | Stop reason: HOSPADM

## 2018-10-12 RX ORDER — FENTANYL CITRATE 50 UG/ML
INJECTION, SOLUTION INTRAMUSCULAR; INTRAVENOUS AS NEEDED
Status: DISCONTINUED | OUTPATIENT
Start: 2018-10-12 | End: 2018-10-12 | Stop reason: HOSPADM

## 2018-10-12 RX ORDER — ACETAMINOPHEN 500 MG
1000 TABLET ORAL ONCE
Status: COMPLETED | OUTPATIENT
Start: 2018-10-12 | End: 2018-10-12

## 2018-10-12 RX ORDER — HYDROMORPHONE HYDROCHLORIDE 2 MG/ML
0.5 INJECTION, SOLUTION INTRAMUSCULAR; INTRAVENOUS; SUBCUTANEOUS
Status: DISCONTINUED | OUTPATIENT
Start: 2018-10-12 | End: 2018-10-12 | Stop reason: HOSPADM

## 2018-10-12 RX ORDER — SODIUM CHLORIDE 0.9 % (FLUSH) 0.9 %
5-10 SYRINGE (ML) INJECTION EVERY 8 HOURS
Status: DISCONTINUED | OUTPATIENT
Start: 2018-10-12 | End: 2018-10-12 | Stop reason: HOSPADM

## 2018-10-12 RX ORDER — ONDANSETRON 2 MG/ML
4 INJECTION INTRAMUSCULAR; INTRAVENOUS
Status: DISCONTINUED | OUTPATIENT
Start: 2018-10-12 | End: 2018-10-12 | Stop reason: HOSPADM

## 2018-10-12 RX ORDER — CEFAZOLIN SODIUM/WATER 2 G/20 ML
2 SYRINGE (ML) INTRAVENOUS ONCE
Status: COMPLETED | OUTPATIENT
Start: 2018-10-12 | End: 2018-10-12

## 2018-10-12 RX ORDER — MIDAZOLAM HYDROCHLORIDE 1 MG/ML
2 INJECTION, SOLUTION INTRAMUSCULAR; INTRAVENOUS
Status: DISCONTINUED | OUTPATIENT
Start: 2018-10-12 | End: 2018-10-12 | Stop reason: HOSPADM

## 2018-10-12 RX ORDER — SODIUM CHLORIDE, SODIUM LACTATE, POTASSIUM CHLORIDE, CALCIUM CHLORIDE 600; 310; 30; 20 MG/100ML; MG/100ML; MG/100ML; MG/100ML
1000 INJECTION, SOLUTION INTRAVENOUS CONTINUOUS
Status: DISCONTINUED | OUTPATIENT
Start: 2018-10-12 | End: 2018-10-12 | Stop reason: HOSPADM

## 2018-10-12 RX ORDER — LIDOCAINE HYDROCHLORIDE 20 MG/ML
INJECTION, SOLUTION EPIDURAL; INFILTRATION; INTRACAUDAL; PERINEURAL AS NEEDED
Status: DISCONTINUED | OUTPATIENT
Start: 2018-10-12 | End: 2018-10-12 | Stop reason: HOSPADM

## 2018-10-12 RX ORDER — FENTANYL CITRATE 50 UG/ML
50 INJECTION, SOLUTION INTRAMUSCULAR; INTRAVENOUS
Status: DISCONTINUED | OUTPATIENT
Start: 2018-10-12 | End: 2018-10-12 | Stop reason: HOSPADM

## 2018-10-12 RX ORDER — DEXAMETHASONE SODIUM PHOSPHATE 4 MG/ML
INJECTION, SOLUTION INTRA-ARTICULAR; INTRALESIONAL; INTRAMUSCULAR; INTRAVENOUS; SOFT TISSUE AS NEEDED
Status: DISCONTINUED | OUTPATIENT
Start: 2018-10-12 | End: 2018-10-12 | Stop reason: HOSPADM

## 2018-10-12 RX ADMIN — LIDOCAINE HYDROCHLORIDE 100 MG: 20 INJECTION, SOLUTION EPIDURAL; INFILTRATION; INTRACAUDAL; PERINEURAL at 06:59

## 2018-10-12 RX ADMIN — SODIUM CHLORIDE, SODIUM LACTATE, POTASSIUM CHLORIDE, AND CALCIUM CHLORIDE 1000 ML: 600; 310; 30; 20 INJECTION, SOLUTION INTRAVENOUS at 06:22

## 2018-10-12 RX ADMIN — EPHEDRINE SULFATE 6 MG: 50 INJECTION, SOLUTION INTRAVENOUS at 06:59

## 2018-10-12 RX ADMIN — Medication 2 G: at 06:51

## 2018-10-12 RX ADMIN — ONDANSETRON 4 MG: 2 INJECTION INTRAMUSCULAR; INTRAVENOUS at 07:28

## 2018-10-12 RX ADMIN — FENTANYL CITRATE 25 MCG: 50 INJECTION, SOLUTION INTRAMUSCULAR; INTRAVENOUS at 06:59

## 2018-10-12 RX ADMIN — ASPIRIN 81 MG 81 MG: 81 TABLET ORAL at 06:45

## 2018-10-12 RX ADMIN — DEXAMETHASONE SODIUM PHOSPHATE 4 MG: 4 INJECTION, SOLUTION INTRA-ARTICULAR; INTRALESIONAL; INTRAMUSCULAR; INTRAVENOUS; SOFT TISSUE at 07:10

## 2018-10-12 RX ADMIN — ACETAMINOPHEN 1000 MG: 500 TABLET, FILM COATED ORAL at 06:21

## 2018-10-12 RX ADMIN — PROPOFOL 200 MG: 10 INJECTION, EMULSION INTRAVENOUS at 06:59

## 2018-10-12 NOTE — IP AVS SNAPSHOT
[address] 
[phone] Patient: Lina Pichardo MRN: [id number] :1950 About your hospitalization You were admitted on:  2018 You last received care in the:  [facility] OP PACU You were discharged on:  2018 Why you were hospitalized Your primary diagnosis was:  Not on File Follow-up Information Follow up With Details Comments Contact Info Gumaro Suggs, [address] [place] [address] 
[phone] MD Dr. Dav Bishop office will call to schedule your follow up appointment. [address] [address] 
[phone] Your Scheduled Appointments Monday October 15, 2018  4:15 PM EDT  
PT Aileen Dyer Initial Ortho with Margareth Soares, DPT  
[facility] ([address]) [address] [address] [phone] Please arrive 10-15 minutes prior to your appointment time. Wear comfortable clothing. Please bring your insurance cards with you. Please bring a list of your medications including herbal supplements. Please bring a list of your allergies including food allergies. [facility] [place], [place], [address] 2018  2:00 PM EDT  
PT Aileen Dyer Recurring Ortho with Margareth Soares, DPT  
[facility] ([address]) [address] [address] [phone] [facility] [place], [place], [address]   2:45 PM EDT  
PT Aileen Dyer Recurring Ortho with Margarethrakel Soares, DPT  
[facility] ([address]) [address] [address] [phone] [facility] [place], [place], [address]  2018  3:30 PM EDT  
 PT Priscilla Binning Recurring Ortho with Murtis Both, DPT  
[facility] ([address]) [address] [address] [phone] [facility] [place], [place], [address] Wednesday October 24, 2018  8:00 AM EDT  
PT Priscilla Binning Recurring Ortho with Murtis Both, DPT  
[facility] ([address]) [address] [address] [phone] [facility] [place], [place], [address] Thursday October 25, 2018  2:45 PM EDT  
PT Priscilla Binning Recurring Ortho with Murtis Both, DPT  
[facility] ([address]) [address] [address] [phone] [facility] [place], [place], [address] Monday October 29, 2018  2:45 PM EDT  
PT Priscilla Binning Recurring Ortho with Murtis Both, DPT  
[facility] ([address]) [address] [id number] [phone] [facility] [place], [place], [address] Wednesday October 31, 2018  2:45 PM EDT  
PT Priscilla Binning Recurring Ortho with Murtis Both, DPT  
[facility] ([address]) [address] [id number] [phone] [facility] [place], [place], [address] Thursday November 01, 2018  2:45 PM EDT  
PT Priscilla Binning Recurring Ortho with Murtis Both, DPT  
[facility] ([address]) [address] [address] [phone] [facility] [place], [place], [address] Thursday December 06, 2018  2:45 PM EST Office Visit with MD Shandra Malik 11 ENT 8001 [facility]) [address]  
[phone] Discharge Orders None A check dinesh indicates which time of day the medication should be taken. My Medications CONTINUE taking these medications Instructions Each Dose to Equal  
 Morning Noon Evening Bedtime  
 aspirin delayed-release 81 mg tablet Your last dose was: Your next dose is: Take 81 mg by mouth daily. Indications: prevention 81 mg  
    
   
   
   
  
 ZyrTEC 10 mg tablet Generic drug:  cetirizine Your last dose was: Your next dose is: Take 10 mg by mouth every other day. 10 mg Discharge Instructions Post-Operative Instructions For 
Knee Arthroscopy Phone:  [phone] 1. Unless otherwise instructed, you may place as much weight on your leg as you wish. 2. If you do not have an \"Iceman\" type cooling unit, for the first 48-72 hours following surgery, use ice on the knee every two hours (while awake) for 20-30 minutes at a time to help prevent swelling and lessen pain. If you have a cooling unit, follow the instructions given to you- continually as much as possible the first 48-72 hours, then 3-4 times a day for 4 weeks. Elevate leg. 3. Perform at least 30 to 50 leg-raising exercises twice a day. Begin exercise as soon as pain allows. Also perform gentle active motion of the knee as soon as pain allows. 4. On the third day after surgery, remove the dressing. Leave steri-strips on, they eventually will peel off.   
 
5. Use any pain medication as instructed. You should take your pain medication as soon as you feel the anesthetic wearing off. Do not wait until you are in severe pain to begin taking your pain medication. 6. You may have some side effects from your pain medication. If you have nausea, try taking your medication with food. For itching, you may take over the counter Benadryl. 7. You may shower as soon as desired.   The first three days after surgery, wrap the dressings in saran wrap to keep them dry when showering. 8. You may have been given a prescription for Zofran or Phenergan. This medication is used for nausea and vomiting. You do not need to get this prescription filled unless you have a problem. 9. If you have a problem, please call [address] [place] [address] at [phone] [facility] ACTIVITY · As tolerated and as directed by your doctor. · Bathe or shower as directed by your doctor. DIET · Clear liquids until no nausea or vomiting; then light diet for the first day. · Advance to regular diet on second day, unless your doctor orders otherwise. · If nausea and vomiting continues, call your doctor. PAIN 
· Take pain medication as directed by your doctor. · Call your doctor if pain is NOT relieved by medication. · DO NOT take aspirin of blood thinners unless directed by your doctor. DRESSING CARE  
 
 
CALL YOUR DOCTOR IF  
· Excessive bleeding that does not stop after holding pressure over the area · Temperature of 101 degrees F or above · Excessive redness, swelling or bruising, and/ or green or yellow, smelly discharge from incision AFTER ANESTHESIA · For the first 24 hours: DO NOT Drive, Drink alcoholic beverages, or Make important decisions. · Be aware of dizziness following anesthesia and while taking pain medication. APPOINTMENT DATE/ TIME 
 
YOUR DOCTOR'S PHONE NUMBER  
 
 
DISCHARGE SUMMARY from Nurse PATIENT INSTRUCTIONS: 
 
After general anesthesia or intravenous sedation, for 24 hours or while taking prescription Narcotics: · Limit your activities · Do not drive and operate hazardous machinery · Do not make important personal or business decisions · Do  not drink alcoholic beverages · If you have not urinated within 8 hours after discharge, please contact your surgeon on call. *  Please give a list of your current medications to your Primary Care Provider. *  Please update this list whenever your medications are discontinued, doses are 
    changed, or new medications (including over-the-counter products) are added. *  Please carry medication information at all times in case of emergency situations. These are general instructions for a healthy lifestyle: No smoking/ No tobacco products/ Avoid exposure to second hand smoke Surgeon General's Warning:  Quitting smoking now greatly reduces serious risk to your health. Obesity, smoking, and sedentary lifestyle greatly increases your risk for illness A healthy diet, regular physical exercise & weight monitoring are important for maintaining a healthy lifestyle You may be retaining fluid if you have a history of heart failure or if you experience any of the following symptoms:  Weight gain of 3 pounds or more overnight or 5 pounds in a week, increased swelling in our hands or feet or shortness of breath while lying flat in bed. Please call your doctor as soon as you notice any of these symptoms; do not wait until your next office visit. Recognize signs and symptoms of STROKE: 
 
F-face looks uneven A-arms unable to move or move unevenly S-speech slurred or non-existent T-time-call 911 as soon as signs and symptoms begin-DO NOT go Back to bed or wait to see if you get better-TIME IS BRAIN. Introducing [facility]! Dear Cris Granda: 
Thank you for requesting a [employer] account. Our records indicate that you already have an active [employer] account. You can access your account anytime at [email or web address] Did you know that you can access your hospital and ER discharge instructions at any time in [employer]? You can also review all of your test results from your hospital stay or ER visit. Additional Information If you have questions, please visit the Frequently Asked Questions section of the MyChart website at [email or web address] Remember, [employer] is NOT to be used for urgent needs. For medical emergencies, dial 911. Now available from your iPhone and Android! Introducing Judah Wright As a [employer] [employer] [facility] patient, I wanted to make you aware of our electronic visit tool called Judah Wright. [employer] allows you to connect within minutes with a medical provider 24 hours a day, seven days a week via a mobile device or tablet or logging into a secure website from your computer. You can access Judah Wright from anywhere in the [place]. A virtual visit might be right for you when you have a simple condition and feel like you just dont want to get out of bed, or cant get away from work for an appointment, when your regular [facility] provider is not available (evenings, weekends or holidays), or when youre out of town and need minor care. Electronic visits cost only $49 and if the [employer] provider determines a prescription is needed to treat your condition, one can be electronically transmitted to a nearby pharmacy*. Please take a moment to enroll today if you have not already done so. The enrollment process is free and takes just a few minutes. To enroll, please download the ePrep christina to your tablet or phone, or visit [email or web address] to enroll on your computer. And, as an [address] [place] [address] patient with a [employer] account, the results of your visits will be scanned into your electronic medical record and your primary care provider will be able to view the scanned results. We urge you to continue to see your regular [facility] provider for your ongoing medical care.   And while your primary care provider may not be the one available when you seek a Judah Wright virtual visit, the peace of mind you get from getting a real diagnosis real time can be priceless. For more information on Judah Wright, view our Frequently Asked Questions (FAQs) at [email or web address]. Sincerely, 
 
Milvia Ambrosio MD 
Chief Medical Officer [address] [place] *:  certain medications cannot be prescribed via Judah Wright Providers Seen During Your Hospitalization Provider Specialty Primary office phone Jose Juan Banegas MD Orthopedic Surgery [phone] Your Primary Care Physician (PCP) Primary Care Physician Office Phone Office Fax Dex Ornelas [phone] You are allergic to the following No active allergies Recent Documentation Weight BMI Smoking Status 70.3 kg 21.62 kg/m2 Never Smoker Emergency Contacts Name Discharge Info Relation Home Work Mobile Dinora Chandra  Spouse [3] [phone] Patient Belongings The following personal items are in your possession at time of discharge: 
  Dental Appliances: None         Home Medications: None   Jewelry: None  Clothing: Footwear, Pants, Shirt    Other Valuables: None Please provide this summary of care documentation to your next provider. Signatures-by signing, you are acknowledging that this After Visit Summary has been reviewed with you and you have received a copy. Patient Signature:  ____________________________________________________________ Date:  ____________________________________________________________  
  
[facility] Provider Signature:  ____________________________________________________________ Date:  ____________________________________________________________

## 2018-10-12 NOTE — ANESTHESIA PREPROCEDURE EVALUATION
Anesthetic History No history of anesthetic complications Review of Systems / Medical History Patient summary reviewed and pertinent labs reviewed Pulmonary Within defined limits Neuro/Psych Within defined limits Cardiovascular Exercise tolerance: >4 METS Comments: Marathon runner. GI/Hepatic/Renal 
Within defined limits Endo/Other Arthritis Other Findings Physical Exam 
 
Airway Mallampati: II 
TM Distance: 4 - 6 cm Neck ROM: normal range of motion Mouth opening: Normal 
 
 Cardiovascular Rhythm: regular Rate: normal 
 
 
Pertinent negatives: No murmur Dental 
No notable dental hx Pulmonary Breath sounds clear to auscultation Abdominal 
 
 
 
 Other Findings Anesthetic Plan ASA: 2 Anesthesia type: general 
 
 
 
 
Induction: Intravenous Anesthetic plan and risks discussed with: Patient LMA general

## 2018-10-12 NOTE — DISCHARGE INSTRUCTIONS
Post-Operative Instructions   For  Knee Arthroscopy  Phone:  [phone]    1. Unless otherwise instructed, you may place as much weight on your leg as you wish. 2. If you do not have an \"Iceman\" type cooling unit, for the first 48-72 hours following surgery, use ice on the knee every two hours (while awake) for 20-30 minutes at a time to help prevent swelling and lessen pain. If you have a cooling unit, follow the instructions given to you- continually as much as possible the first 48-72 hours, then 3-4 times a day for 4 weeks. Elevate leg. 3. Perform at least 30 to 50 leg-raising exercises twice a day. Begin exercise as soon as pain allows. Also perform gentle active motion of the knee as soon as pain allows. 4. On the third day after surgery, remove the dressing. Leave steri-strips on, they eventually will peel off.      5. Use any pain medication as instructed. You should take your pain medication as soon as you feel the anesthetic wearing off. Do not wait until you are in severe pain to begin taking your pain medication. 6. You may have some side effects from your pain medication. If you have nausea, try taking your medication with food. For itching, you may take over the counter Benadryl. 7. You may shower as soon as desired. The first three days after surgery, wrap the dressings in saran wrap to keep them dry when showering. 8. You may have been given a prescription for Zofran or Phenergan. This medication is used for nausea and vomiting. You do not need to get this prescription filled unless you have a problem. 9. If you have a problem, please call [address] [place] [address] at [phone]    [employer] ACTIVITY  · As tolerated and as directed by your doctor. · Bathe or shower as directed by your doctor. DIET  · Clear liquids until no nausea or vomiting; then light diet for the first day.   · Advance to regular diet on second day, unless your doctor orders otherwise. · If nausea and vomiting continues, call your doctor. PAIN  · Take pain medication as directed by your doctor. · Call your doctor if pain is NOT relieved by medication. · DO NOT take aspirin of blood thinners unless directed by your doctor. DRESSING CARE       CALL YOUR DOCTOR IF   · Excessive bleeding that does not stop after holding pressure over the area  · Temperature of 101 degrees F or above  · Excessive redness, swelling or bruising, and/ or green or yellow, smelly discharge from incision    AFTER ANESTHESIA   · For the first 24 hours: DO NOT Drive, Drink alcoholic beverages, or Make important decisions. · Be aware of dizziness following anesthesia and while taking pain medication. APPOINTMENT DATE/ TIME    YOUR DOCTOR'S PHONE NUMBER       DISCHARGE SUMMARY from Nurse    PATIENT INSTRUCTIONS:    After general anesthesia or intravenous sedation, for 24 hours or while taking prescription Narcotics:  · Limit your activities  · Do not drive and operate hazardous machinery  · Do not make important personal or business decisions  · Do  not drink alcoholic beverages  · If you have not urinated within 8 hours after discharge, please contact your surgeon on call. *  Please give a list of your current medications to your Primary Care Provider. *  Please update this list whenever your medications are discontinued, doses are      changed, or new medications (including over-the-counter products) are added. *  Please carry medication information at all times in case of emergency situations. These are general instructions for a healthy lifestyle:    No smoking/ No tobacco products/ Avoid exposure to second hand smoke    Surgeon General's Warning:  Quitting smoking now greatly reduces serious risk to your health.     Obesity, smoking, and sedentary lifestyle greatly increases your risk for illness    A healthy diet, regular physical exercise & weight monitoring are important for maintaining a healthy lifestyle    You may be retaining fluid if you have a history of heart failure or if you experience any of the following symptoms:  Weight gain of 3 pounds or more overnight or 5 pounds in a week, increased swelling in our hands or feet or shortness of breath while lying flat in bed. Please call your doctor as soon as you notice any of these symptoms; do not wait until your next office visit. Recognize signs and symptoms of STROKE:    F-face looks uneven    A-arms unable to move or move unevenly    S-speech slurred or non-existent    T-time-call 911 as soon as signs and symptoms begin-DO NOT go       Back to bed or wait to see if you get better-TIME IS BRAIN.

## 2018-10-12 NOTE — OP NOTES
[facility]  OPERATIVE REPORT    Dheeraj Rangel  MR#: [id number]  : 1950  ACCOUNT #: [de-identified]   DATE OF SERVICE: 10/12/2018    PREOPERATIVE DIAGNOSES:  1. Chondromalacia patella -- patellofemoral compartment. 2.  Lateral meniscal tear and chondromalacia of lateral femoral condyle -- lateral compartment. 3.  Medial meniscal tear -- medial compartment, left knee. POSTOPERATIVE DIAGNOSES:    1. Chondromalacia patella -- patellofemoral compartment. 2.  Lateral meniscal tear and chondromalacia of lateral femoral condyle -- lateral compartment. 3.  Medial meniscal tear -- medial compartment, left knee. PROCEDURES PERFORMED:  1. Abrasion arthroplasty patella -- patellofemoral compartment. 2.  Partial lateral meniscectomy and chondroplasty of lateral femoral condyle -- lateral compartment. 3.  Partial medial meniscectomy -- medial compartment, left knee. SURGEON:  Manuela Camargo MD    ASSISTANT:  KHADIJAH Hough    ANESTHESIA:  General.    FLUIDS:  Crystalloid. ESTIMATED BLOOD LOSS:  Minimal.    SPECIMENS REMOVED:  None. COMPLICATIONS:  None. IMPLANTS:  None. FINDINGS:  There was tearing of the posterior horn and body of the medial meniscus. There was tearing of the body of the lateral meniscus. There was grade II-III chondromalacia of the lateral femoral condyle 2 x 2 cm with large loose flaps. There was grade II/III/IV chondromalacia with a small area of exposed bone of the patella 2 x 2 cm. DESCRIPTION OF PROCEDURE:  After informed consent, the patient was taken to the operating room and placed on the table in supine position. General endotracheal anesthesia was administered without difficulty. Tourniquet was applied to left upper thigh. Left knee and leg were prepped and draped in sterile fashion. Tourniquet was inflated. Standard inferomedial and inferolateral portals were made for scope and instruments.   Knee was then arthroscoped in a sequential manner. The aforementioned findings were noted. Attention directed to the patellofemoral compartment. A chondroplasty of the patella was performed. All loose cartilage flaps were removed. There were no sharp edges or unstable fragments after the chondroplasty. There was an area of exposed bone with a contained defect. An abrasion arthroplasty was performed down to bleeding subchondral bone without difficulty. Attention directed to the lateral compartment of the knee. A partial lateral meniscectomy was performed. All the torn portion was removed. At the termination of the partial lateral meniscectomy, remaining meniscal rim had a smooth contour. There were no sharp edges or unstable fragments. A chondroplasty of the lateral femoral condyle was performed back to a smooth stable rim. Attention directed to the medial compartment of the knee. A partial medial meniscectomy was performed. All the torn portion was removed. At the termination of the partial medial meniscectomy, remaining meniscal rim had a smooth contour. There were no sharp edges or unstable fragments. The knee was thoroughly irrigated. Portals were closed. Sterile dressings were applied. Patient was extubated and taken to recovery room in stable condition. KHADIJAH Barron, assisted during the procedure. He was necessary for patient positioning during the procedure and assistance with the major portions of the operation. His presence decreased the operative time and potential complication rate.       MD CHERYL Charlton / Chiqui Montaño  D: 10/12/2018 08:20     T: 10/12/2018 08:33  JOB #: [id number]

## 2018-10-12 NOTE — H&P
Outpatient Surgery History and Physical: 
Madan Isaacs was seen and examined. CHIEF COMPLAINT:   Left knee pain. PE: 
  
Visit Vitals  /71 (BP 1 Location: Right arm, BP Patient Position: Sitting)  Pulse (!) 56  Temp 97.8 °F (36.6 °C)  Resp 18  Wt 70.3 kg (155 lb)  SpO2 100%  BMI 21.62 kg/m2 Heart:   Regular rhythm Lungs:  Are clear Past Medical History:   
Patient Active Problem List  
 Diagnosis  Hearing loss due to cerumen impaction  Conductive hearing loss Surgical History:  
Past Surgical History:  
Procedure Laterality Date  HX COLONOSCOPY    
 HX LUMBAR LAMINECTOMY  HX TONSILLECTOMY  HX WISDOM TEETH EXTRACTION Social History: Patient  reports that he has never smoked. He has never used smokeless tobacco. He reports that he drinks alcohol. He reports that he does not use illicit drugs. Family History:  
Family History Problem Relation Age of Onset  Other Mother   
  brain cancer  Colon Cancer Father  Heart Attack Father  Heart Disease Other  Cancer Other  Other Other   
  brain tumor Allergies: Reviewed per EMR No Known Allergies Medications: No current facility-administered medications on file prior to encounter. Current Outpatient Prescriptions on File Prior to Encounter Medication Sig  cetirizine (ZYRTEC) 10 mg tablet Take 10 mg by mouth every other day.  aspirin delayed-release 81 mg tablet Take 81 mg by mouth daily. Indications: prevention The surgery is planned for the left knee- left knee arthroscopy. History and physical has been reviewed. The patient has been examined. There have been no significant clinical changes since the completion of the originally dated History and Physical. 
Patient identified by surgeon; surgical site was confirmed by patient and surgeon. The patient is here today for outpatient surgery.  I have examined the patient, no changes are noted in the patient's medical status. Necessity for the procedure/care is still present and the history and physical above is current. See the office notes for the full long term history of the problem. Please see the recent office notes for the musculoskeletal examination. Signed By: KHADIJAH Patle  October 12, 2018 6:40 AM

## 2018-10-12 NOTE — ANESTHESIA POSTPROCEDURE EVALUATION
Post-Anesthesia Evaluation and Assessment Patient: [facility] MRN: [id number]  SSN: [id number] YOB: 1950  Age: 75 y.o. Sex: male Cardiovascular Function/Vital Signs Visit Vitals  /69  Pulse (!) 51  Temp 36.4 °C (97.5 °F)  Resp 16  Wt 70.3 kg (155 lb)  SpO2 99%  BMI 21.62 kg/m2 Patient is status post general anesthesia for Procedure(s): LEFT KNEE ARTHROSCOPY/  MEDIAL AND LATERAL MENISCECTOMY. Nausea/Vomiting: None Postoperative hydration reviewed and adequate. Pain: 
Pain Scale 1: Numeric (0 - 10) (10/12/18 0755) Pain Intensity 1: 0 (10/12/18 0755) Managed Neurological Status:  
Neuro (WDL): Exceptions to WDL (10/12/18 0755) Neuro Neurologic State: Alert (10/12/18 0755) LUE Motor Response: Purposeful (10/12/18 0755) LLE Motor Response: Numbness (10/12/18 0755) RUE Motor Response: Purposeful (10/12/18 0755) RLE Motor Response: Purposeful (10/12/18 0755) At baseline Mental Status and Level of Consciousness: Arousable Pulmonary Status:  
O2 Device: Room air (10/12/18 0755) Adequate oxygenation and airway patent Complications related to anesthesia: None Post-anesthesia assessment completed. No concerns Signed By: Joon Swain MD   
 October 12, 2018

## 2018-10-17 ENCOUNTER — HOSPITAL ENCOUNTER (OUTPATIENT)
Dept: PHYSICAL THERAPY | Age: 68
Discharge: HOME | End: 2018-10-17
Payer: MEDICARE

## 2018-10-17 PROCEDURE — 97110 THERAPEUTIC EXERCISES: CPT

## 2018-10-17 PROCEDURE — 97016 VASOPNEUMATIC DEVICE THERAPY: CPT

## 2018-10-17 NOTE — PROGRESS NOTES
Santhosh Kee  : 1950 [id number] [facility]  [address] [place] [address].  [phone]   [phone]        OUTPATIENT PHYSICAL THERAPY:Daily Note 10/17/2018         ICD-10: Treatment Diagnosis: Pain in left knee (M25.562); Difficulty in walking, not elsewhere classified (R26.2)  Precautions/Allergies:   Patient has no known allergies. Fall Risk Score: 1 (? 5 = High Risk)  MD Orders: evaluate and treat MEDICAL/REFERRING DIAGNOSIS:  Unilateral primary osteoarthritis, left knee [M17.12]  Chondromalacia patellae, left knee [M22.42]  Other tear of lateral meniscus, current injury, left knee, initial encounter [C38.951D]   DATE OF ONSET: date of surgery: 10/12/18 s/p left knee arthroscopy (please refer to surgical notes for specifics. REFERRING PHYSICIAN: Ivone Lucero MD  RETURN PHYSICIAN APPOINTMENT: 10/29/18     INITIAL ASSESSMENT:  Mr. Joey Perez presents to therapy with decreased joint mobility at the knee into extension and flexion; restricted flexibility of the quadriceps, hamstrings and calf musculature, decreased strength throughout the lower extremity secondary to neuromuscular inhibition, atrophy, and pain. This causes difficulty with weight bearing activities such as walking, getting up from a low chair, going up/down stairs, and recreational activities such as running and playing pickle ball. Skilled therapy is required to return to prior level of function. PROBLEM LIST (Impacting functional limitations):  1. Decreased Strength  2. Decreased ADL/Functional Activities  3. Decreased Ambulation Ability/Technique  4. Decreased Balance  5. Increased Pain  6. Decreased Activity Tolerance  7. Decreased Flexibility/Joint Mobility INTERVENTIONS PLANNED:  1. Balance Exercise  2. Family Education  3. Gait Training  4. Home Exercise Program (HEP)  5. Manual Therapy  6. Neuromuscular Re-education/Strengthening  7.  Range of Motion (ROM)  8. Therapeutic Activites  9. Therapeutic Exercise/Strengthening  10. modalities    TREATMENT PLAN:  Effective Dates: 10/15/2018 TO 1/14/2019 (90 days). Frequency/Duration: 3 times a week for 90 Days  GOALS: (Goals have been discussed and agreed upon with patient.)  Short-Term Functional Goals: Time Frame: 2 weeks  1. Patient will be independent with HEP. 2. Patient will improve active knee extension to 0 ° during stance to restore symmetric joint loading during stance phase of gait. 3. Patient will report pain <2/10 with daily activity. Discharge Goals: Time Frame: 6 weeks  1. Patient will improve knee mobility to 2-0-135 ° to normalize joint mobility for symmetric gait on all surfaces and at all speeds. 2. Patient will report no pain with progression to independent gym routine to return to prior level of function. 3. Patient will improve knee extension strength to 5/5 to restore dynamic stability required for stability during gait at all speeds. Rehabilitation Potential For Stated Goals: Excellent                HISTORY:   History of Present Injury/Illness (Reason for Referral):  Patient presents to therapy with primary complaint of left knee pain, stiffness, and swelling following above surgical procedure. He reports a 2 year history of intermittent knee pain and stiffness associated with running and activities such as going down stairs that was previously managed with conservative methods. He had been able to return to a limited running regimen but over the summer was beginning to have increased stiffness, increased discomfort for longer periods into his run, and increased pain on the days following his run. He was also having more sensitivity with general daily walking activities. He notes no complications with surgery and has only had to take Aleeve on the afternoon following surgery and the morning after surgery. Otherwise he has been icing to manage his discomfort.  He does report some stiffness and soreness this morning that he attributes to returning to work at his standing desk and doing a little bit of walking over the weekend. He is an active individual and would like to return to activities such as running, playing pickleball and hiking. Past Medical History/Comorbidities:   Mr. Ector Forbes  has a past medical history of Acute sinusitis, Arthritis, Bilateral impacted cerumen, Chronic sinusitis, Conductive hearing loss, Esophageal reflux, Hearing loss, Hearing loss due to cerumen impaction, Itching of ear, and Left knee pain. Mr. Ector Forbes  has a past surgical history that includes hx lumbar laminectomy; hx tonsillectomy; hx wisdom teeth extraction; hx colonoscopy; and LEFT KNEE ARTHROSCOPY/  MEDIAL AND LATERAL MENISCECTOMY (Left, 10/12/2018). Social History/Living Environment:     Patient lives at home with his wife. Prior Level of Function/Work/Activity:  Patient is a . He enjoys running, playing pickleball and hiking. Dominant Side:         RIGHT  Current Medications:    Current Outpatient Medications:     aspirin delayed-release 81 mg tablet, Take 81 mg by mouth daily. Indications: prevention, Disp: , Rfl:     cetirizine (ZYRTEC) 10 mg tablet, Take 10 mg by mouth every other day., Disp: , Rfl:    Date Last Reviewed:  10/17/2018   EXAMINATION:   Observation/Orthostatic Postural Assessment:          Incision sites are closed and covered with steri strips. There appears to be some blistering at the superior portion of the lateral incision. Girth: Calf: 37.5cm L, 38cm R; Mid patella: 40cm L, 38cm R; Mid thigh: 48cm L, 49cm R  Palpation:          Mild tenderness along incision sites.   ROM:     Left knee: 2-120 ° active; 0 to 125 ° passive  Right knee: 1-0-135 °   SLR: 60 ° L; 70 ° R  Hip extension: WNL      Strength:     Quad set is fair  SLR performed with 5 ° extension lag  Knee extension: 4-/5 L; 5/5 R  SLR: 4/5 L; 5/5 R  Hip abduction: 4/5 L; 5/5 R      Special Tests: Not applicable  Neurological Screen:        Light touch sensation is fully intact. Functional Mobility:         Ambulates without assistive device. Ambulates with slight increase in knee flexion during stance phase of gait. Balance:          Maintains single leg stance with eyes open for 5 seconds. Body Structures Involved:  1. Nerves  2. Bones  3. Joints  4. Muscles  5. Ligaments Body Functions Affected:  1. Sensory/Pain  2. Neuromusculoskeletal  3. Movement Related Activities and Participation Affected:  1. Learning and Applying Knowledge  2. General Tasks and Demands  3. Mobility  4. Self Care  5. Domestic Life  6. Interpersonal Interactions and Relationships  7. Community, Social and Civic Life   CLINICAL PRESENTATION:   CLINICAL DECISION MAKING:   Outcome Measure: Tool Used: Lower Extremity Functional Scale (LEFS)  Score:  Initial: 47/80 Most Recent: X/80 (Date: -- )   Interpretation of Score: 20 questions each scored on a 5 point scale with 0 representing \"extreme difficulty or unable to perform\" and 4 representing \"no difficulty\". The lower the score, the greater the functional disability. 80/80 represents no disability. Minimal detectable change is 9 points. Score 80 79-63 62-48 47-32 31-16 15-1 0   Modifier CH CI CJ CK CL CM CN     ? Mobility - Walking and Moving Around:     - CURRENT STATUS: CK - 40%-59% impaired, limited or restricted    - GOAL STATUS: CI - 1%-19% impaired, limited or restricted    - D/C STATUS:  ---------------To be determined---------------    Medical Necessity:   · Patient is expected to demonstrate progress in strength, range of motion, balance and coordination to increase independence with community mobility and return to prior level of function. Reason for Services/Other Comments:  · Patient has demonstrated an improvement in functional level by independent performance of HEP.    TREATMENT:   (In addition to Assessment/Re-Assessment sessions the following treatments were rendered)  THERAPEUTIC EXERCISE: (see below for minutes):  Exercises per grid below to improve mobility, strength, balance and coordination. Required minimal verbal and manual cues to promote proper body alignment, promote proper body posture and promote proper body mechanics. Progressed resistance, range, repetitions and complexity of movement as indicated. MANUAL THERAPY: (see below for minutes): Joint mobilization and Soft tissue mobilization was utilized and necessary because of the patient's restricted joint motion, painful spasm, loss of articular motion and restricted motion of soft tissue. MODALITIES: (see below for minutes): To decrease pain     Date: 10/15/18 10/17/18 (visit 2)      Modalities:  15 min        gameready x 15 min (vasopneumatic) to assist with edema/swelling management                      Therapeutic Exercise: 15 min 45 min      Bike 5 min 5 min      Heel slides [id number] repeat      Quad set [id number] with heel elevated       SLR 3x10 With NMES x 10 min 10sec on/off queing quad set      Hip abduction 3x10 Clamshell 3x15      Stretching Instructed with calf and hamstring stretching for home Manual stretching into knee extension gr 3 3x30      FTKE  40x black tband      Walking march  x2 laps      Bridge  With heels on stability ball 3x10      Lateral band walks  Black x 3 laps                                      Proprioceptive Activities:                                Manual Therapy: 10 min       Mobilizations Gr 3 patellar glides, mobs into extension               Therapeutic Activities:                                        HEP: see 10/15/18 flow sheet for specifics  [facility]  Treatment/Session Assessment:  Progressed with open chain activities about without joint irritation. · Pain/ Symptoms: Initial:   3/10 Was able to spend time on the bike the past few days with improving fluidity of movement.   Post Session:  No increase following today's treatment session. ·   Compliance with Program/Exercises: Will assess as treatment progresses. · Recommendations/Intent for next treatment session: \"Next visit will focus on advancements to more challenging activities\".   Total Treatment Duration:  PT Patient Time In/Time Out  Time In: 1400  Time Out: [address], DPT

## 2018-10-18 ENCOUNTER — HOSPITAL ENCOUNTER (OUTPATIENT)
Dept: PHYSICAL THERAPY | Age: 68
Discharge: HOME | End: 2018-10-18
Payer: MEDICARE

## 2018-10-18 PROCEDURE — 97110 THERAPEUTIC EXERCISES: CPT

## 2018-10-18 PROCEDURE — 97140 MANUAL THERAPY 1/> REGIONS: CPT

## 2018-10-18 PROCEDURE — 97016 VASOPNEUMATIC DEVICE THERAPY: CPT

## 2018-10-18 NOTE — PROGRESS NOTES
Boy Tasha  : 1950 [address]  [address] [place] [address].  [phone]   [phone]        OUTPATIENT PHYSICAL THERAPY:Daily Note 10/18/2018         ICD-10: Treatment Diagnosis: Pain in left knee (M25.562); Difficulty in walking, not elsewhere classified (R26.2)  Precautions/Allergies:   Patient has no known allergies. Fall Risk Score: 1 (? 5 = High Risk)  MD Orders: evaluate and treat MEDICAL/REFERRING DIAGNOSIS:  Unilateral primary osteoarthritis, left knee [M17.12]  Chondromalacia patellae, left knee [M22.42]  Other tear of lateral meniscus, current injury, left knee, initial encounter [W27.629Q]   DATE OF ONSET: date of surgery: 10/12/18 s/p left knee arthroscopy (please refer to surgical notes for specifics. REFERRING PHYSICIAN: Luiz Wiggins MD  RETURN PHYSICIAN APPOINTMENT: 10/29/18     INITIAL ASSESSMENT:  Mr. Marisela Fraire presents to therapy with decreased joint mobility at the knee into extension and flexion; restricted flexibility of the quadriceps, hamstrings and calf musculature, decreased strength throughout the lower extremity secondary to neuromuscular inhibition, atrophy, and pain. This causes difficulty with weight bearing activities such as walking, getting up from a low chair, going up/down stairs, and recreational activities such as running and playing pickle ball. Skilled therapy is required to return to prior level of function. PROBLEM LIST (Impacting functional limitations):  1. Decreased Strength  2. Decreased ADL/Functional Activities  3. Decreased Ambulation Ability/Technique  4. Decreased Balance  5. Increased Pain  6. Decreased Activity Tolerance  7. Decreased Flexibility/Joint Mobility INTERVENTIONS PLANNED:  1. Balance Exercise  2. Family Education  3. Gait Training  4. Home Exercise Program (HEP)  5. Manual Therapy  6. Neuromuscular Re-education/Strengthening  7.  Range of Motion (ROM)  8. Therapeutic Activites  9. Therapeutic Exercise/Strengthening  10. modalities    TREATMENT PLAN:  Effective Dates: 10/15/2018 TO 1/14/2019 (90 days). Frequency/Duration: 3 times a week for 90 Days  GOALS: (Goals have been discussed and agreed upon with patient.)  Short-Term Functional Goals: Time Frame: 2 weeks  1. Patient will be independent with HEP. 2. Patient will improve active knee extension to 0 ° during stance to restore symmetric joint loading during stance phase of gait. 3. Patient will report pain <2/10 with daily activity. Discharge Goals: Time Frame: 6 weeks  1. Patient will improve knee mobility to 2-0-135 ° to normalize joint mobility for symmetric gait on all surfaces and at all speeds. 2. Patient will report no pain with progression to independent gym routine to return to prior level of function. 3. Patient will improve knee extension strength to 5/5 to restore dynamic stability required for stability during gait at all speeds. Rehabilitation Potential For Stated Goals: Excellent                HISTORY:   History of Present Injury/Illness (Reason for Referral):  Patient presents to therapy with primary complaint of left knee pain, stiffness, and swelling following above surgical procedure. He reports a 2 year history of intermittent knee pain and stiffness associated with running and activities such as going down stairs that was previously managed with conservative methods. He had been able to return to a limited running regimen but over the summer was beginning to have increased stiffness, increased discomfort for longer periods into his run, and increased pain on the days following his run. He was also having more sensitivity with general daily walking activities. He notes no complications with surgery and has only had to take Aleeve on the afternoon following surgery and the morning after surgery. Otherwise he has been icing to manage his discomfort.  He does report some stiffness and soreness this morning that he attributes to returning to work at his standing desk and doing a little bit of walking over the weekend. He is an active individual and would like to return to activities such as running, playing pickleball and hiking. Past Medical History/Comorbidities:   Mr. Buddy Eubanks  has a past medical history of Acute sinusitis, Arthritis, Bilateral impacted cerumen, Chronic sinusitis, Conductive hearing loss, Esophageal reflux, Hearing loss, Hearing loss due to cerumen impaction, Itching of ear, and Left knee pain. Mr. Buddy Eubanks  has a past surgical history that includes hx lumbar laminectomy; hx tonsillectomy; hx wisdom teeth extraction; hx colonoscopy; and LEFT KNEE ARTHROSCOPY/  MEDIAL AND LATERAL MENISCECTOMY (Left, 10/12/2018). Social History/Living Environment:     Patient lives at home with his wife. Prior Level of Function/Work/Activity:  Patient is a . He enjoys running, playing pickleball and hiking. Dominant Side:         RIGHT  Current Medications:    Current Outpatient Medications:     aspirin delayed-release 81 mg tablet, Take 81 mg by mouth daily. Indications: prevention, Disp: , Rfl:     cetirizine (ZYRTEC) 10 mg tablet, Take 10 mg by mouth every other day., Disp: , Rfl:    Date Last Reviewed:  10/18/2018   EXAMINATION:   Observation/Orthostatic Postural Assessment:          Incision sites are closed and covered with steri strips. There appears to be some blistering at the superior portion of the lateral incision. Girth: Calf: 37.5cm L, 38cm R; Mid patella: 40cm L, 38cm R; Mid thigh: 48cm L, 49cm R  Palpation:          Mild tenderness along incision sites.   ROM:     Left knee: 2-120 ° active; 0 to 125 ° passive  Right knee: 1-0-135 °   SLR: 60 ° L; 70 ° R  Hip extension: WNL      Strength:     Quad set is fair  SLR performed with 5 ° extension lag  Knee extension: 4-/5 L; 5/5 R  SLR: 4/5 L; 5/5 R  Hip abduction: 4/5 L; 5/5 R      Special Tests: Not applicable  Neurological Screen:        Light touch sensation is fully intact. Functional Mobility:         Ambulates without assistive device. Ambulates with slight increase in knee flexion during stance phase of gait. Balance:          Maintains single leg stance with eyes open for 5 seconds. Body Structures Involved:  1. Nerves  2. Bones  3. Joints  4. Muscles  5. Ligaments Body Functions Affected:  1. Sensory/Pain  2. Neuromusculoskeletal  3. Movement Related Activities and Participation Affected:  1. Learning and Applying Knowledge  2. General Tasks and Demands  3. Mobility  4. Self Care  5. Domestic Life  6. Interpersonal Interactions and Relationships  7. Community, Social and Civic Life   CLINICAL PRESENTATION:   CLINICAL DECISION MAKING:   Outcome Measure: Tool Used: Lower Extremity Functional Scale (LEFS)  Score:  Initial: 47/80 Most Recent: X/80 (Date: -- )   Interpretation of Score: 20 questions each scored on a 5 point scale with 0 representing \"extreme difficulty or unable to perform\" and 4 representing \"no difficulty\". The lower the score, the greater the functional disability. 80/80 represents no disability. Minimal detectable change is 9 points. Score 80 79-63 62-48 47-32 31-16 15-1 0   Modifier CH CI CJ CK CL CM CN     ? Mobility - Walking and Moving Around:     - CURRENT STATUS: CK - 40%-59% impaired, limited or restricted    - GOAL STATUS: CI - 1%-19% impaired, limited or restricted    - D/C STATUS:  ---------------To be determined---------------    Medical Necessity:   · Patient is expected to demonstrate progress in strength, range of motion, balance and coordination to increase independence with community mobility and return to prior level of function. Reason for Services/Other Comments:  · Patient has demonstrated an improvement in functional level by independent performance of HEP.    TREATMENT:   (In addition to Assessment/Re-Assessment sessions the following treatments were rendered)  THERAPEUTIC EXERCISE: (see below for minutes):  Exercises per grid below to improve mobility, strength, balance and coordination. Required minimal verbal and manual cues to promote proper body alignment, promote proper body posture and promote proper body mechanics. Progressed resistance, range, repetitions and complexity of movement as indicated. MANUAL THERAPY: (see below for minutes): Joint mobilization and Soft tissue mobilization was utilized and necessary because of the patient's restricted joint motion, painful spasm, loss of articular motion and restricted motion of soft tissue. MODALITIES: (see below for minutes):       To decrease pain     Date: 10/15/18 10/17/18 (visit 2) 10/18/18 (visit 3)     Modalities:  15 min 15 min       gameready x 15 min (vasopneumatic) to assist with edema/swelling management repeat                     Therapeutic Exercise: 15 min 45 min 30 min     Bike 5 min 5 min 5 min     Heel slides [id number] repeat repeat     Quad set [id number] with heel elevated       SLR 3x10 With NMES x 10 min 10sec on/off queing quad set With NMES 5sec on//off x 10 min 5# weight     Hip abduction 3x10 Clamshell 3x15      Stretching Instructed with calf and hamstring stretching for home Manual stretching into knee extension gr 3 3x30 Repeat; prone quad stretch 5x10 sec     FTKE  40x black tband 40x black tband     Walking march  x2 laps      Bridge  With heels on stability ball 3x10      Lateral band walks  Black x 3 laps Black x 3 laps, kick backs 3x10     Single leg deadlifts   Next visit                             Proprioceptive Activities:                                Manual Therapy: 10 min  15 min     Mobilizations Gr 3 patellar glides, mobs into extension  Soft tissue mobilization and edema management to posterior knee             Therapeutic Activities:                                        HEP: see 10/15/18 flow sheet for specifics  [facility] Portal  Treatment/Session Assessment:  Quad strength is continuing to progress with use of functional stim. · Pain/ Symptoms: Initial:   3/10 \"It's feeling a little more flexible and it wasn't as stiff this morning. \"  Post Session:  No increase following today's treatment session. ·   Compliance with Program/Exercises: Will assess as treatment progresses. · Recommendations/Intent for next treatment session: \"Next visit will focus on advancements to more challenging activities\".   Total Treatment Duration:  PT Patient Time In/Time Out  Time In: 1445  Time Out: [id number], DPT

## 2018-10-22 ENCOUNTER — HOSPITAL ENCOUNTER (OUTPATIENT)
Dept: PHYSICAL THERAPY | Age: 68
Discharge: HOME | End: 2018-10-22
Payer: MEDICARE

## 2018-10-22 PROCEDURE — 97110 THERAPEUTIC EXERCISES: CPT

## 2018-10-22 PROCEDURE — 97016 VASOPNEUMATIC DEVICE THERAPY: CPT

## 2018-10-22 NOTE — PROGRESS NOTES
Mohan Combs  : 1950 [address] [place] [address], [address] [place] [address]  [phone]   [phone]        OUTPATIENT PHYSICAL THERAPY:Daily Note 10/22/2018         ICD-10: Treatment Diagnosis: Pain in left knee (M25.562); Difficulty in walking, not elsewhere classified (R26.2)  Precautions/Allergies:   Patient has no known allergies. Fall Risk Score: 1 (? 5 = High Risk)  MD Orders: evaluate and treat MEDICAL/REFERRING DIAGNOSIS:  Unilateral primary osteoarthritis, left knee [M17.12]  Chondromalacia patellae, left knee [M22.42]  Other tear of lateral meniscus, current injury, left knee, initial encounter [G26.254Y]   DATE OF ONSET: date of surgery: 10/12/18 s/p left knee arthroscopy (please refer to surgical notes for specifics. REFERRING PHYSICIAN: Qamar Valadez MD  RETURN PHYSICIAN APPOINTMENT: 10/29/18     INITIAL ASSESSMENT:  Mr. Ector Forbes presents to therapy with decreased joint mobility at the knee into extension and flexion; restricted flexibility of the quadriceps, hamstrings and calf musculature, decreased strength throughout the lower extremity secondary to neuromuscular inhibition, atrophy, and pain. This causes difficulty with weight bearing activities such as walking, getting up from a low chair, going up/down stairs, and recreational activities such as running and playing pickle ball. Skilled therapy is required to return to prior level of function. PROBLEM LIST (Impacting functional limitations):  1. Decreased Strength  2. Decreased ADL/Functional Activities  3. Decreased Ambulation Ability/Technique  4. Decreased Balance  5. Increased Pain  6. Decreased Activity Tolerance  7. Decreased Flexibility/Joint Mobility INTERVENTIONS PLANNED:  1. Balance Exercise  2. Family Education  3. Gait Training  4. Home Exercise Program (HEP)  5. Manual Therapy  6. Neuromuscular Re-education/Strengthening  7.  Range of Motion (ROM)  8. Therapeutic Activites  9. Therapeutic Exercise/Strengthening  10. modalities    TREATMENT PLAN:  Effective Dates: 10/15/2018 TO 1/14/2019 (90 days). Frequency/Duration: 3 times a week for 90 Days  GOALS: (Goals have been discussed and agreed upon with patient.)  Short-Term Functional Goals: Time Frame: 2 weeks  1. Patient will be independent with HEP. 2. Patient will improve active knee extension to 0 ° during stance to restore symmetric joint loading during stance phase of gait. 3. Patient will report pain <2/10 with daily activity. Discharge Goals: Time Frame: 6 weeks  1. Patient will improve knee mobility to 2-0-135 ° to normalize joint mobility for symmetric gait on all surfaces and at all speeds. 2. Patient will report no pain with progression to independent gym routine to return to prior level of function. 3. Patient will improve knee extension strength to 5/5 to restore dynamic stability required for stability during gait at all speeds. Rehabilitation Potential For Stated Goals: Excellent                HISTORY:   History of Present Injury/Illness (Reason for Referral):  Patient presents to therapy with primary complaint of left knee pain, stiffness, and swelling following above surgical procedure. He reports a 2 year history of intermittent knee pain and stiffness associated with running and activities such as going down stairs that was previously managed with conservative methods. He had been able to return to a limited running regimen but over the summer was beginning to have increased stiffness, increased discomfort for longer periods into his run, and increased pain on the days following his run. He was also having more sensitivity with general daily walking activities. He notes no complications with surgery and has only had to take Aleeve on the afternoon following surgery and the morning after surgery. Otherwise he has been icing to manage his discomfort.  He does report some stiffness and soreness this morning that he attributes to returning to work at his standing desk and doing a little bit of walking over the weekend. He is an active individual and would like to return to activities such as running, playing pickleball and hiking. Past Medical History/Comorbidities:   Mr. Alicia Peace  has a past medical history of Acute sinusitis, Arthritis, Bilateral impacted cerumen, Chronic sinusitis, Conductive hearing loss, Esophageal reflux, Hearing loss, Hearing loss due to cerumen impaction, Itching of ear, and Left knee pain. Mr. Ailcia Peace  has a past surgical history that includes hx lumbar laminectomy; hx tonsillectomy; hx wisdom teeth extraction; hx colonoscopy; and LEFT KNEE ARTHROSCOPY/  MEDIAL AND LATERAL MENISCECTOMY (Left, 10/12/2018). Social History/Living Environment:     Patient lives at home with his wife. Prior Level of Function/Work/Activity:  Patient is a . He enjoys running, playing pickleball and hiking. Dominant Side:         RIGHT  Current Medications:    Current Outpatient Medications:     aspirin delayed-release 81 mg tablet, Take 81 mg by mouth daily. Indications: prevention, Disp: , Rfl:     cetirizine (ZYRTEC) 10 mg tablet, Take 10 mg by mouth every other day., Disp: , Rfl:    Date Last Reviewed:  10/22/2018   EXAMINATION:   Observation/Orthostatic Postural Assessment:          Incision sites are closed and covered with steri strips. There appears to be some blistering at the superior portion of the lateral incision. Girth: Calf: 37.5cm L, 38cm R; Mid patella: 40cm L, 38cm R; Mid thigh: 48cm L, 49cm R  Palpation:          Mild tenderness along incision sites.   ROM:     Left knee: 2-120 ° active; 0 to 125 ° passive  Right knee: 1-0-135 °   SLR: 60 ° L; 70 ° R  Hip extension: WNL      Strength:     Quad set is fair  SLR performed with 5 ° extension lag  Knee extension: 4-/5 L; 5/5 R  SLR: 4/5 L; 5/5 R  Hip abduction: 4/5 L; 5/5 R      Special Tests: Not applicable  Neurological Screen:        Light touch sensation is fully intact. Functional Mobility:         Ambulates without assistive device. Ambulates with slight increase in knee flexion during stance phase of gait. Balance:          Maintains single leg stance with eyes open for 5 seconds. Body Structures Involved:  1. Nerves  2. Bones  3. Joints  4. Muscles  5. Ligaments Body Functions Affected:  1. Sensory/Pain  2. Neuromusculoskeletal  3. Movement Related Activities and Participation Affected:  1. Learning and Applying Knowledge  2. General Tasks and Demands  3. Mobility  4. Self Care  5. Domestic Life  6. Interpersonal Interactions and Relationships  7. Community, Social and Civic Life   CLINICAL PRESENTATION:   CLINICAL DECISION MAKING:   Outcome Measure: Tool Used: Lower Extremity Functional Scale (LEFS)  Score:  Initial: 47/80 Most Recent: X/80 (Date: -- )   Interpretation of Score: 20 questions each scored on a 5 point scale with 0 representing \"extreme difficulty or unable to perform\" and 4 representing \"no difficulty\". The lower the score, the greater the functional disability. 80/80 represents no disability. Minimal detectable change is 9 points. Score 80 79-63 62-48 47-32 31-16 15-1 0   Modifier CH CI CJ CK CL CM CN     ? Mobility - Walking and Moving Around:     - CURRENT STATUS: CK - 40%-59% impaired, limited or restricted    - GOAL STATUS: CI - 1%-19% impaired, limited or restricted    - D/C STATUS:  ---------------To be determined---------------    Medical Necessity:   · Patient is expected to demonstrate progress in strength, range of motion, balance and coordination to increase independence with community mobility and return to prior level of function. Reason for Services/Other Comments:  · Patient has demonstrated an improvement in functional level by independent performance of HEP.    TREATMENT:   (In addition to Assessment/Re-Assessment sessions the following treatments were rendered)  THERAPEUTIC EXERCISE: (see below for minutes):  Exercises per grid below to improve mobility, strength, balance and coordination. Required minimal verbal and manual cues to promote proper body alignment, promote proper body posture and promote proper body mechanics. Progressed resistance, range, repetitions and complexity of movement as indicated. MANUAL THERAPY: (see below for minutes): Joint mobilization and Soft tissue mobilization was utilized and necessary because of the patient's restricted joint motion, painful spasm, loss of articular motion and restricted motion of soft tissue. MODALITIES: (see below for minutes):       To decrease pain     Date: 10/15/18 10/17/18 (visit 2) 10/18/18 (visit 3) 10/22/18 (visit 4)    Modalities:  15 min 15 min 15 min      gameready x 15 min (vasopneumatic) to assist with edema/swelling management repeat repeat                    Therapeutic Exercise: 15 min 45 min 30 min 40 min    Bike 5 min 5 min 5 min 5 min    Heel slides [id number] repeat repeat     Quad set [id number] with heel elevated       SLR 3x10 With NMES x 10 min 10sec on/off queing quad set With NMES 5sec on//off x 10 min 5# weight Repeat 10sec on/off interval with 3 reps/interval 5#     Hip abduction 3x10 Clamshell 3x15      Stretching Instructed with calf and hamstring stretching for home Manual stretching into knee extension gr 3 3x30 Repeat; prone quad stretch 5x10 sec     FTKE  40x black tband 40x black tband 30x black tband    Walking march  x2 laps      Bridge  With heels on stability ball 3x10      Lateral band walks  Black x 3 laps Black x 3 laps, kick backs 3x10 repeat    Single leg deadlifts   Next visit 3x8 with reach to table    Single leg stance    5 min total progressing from floor to airex with 3 way reach    Incline board    2 min calf stretch            Proprioceptive Activities:                                Manual Therapy: 10 min  15 min 5 min Mobilizations Gr 3 patellar glides, mobs into extension  Soft tissue mobilization and edema management to posterior knee To ITB soft tissue mobilization            Therapeutic Activities:                                        HEP: see 10/15/18 flow sheet for specifics  [facility] Portal  Treatment/Session Assessment: Continues to have residual swelling under the patella. Added foam rolling to ITB for home program.     · Pain/ Symptoms: Initial:   3/10 \"The stiffness is getting better. I did well with the long ride over the weekend. \"  Post Session:  No increase following today's treatment session. ·   Compliance with Program/Exercises: Will assess as treatment progresses. · Recommendations/Intent for next treatment session: \"Next visit will focus on advancements to more challenging activities\".   Total Treatment Duration:  PT Patient Time In/Time Out  Time In: 1530  Time Out: [address] [place] FI[address]a

## 2018-10-24 ENCOUNTER — HOSPITAL ENCOUNTER (OUTPATIENT)
Dept: PHYSICAL THERAPY | Age: 68
Discharge: HOME | End: 2018-10-24
Payer: MEDICARE

## 2018-10-24 PROCEDURE — 97016 VASOPNEUMATIC DEVICE THERAPY: CPT

## 2018-10-24 PROCEDURE — 97110 THERAPEUTIC EXERCISES: CPT

## 2018-10-24 NOTE — PROGRESS NOTES
Isrrael Price  : 1950 [address] [address].  [phone]   [phone]        OUTPATIENT PHYSICAL THERAPY:Daily Note 10/24/2018         ICD-10: Treatment Diagnosis: Pain in left knee (M25.562); Difficulty in walking, not elsewhere classified (R26.2)  Precautions/Allergies:   Patient has no known allergies. Fall Risk Score: 1 (? 5 = High Risk)  MD Orders: evaluate and treat MEDICAL/REFERRING DIAGNOSIS:  Unilateral primary osteoarthritis, left knee [M17.12]  Chondromalacia patellae, left knee [M22.42]  Other tear of lateral meniscus, current injury, left knee, initial encounter [J68.187B]   DATE OF ONSET: date of surgery: 10/12/18 s/p left knee arthroscopy (please refer to surgical notes for specifics. REFERRING PHYSICIAN: Luciana Kramer MD  RETURN PHYSICIAN APPOINTMENT: 10/29/18     INITIAL ASSESSMENT:  Mr. Chio Romano presents to therapy with decreased joint mobility at the knee into extension and flexion; restricted flexibility of the quadriceps, hamstrings and calf musculature, decreased strength throughout the lower extremity secondary to neuromuscular inhibition, atrophy, and pain. This causes difficulty with weight bearing activities such as walking, getting up from a low chair, going up/down stairs, and recreational activities such as running and playing pickle ball. Skilled therapy is required to return to prior level of function. PROBLEM LIST (Impacting functional limitations):  1. Decreased Strength  2. Decreased ADL/Functional Activities  3. Decreased Ambulation Ability/Technique  4. Decreased Balance  5. Increased Pain  6. Decreased Activity Tolerance  7. Decreased Flexibility/Joint Mobility INTERVENTIONS PLANNED:  1. Balance Exercise  2. Family Education  3. Gait Training  4. Home Exercise Program (HEP)  5. Manual Therapy  6. Neuromuscular Re-education/Strengthening  7.  Range of Motion (ROM)  8. Therapeutic Activites  9. Therapeutic Exercise/Strengthening  10. modalities    TREATMENT PLAN:  Effective Dates: 10/15/2018 TO 1/14/2019 (90 days). Frequency/Duration: 3 times a week for 90 Days  GOALS: (Goals have been discussed and agreed upon with patient.)  Short-Term Functional Goals: Time Frame: 2 weeks  1. Patient will be independent with HEP. 2. Patient will improve active knee extension to 0 ° during stance to restore symmetric joint loading during stance phase of gait. 3. Patient will report pain <2/10 with daily activity. Discharge Goals: Time Frame: 6 weeks  1. Patient will improve knee mobility to 2-0-135 ° to normalize joint mobility for symmetric gait on all surfaces and at all speeds. 2. Patient will report no pain with progression to independent gym routine to return to prior level of function. 3. Patient will improve knee extension strength to 5/5 to restore dynamic stability required for stability during gait at all speeds. Rehabilitation Potential For Stated Goals: Excellent                HISTORY:   History of Present Injury/Illness (Reason for Referral):  Patient presents to therapy with primary complaint of left knee pain, stiffness, and swelling following above surgical procedure. He reports a 2 year history of intermittent knee pain and stiffness associated with running and activities such as going down stairs that was previously managed with conservative methods. He had been able to return to a limited running regimen but over the summer was beginning to have increased stiffness, increased discomfort for longer periods into his run, and increased pain on the days following his run. He was also having more sensitivity with general daily walking activities. He notes no complications with surgery and has only had to take Aleeve on the afternoon following surgery and the morning after surgery. Otherwise he has been icing to manage his discomfort.  He does report some stiffness and soreness this morning that he attributes to returning to work at his standing desk and doing a little bit of walking over the weekend. He is an active individual and would like to return to activities such as running, playing pickleball and hiking. Past Medical History/Comorbidities:   Mr. Daily Dhillon  has a past medical history of Acute sinusitis, Arthritis, Bilateral impacted cerumen, Chronic sinusitis, Conductive hearing loss, Esophageal reflux, Hearing loss, Hearing loss due to cerumen impaction, Itching of ear, and Left knee pain. Mr. Daily Dhillon  has a past surgical history that includes hx lumbar laminectomy; hx tonsillectomy; hx wisdom teeth extraction; hx colonoscopy; and LEFT KNEE ARTHROSCOPY/  MEDIAL AND LATERAL MENISCECTOMY (Left, 10/12/2018). Social History/Living Environment:     Patient lives at home with his wife. Prior Level of Function/Work/Activity:  Patient is a . He enjoys running, playing pickleball and hiking. Dominant Side:         RIGHT  Current Medications:    Current Outpatient Medications:     aspirin delayed-release 81 mg tablet, Take 81 mg by mouth daily. Indications: prevention, Disp: , Rfl:     cetirizine (ZYRTEC) 10 mg tablet, Take 10 mg by mouth every other day., Disp: , Rfl:    Date Last Reviewed:  10/24/2018   EXAMINATION:   Observation/Orthostatic Postural Assessment:          Incision sites are closed and covered with steri strips. There appears to be some blistering at the superior portion of the lateral incision. Girth: Calf: 37.5cm L, 38cm R; Mid patella: 40cm L, 38cm R; Mid thigh: 48cm L, 49cm R  Palpation:          Mild tenderness along incision sites.   ROM:     Left knee: 2-120 ° active; 0 to 125 ° passive  Right knee: 1-0-135 °   SLR: 60 ° L; 70 ° R  Hip extension: WNL      Strength:     Quad set is fair  SLR performed with 5 ° extension lag  Knee extension: 4-/5 L; 5/5 R  SLR: 4/5 L; 5/5 R  Hip abduction: 4/5 L; 5/5 R      Special Tests: Not applicable  Neurological Screen:        Light touch sensation is fully intact. Functional Mobility:         Ambulates without assistive device. Ambulates with slight increase in knee flexion during stance phase of gait. Balance:          Maintains single leg stance with eyes open for 5 seconds. Body Structures Involved:  1. Nerves  2. Bones  3. Joints  4. Muscles  5. Ligaments Body Functions Affected:  1. Sensory/Pain  2. Neuromusculoskeletal  3. Movement Related Activities and Participation Affected:  1. Learning and Applying Knowledge  2. General Tasks and Demands  3. Mobility  4. Self Care  5. Domestic Life  6. Interpersonal Interactions and Relationships  7. Community, Social and Civic Life   CLINICAL PRESENTATION:   CLINICAL DECISION MAKING:   Outcome Measure: Tool Used: Lower Extremity Functional Scale (LEFS)  Score:  Initial: 47/80 Most Recent: X/80 (Date: -- )   Interpretation of Score: 20 questions each scored on a 5 point scale with 0 representing \"extreme difficulty or unable to perform\" and 4 representing \"no difficulty\". The lower the score, the greater the functional disability. 80/80 represents no disability. Minimal detectable change is 9 points. Score 80 79-63 62-48 47-32 31-16 15-1 0   Modifier CH CI CJ CK CL CM CN     ? Mobility - Walking and Moving Around:     - CURRENT STATUS: CK - 40%-59% impaired, limited or restricted    - GOAL STATUS: CI - 1%-19% impaired, limited or restricted    - D/C STATUS:  ---------------To be determined---------------    Medical Necessity:   · Patient is expected to demonstrate progress in strength, range of motion, balance and coordination to increase independence with community mobility and return to prior level of function. Reason for Services/Other Comments:  · Patient has demonstrated an improvement in functional level by independent performance of HEP.    TREATMENT:   (In addition to Assessment/Re-Assessment sessions the following treatments were rendered)  THERAPEUTIC EXERCISE: (see below for minutes):  Exercises per grid below to improve mobility, strength, balance and coordination. Required minimal verbal and manual cues to promote proper body alignment, promote proper body posture and promote proper body mechanics. Progressed resistance, range, repetitions and complexity of movement as indicated. MANUAL THERAPY: (see below for minutes): Joint mobilization and Soft tissue mobilization was utilized and necessary because of the patient's restricted joint motion, painful spasm, loss of articular motion and restricted motion of soft tissue. MODALITIES: (see below for minutes):       To decrease pain     Date: 10/15/18 10/17/18 (visit 2) 10/18/18 (visit 3) 10/22/18 (visit 4) 10/24/18 (visit 5)   Modalities:  15 min 15 min 15 min 15 min     gameready x 15 min (vasopneumatic) to assist with edema/swelling management repeat repeat repeat                   Therapeutic Exercise: 15 min 45 min 30 min 40 min 40 min   Bike 5 min 5 min 5 min 5 min 5 min   Heel slides [id number] repeat repeat     Quad set [id number] with heel elevated       SLR 3x10 With NMES x 10 min 10sec on/off queing quad set With NMES 5sec on//off x 10 min 5# weight Repeat 10sec on/off interval with 3 reps/interval 5#  5 min with NMES 10sec on/off 5#    Hip abduction 3x10 Clamshell 3x15      Stretching Instructed with calf and hamstring stretching for home Manual stretching into knee extension gr 3 3x30 Repeat; prone quad stretch 5x10 sec  Manual stretching into terminal knee extension; prone quad stretch 5 min total   FTKE  40x black tband 40x black tband 30x black tband    Walking march  x2 laps      Bridge  With heels on stability ball 3x10      Lateral band walks  Black x 3 laps Black x 3 laps, kick backs 3x10 repeat Black x 3 laps lateral   Single leg deadlifts   Next visit 3x8 with reach to table    Single leg stance    5 min total progressing from floor to airex with 3 way reach    Incline board    2 min calf stretch 2x1 min calf stretch   Leg press     80# x 10 B; 40# 2x10 U   Step ups     10\" lateral 3x10 to single leg stance   Stability ball hamstring curls     3x10                   Proprioceptive Activities:                                Manual Therapy: 10 min  15 min 5 min 5 min   Mobilizations Gr 3 patellar glides, mobs into extension  Soft tissue mobilization and edema management to posterior knee To ITB soft tissue mobilization repeat           Therapeutic Activities:                                        HEP: see 10/15/18 flow sheet for specifics  [facility] Portal  Treatment/Session Assessment: No anterior knee pain with overpressure into knee flexion, mobility limitation primarily limited to quadriceps tightness. Mild increase in pressure with single leg leg press, but no pain with step ups. .     · Pain/ Symptoms: Initial:   3/10 Has progressed to 25 min on stationary bike with no discomfort. Continues to have mild-moderate general soreness. No episodes of giving way. Post Session:  No increase following today's treatment session. ·   Compliance with Program/Exercises: Will assess as treatment progresses. · Recommendations/Intent for next treatment session: \"Next visit will focus on advancements to more challenging activities\".   Total Treatment Duration:  PT Patient Time In/Time Out  Time In: 0800  Time Out: 0900     Madhav Eldridge DPT

## 2018-10-25 ENCOUNTER — HOSPITAL ENCOUNTER (OUTPATIENT)
Dept: PHYSICAL THERAPY | Age: 68
Discharge: HOME | End: 2018-10-25
Payer: MEDICARE

## 2018-10-25 PROCEDURE — 97110 THERAPEUTIC EXERCISES: CPT

## 2018-10-25 PROCEDURE — 97016 VASOPNEUMATIC DEVICE THERAPY: CPT

## 2018-10-25 NOTE — PROGRESS NOTES
Mohan Combs   [phone] [facility]  [address] [place] [address].  [phone]   [phone]           PHYSICIAN COMMUNICATION    REFERRING PHYSICIAN: Kane Morrow MD  Return Physician Appointment: 10/29/18  MEDICAL/REFERRING DIAGNOSIS:  · Unilateral primary osteoarthritis, left knee [M17.12]  · Chondromalacia patellae, left knee [M22.42]  · Other tear of lateral meniscus, current injury, left knee, initial encounter [S83.892A]  ATTENDANCE: Mohan Combs has attended 5 out of 5 visits, with 0 cancellation(s) and 0 no shows. ASSESSMENT:  DATE: 10/25/2018    PROGRESS: Mohan Combs has been progressing well with therapy. Swelling has resolved. Joint mobility is almost symmetric with uninvolved with only slight restriction in prone knee flexion. He has full knee extension with normal end feel. We are progressing with quad strengthening activities. He continues to have mild soreness with stair descent, but general stiffness is resolving. RECOMMENDATIONS: Will continue with therapy per initial plan of care.      Thank you for this referral,  Carlos Eduardo Mccarthy DPT

## 2018-10-25 NOTE — PROGRESS NOTES
Toby Lucia  : 1950 [id number] [facility],[address]  [address] [place] [address].  [phone]   [phone]        OUTPATIENT PHYSICAL THERAPY:Daily Note 10/25/2018         ICD-10: Treatment Diagnosis: Pain in left knee (M25.562); Difficulty in walking, not elsewhere classified (R26.2)  Precautions/Allergies:   Patient has no known allergies. Fall Risk Score: 1 (? 5 = High Risk)  MD Orders: evaluate and treat MEDICAL/REFERRING DIAGNOSIS:  Unilateral primary osteoarthritis, left knee [M17.12]  Chondromalacia patellae, left knee [M22.42]  Other tear of lateral meniscus, current injury, left knee, initial encounter [H80.076M]   DATE OF ONSET: date of surgery: 10/12/18 s/p left knee arthroscopy (please refer to surgical notes for specifics. REFERRING PHYSICIAN: Jordyn Kam MD  RETURN PHYSICIAN APPOINTMENT: 10/29/18     INITIAL ASSESSMENT:  Mr. Fermin Cervantes presents to therapy with decreased joint mobility at the knee into extension and flexion; restricted flexibility of the quadriceps, hamstrings and calf musculature, decreased strength throughout the lower extremity secondary to neuromuscular inhibition, atrophy, and pain. This causes difficulty with weight bearing activities such as walking, getting up from a low chair, going up/down stairs, and recreational activities such as running and playing pickle ball. Skilled therapy is required to return to prior level of function. PROBLEM LIST (Impacting functional limitations):  1. Decreased Strength  2. Decreased ADL/Functional Activities  3. Decreased Ambulation Ability/Technique  4. Decreased Balance  5. Increased Pain  6. Decreased Activity Tolerance  7. Decreased Flexibility/Joint Mobility INTERVENTIONS PLANNED:  1. Balance Exercise  2. Family Education  3. Gait Training  4. Home Exercise Program (HEP)  5. Manual Therapy  6. Neuromuscular Re-education/Strengthening  7.  Range of Motion (ROM)  8. Therapeutic Activites  9. Therapeutic Exercise/Strengthening  10. modalities    TREATMENT PLAN:  Effective Dates: 10/15/2018 TO 1/14/2019 (90 days). Frequency/Duration: 3 times a week for 90 Days  GOALS: (Goals have been discussed and agreed upon with patient.)  Short-Term Functional Goals: Time Frame: 2 weeks  1. Patient will be independent with HEP. 2. Patient will improve active knee extension to 0 ° during stance to restore symmetric joint loading during stance phase of gait. 3. Patient will report pain <2/10 with daily activity. Discharge Goals: Time Frame: 6 weeks  1. Patient will improve knee mobility to 2-0-135 ° to normalize joint mobility for symmetric gait on all surfaces and at all speeds. 2. Patient will report no pain with progression to independent gym routine to return to prior level of function. 3. Patient will improve knee extension strength to 5/5 to restore dynamic stability required for stability during gait at all speeds. Rehabilitation Potential For Stated Goals: Excellent                HISTORY:   History of Present Injury/Illness (Reason for Referral):  Patient presents to therapy with primary complaint of left knee pain, stiffness, and swelling following above surgical procedure. He reports a 2 year history of intermittent knee pain and stiffness associated with running and activities such as going down stairs that was previously managed with conservative methods. He had been able to return to a limited running regimen but over the summer was beginning to have increased stiffness, increased discomfort for longer periods into his run, and increased pain on the days following his run. He was also having more sensitivity with general daily walking activities. He notes no complications with surgery and has only had to take Aleeve on the afternoon following surgery and the morning after surgery. Otherwise he has been icing to manage his discomfort.  He does report some stiffness and soreness this morning that he attributes to returning to work at his standing desk and doing a little bit of walking over the weekend. He is an active individual and would like to return to activities such as running, playing pickleball and hiking. Past Medical History/Comorbidities:   Mr. Daily Dhillon  has a past medical history of Acute sinusitis, Arthritis, Bilateral impacted cerumen, Chronic sinusitis, Conductive hearing loss, Esophageal reflux, Hearing loss, Hearing loss due to cerumen impaction, Itching of ear, and Left knee pain. Mr. Daily Dhillon  has a past surgical history that includes hx lumbar laminectomy; hx tonsillectomy; hx wisdom teeth extraction; hx colonoscopy; and LEFT KNEE ARTHROSCOPY/  MEDIAL AND LATERAL MENISCECTOMY (Left, 10/12/2018). Social History/Living Environment:     Patient lives at home with his wife. Prior Level of Function/Work/Activity:  Patient is a . He enjoys running, playing pickleball and hiking. Dominant Side:         RIGHT  Current Medications:    Current Outpatient Medications:     aspirin delayed-release 81 mg tablet, Take 81 mg by mouth daily. Indications: prevention, Disp: , Rfl:     cetirizine (ZYRTEC) 10 mg tablet, Take 10 mg by mouth every other day., Disp: , Rfl:    Date Last Reviewed:  10/25/2018   EXAMINATION:   Observation/Orthostatic Postural Assessment:          Incision sites are closed and covered with steri strips. There appears to be some blistering at the superior portion of the lateral incision. Girth: Calf: 37.5cm L, 38cm R; Mid patella: 40cm L, 38cm R; Mid thigh: 48cm L, 49cm R  Palpation:          Mild tenderness along incision sites.   ROM:     Left knee: 2-120 ° active; 0 to 125 ° passive  Right knee: 1-0-135 °   SLR: 60 ° L; 70 ° R  Hip extension: WNL      Strength:     Quad set is fair  SLR performed with 5 ° extension lag  Knee extension: 4-/5 L; 5/5 R  SLR: 4/5 L; 5/5 R  Hip abduction: 4/5 L; 5/5 R      Special Tests: Not applicable  Neurological Screen:        Light touch sensation is fully intact. Functional Mobility:         Ambulates without assistive device. Ambulates with slight increase in knee flexion during stance phase of gait. Balance:          Maintains single leg stance with eyes open for 5 seconds. Body Structures Involved:  1. Nerves  2. Bones  3. Joints  4. Muscles  5. Ligaments Body Functions Affected:  1. Sensory/Pain  2. Neuromusculoskeletal  3. Movement Related Activities and Participation Affected:  1. Learning and Applying Knowledge  2. General Tasks and Demands  3. Mobility  4. Self Care  5. Domestic Life  6. Interpersonal Interactions and Relationships  7. Community, Social and Civic Life   CLINICAL PRESENTATION:   CLINICAL DECISION MAKING:   Outcome Measure: Tool Used: Lower Extremity Functional Scale (LEFS)  Score:  Initial: 47/80 Most Recent: X/80 (Date: -- )   Interpretation of Score: 20 questions each scored on a 5 point scale with 0 representing \"extreme difficulty or unable to perform\" and 4 representing \"no difficulty\". The lower the score, the greater the functional disability. 80/80 represents no disability. Minimal detectable change is 9 points. Score 80 79-63 62-48 47-32 31-16 15-1 0   Modifier CH CI CJ CK CL CM CN     ? Mobility - Walking and Moving Around:     - CURRENT STATUS: CK - 40%-59% impaired, limited or restricted    - GOAL STATUS: CI - 1%-19% impaired, limited or restricted    - D/C STATUS:  ---------------To be determined---------------    Medical Necessity:   · Patient is expected to demonstrate progress in strength, range of motion, balance and coordination to increase independence with community mobility and return to prior level of function. Reason for Services/Other Comments:  · Patient has demonstrated an improvement in functional level by independent performance of HEP.    TREATMENT:   (In addition to Assessment/Re-Assessment sessions the following treatments were rendered)  THERAPEUTIC EXERCISE: (see below for minutes):  Exercises per grid below to improve mobility, strength, balance and coordination. Required minimal verbal and manual cues to promote proper body alignment, promote proper body posture and promote proper body mechanics. Progressed resistance, range, repetitions and complexity of movement as indicated. MANUAL THERAPY: (see below for minutes): Joint mobilization and Soft tissue mobilization was utilized and necessary because of the patient's restricted joint motion, painful spasm, loss of articular motion and restricted motion of soft tissue. MODALITIES: (see below for minutes):       To decrease pain     Date: 10/15/18 10/17/18 (visit 2) 10/18/18 (visit 3) 10/22/18 (visit 4) 10/24/18 (visit 5) 10/25/18 (visit 6)   Modalities:  15 min 15 min 15 min 15 min 15 min     gameready x 15 min (vasopneumatic) to assist with edema/swelling management repeat repeat repeat repeat                     Therapeutic Exercise: 15 min 45 min 30 min 40 min 40 min 40 min   Bike 5 min 5 min 5 min 5 min 5 min 5 min   Heel slides [id number] repeat repeat      Quad set [id number] with heel elevated        SLR 3x10 With NMES x 10 min 10sec on/off queing quad set With NMES 5sec on//off x 10 min 5# weight Repeat 10sec on/off interval with 3 reps/interval 5#  5 min with NMES 10sec on/off 5#  10 min total, 5# with SLR 10sec on/off x 5 min; 15# total with LAQ 5sec on/off x 5 min to 30 ° extension   Hip abduction 3x10 Clamshell 3x15       Stretching Instructed with calf and hamstring stretching for home Manual stretching into knee extension gr 3 3x30 Repeat; prone quad stretch 5x10 sec  Manual stretching into terminal knee extension; prone quad stretch 5 min total repeat   FTKE  40x black tband 40x black tband 30x black tband     Walking march  x2 laps       Bridge  With heels on stability ball 3x10       Lateral band walks  Black x 3 laps Black x 3 laps, kick backs 3x10 repeat Black x 3 laps lateral Black x 3 laps lateral    Single leg deadlifts   Next visit 3x8 with reach to table     Single leg stance    5 min total progressing from floor to airex with 3 way reach     Incline board    2 min calf stretch 2x1 min calf stretch repeat   Leg press     80# x 10 B; 40# 2x10 U    Step ups     10\" lateral 3x10 to single leg stance 10\" lateral 3x10   Stability ball hamstring curls     3x10 3x10                     Proprioceptive Activities:                                    Manual Therapy: 10 min  15 min 5 min 5 min 5 min    Mobilizations Gr 3 patellar glides, mobs into extension  Soft tissue mobilization and edema management to posterior knee To ITB soft tissue mobilization repeat repeat            Therapeutic Activities:                                             HEP: see 10/15/18 flow sheet for specifics  [facility] Portal  Treatment/Session Assessment: No increase in joint soreness with addition of leg press on previous session. See MD note. Continues to have some discomfort with stair descent. · Pain/ Symptoms: Initial:   3/10 \"I'm feeling pretty good. \"  Post Session:  No increase following today's treatment session. ·   Compliance with Program/Exercises: Will assess as treatment progresses. · Recommendations/Intent for next treatment session: \"Next visit will focus on advancements to more challenging activities\".   Total Treatment Duration:  PT Patient Time In/Time Out  Time In: 1445  Time Out: [id number], DPT

## 2018-10-29 ENCOUNTER — HOSPITAL ENCOUNTER (OUTPATIENT)
Dept: PHYSICAL THERAPY | Age: 68
Discharge: HOME | End: 2018-10-29
Payer: MEDICARE

## 2018-10-29 PROCEDURE — 97110 THERAPEUTIC EXERCISES: CPT

## 2018-10-29 PROCEDURE — 97016 VASOPNEUMATIC DEVICE THERAPY: CPT

## 2018-10-29 NOTE — PROGRESS NOTES
Caryle Levin  : 1950 [address] [address]  [phone]   [phone]        OUTPATIENT PHYSICAL THERAPY:Daily Note 10/29/2018         ICD-10: Treatment Diagnosis: Pain in left knee (M25.562); Difficulty in walking, not elsewhere classified (R26.2)  Precautions/Allergies:   Patient has no known allergies. Fall Risk Score: 1 (? 5 = High Risk)  MD Orders: evaluate and treat MEDICAL/REFERRING DIAGNOSIS:  Unilateral primary osteoarthritis, left knee [M17.12]  Chondromalacia patellae, left knee [M22.42]  Other tear of lateral meniscus, current injury, left knee, initial encounter [P02.070G]   DATE OF ONSET: date of surgery: 10/12/18 s/p left knee arthroscopy (please refer to surgical notes for specifics. REFERRING PHYSICIAN: Debbie Man MD  RETURN PHYSICIAN APPOINTMENT: 10/29/18     INITIAL ASSESSMENT:  Mr. Nai Gil presents to therapy with decreased joint mobility at the knee into extension and flexion; restricted flexibility of the quadriceps, hamstrings and calf musculature, decreased strength throughout the lower extremity secondary to neuromuscular inhibition, atrophy, and pain. This causes difficulty with weight bearing activities such as walking, getting up from a low chair, going up/down stairs, and recreational activities such as running and playing pickle ball. Skilled therapy is required to return to prior level of function. PROBLEM LIST (Impacting functional limitations):  1. Decreased Strength  2. Decreased ADL/Functional Activities  3. Decreased Ambulation Ability/Technique  4. Decreased Balance  5. Increased Pain  6. Decreased Activity Tolerance  7. Decreased Flexibility/Joint Mobility INTERVENTIONS PLANNED:  1. Balance Exercise  2. Family Education  3. Gait Training  4. Home Exercise Program (HEP)  5. Manual Therapy  6. Neuromuscular Re-education/Strengthening  7.  Range of Motion (ROM)  8. Therapeutic Activites  9. Therapeutic Exercise/Strengthening  10. modalities    TREATMENT PLAN:  Effective Dates: 10/15/2018 TO 1/14/2019 (90 days). Frequency/Duration: 3 times a week for 90 Days  GOALS: (Goals have been discussed and agreed upon with patient.)  Short-Term Functional Goals: Time Frame: 2 weeks  1. Patient will be independent with HEP. 2. Patient will improve active knee extension to 0 ° during stance to restore symmetric joint loading during stance phase of gait. 3. Patient will report pain <2/10 with daily activity. Discharge Goals: Time Frame: 6 weeks  1. Patient will improve knee mobility to 2-0-135 ° to normalize joint mobility for symmetric gait on all surfaces and at all speeds. 2. Patient will report no pain with progression to independent gym routine to return to prior level of function. 3. Patient will improve knee extension strength to 5/5 to restore dynamic stability required for stability during gait at all speeds. Rehabilitation Potential For Stated Goals: Excellent                HISTORY:   History of Present Injury/Illness (Reason for Referral):  Patient presents to therapy with primary complaint of left knee pain, stiffness, and swelling following above surgical procedure. He reports a 2 year history of intermittent knee pain and stiffness associated with running and activities such as going down stairs that was previously managed with conservative methods. He had been able to return to a limited running regimen but over the summer was beginning to have increased stiffness, increased discomfort for longer periods into his run, and increased pain on the days following his run. He was also having more sensitivity with general daily walking activities. He notes no complications with surgery and has only had to take Aleeve on the afternoon following surgery and the morning after surgery. Otherwise he has been icing to manage his discomfort.  He does report some stiffness and soreness this morning that he attributes to returning to work at his standing desk and doing a little bit of walking over the weekend. He is an active individual and would like to return to activities such as running, playing pickleball and hiking. Past Medical History/Comorbidities:   Mr. Joey Perez  has a past medical history of Acute sinusitis, Arthritis, Bilateral impacted cerumen, Chronic sinusitis, Conductive hearing loss, Esophageal reflux, Hearing loss, Hearing loss due to cerumen impaction, Itching of ear, and Left knee pain. Mr. Joey Perez  has a past surgical history that includes hx lumbar laminectomy; hx tonsillectomy; hx wisdom teeth extraction; hx colonoscopy; and LEFT KNEE ARTHROSCOPY/  MEDIAL AND LATERAL MENISCECTOMY (Left, 10/12/2018). Social History/Living Environment:     Patient lives at home with his wife. Prior Level of Function/Work/Activity:  Patient is a . He enjoys running, playing pickleball and hiking. Dominant Side:         RIGHT  Current Medications:    Current Outpatient Medications:     aspirin delayed-release 81 mg tablet, Take 81 mg by mouth daily. Indications: prevention, Disp: , Rfl:     cetirizine (ZYRTEC) 10 mg tablet, Take 10 mg by mouth every other day., Disp: , Rfl:    Date Last Reviewed:  10/29/2018   EXAMINATION:   Observation/Orthostatic Postural Assessment:          Incision sites are closed and covered with steri strips. There appears to be some blistering at the superior portion of the lateral incision. Girth: Calf: 37.5cm L, 38cm R; Mid patella: 40cm L, 38cm R; Mid thigh: 48cm L, 49cm R  Palpation:          Mild tenderness along incision sites.   ROM:     Left knee: 2-120 ° active; 0 to 125 ° passive  Right knee: 1-0-135 °   SLR: 60 ° L; 70 ° R  Hip extension: WNL      Strength:     Quad set is fair  SLR performed with 5 ° extension lag  Knee extension: 4-/5 L; 5/5 R  SLR: 4/5 L; 5/5 R  Hip abduction: 4/5 L; 5/5 R      Special Tests: Not applicable  Neurological Screen:        Light touch sensation is fully intact. Functional Mobility:         Ambulates without assistive device. Ambulates with slight increase in knee flexion during stance phase of gait. Balance:          Maintains single leg stance with eyes open for 5 seconds. Body Structures Involved:  1. Nerves  2. Bones  3. Joints  4. Muscles  5. Ligaments Body Functions Affected:  1. Sensory/Pain  2. Neuromusculoskeletal  3. Movement Related Activities and Participation Affected:  1. Learning and Applying Knowledge  2. General Tasks and Demands  3. Mobility  4. Self Care  5. Domestic Life  6. Interpersonal Interactions and Relationships  7. Community, Social and Civic Life   CLINICAL PRESENTATION:   CLINICAL DECISION MAKING:   Outcome Measure: Tool Used: Lower Extremity Functional Scale (LEFS)  Score:  Initial: 47/80 Most Recent: X/80 (Date: -- )   Interpretation of Score: 20 questions each scored on a 5 point scale with 0 representing \"extreme difficulty or unable to perform\" and 4 representing \"no difficulty\". The lower the score, the greater the functional disability. 80/80 represents no disability. Minimal detectable change is 9 points. Score 80 79-63 62-48 47-32 31-16 15-1 0   Modifier CH CI CJ CK CL CM CN     ? Mobility - Walking and Moving Around:     - CURRENT STATUS: CK - 40%-59% impaired, limited or restricted    - GOAL STATUS: CI - 1%-19% impaired, limited or restricted    - D/C STATUS:  ---------------To be determined---------------    Medical Necessity:   · Patient is expected to demonstrate progress in strength, range of motion, balance and coordination to increase independence with community mobility and return to prior level of function. Reason for Services/Other Comments:  · Patient has demonstrated an improvement in functional level by independent performance of HEP.    TREATMENT:   (In addition to Assessment/Re-Assessment sessions the following treatments were rendered)  THERAPEUTIC EXERCISE: (see below for minutes):  Exercises per grid below to improve mobility, strength, balance and coordination. Required minimal verbal and manual cues to promote proper body alignment, promote proper body posture and promote proper body mechanics. Progressed resistance, range, repetitions and complexity of movement as indicated. MANUAL THERAPY: (see below for minutes): Joint mobilization and Soft tissue mobilization was utilized and necessary because of the patient's restricted joint motion, painful spasm, loss of articular motion and restricted motion of soft tissue. MODALITIES: (see below for minutes):       To decrease pain     Date: 10/15/18 10/17/18 (visit 2) 10/18/18 (visit 3) 10/22/18 (visit 4) 10/24/18 (visit 5) 10/25/18 (visit 6) 10/29/18 (visit 7)   Modalities:  15 min 15 min 15 min 15 min 15 min 15 min     gameready x 15 min (vasopneumatic) to assist with edema/swelling management repeat repeat repeat repeat repeat                       Therapeutic Exercise: 15 min 45 min 30 min 40 min 40 min 40 min 40 min   Bike 5 min 5 min 5 min 5 min 5 min 5 min 5 min   Heel slides [id number] repeat repeat       Quad set [id number] with heel elevated         SLR 3x10 With NMES x 10 min 10sec on/off queing quad set With NMES 5sec on//off x 10 min 5# weight Repeat 10sec on/off interval with 3 reps/interval 5#  5 min with NMES 10sec on/off 5#  10 min total, 5# with SLR 10sec on/off x 5 min; 15# total with LAQ 5sec on/off x 5 min to 30 ° extension repeat   Hip abduction 3x10 Clamshell 3x15        Stretching Instructed with calf and hamstring stretching for home Manual stretching into knee extension gr 3 3x30 Repeat; prone quad stretch 5x10 sec  Manual stretching into terminal knee extension; prone quad stretch 5 min total repeat Manual stretch into terminal knee extension   FTKE  40x black tband 40x black tband 30x black tband      Walking march x2 laps     Standing at cable column 25# 3x15   Bridge  With heels on stability ball 3x10        Lateral band walks  Black x 3 laps Black x 3 laps, kick backs 3x10 repeat Black x 3 laps lateral Black x 3 laps lateral  Black x 3 laps   Single leg deadlifts   Next visit 3x8 with reach to table      Single leg stance    5 min total progressing from floor to airex with 3 way reach      Incline board    2 min calf stretch 2x1 min calf stretch repeat repeat   Leg press     80# x 10 B; 40# 2x10 U     Step ups     10\" lateral 3x10 to single leg stance 10\" lateral 3x10    Stability ball hamstring curls     3x10 3x10 3x10   Lunge       Reverse with emmanuel slide 3x10 with manual cueing for frontal plane control             Proprioceptive Activities:                                        Manual Therapy: 10 min  15 min 5 min 5 min 5 min  5 min   Mobilizations Gr 3 patellar glides, mobs into extension  Soft tissue mobilization and edema management to posterior knee To ITB soft tissue mobilization repeat repeat repeat             Therapeutic Activities:                                                  HEP: see 10/15/18 flow sheet for specifics  [facility] Portal  Treatment/Session Assessment: Progressed to reverse lunge with good control of vertical shin posture. · Pain/ Symptoms: Initial:   3/10 Had follow up with MD earlier today with continuing improvement. Has increased to 35 min with stationary cycle. Did attempt leg press again yesterday and was a bit more uncomfortable. Post Session:  No increase following today's treatment session. ·   Compliance with Program/Exercises: Will assess as treatment progresses. · Recommendations/Intent for next treatment session: \"Next visit will focus on advancements to more challenging activities\".   Total Treatment Duration:  PT Patient Time In/Time Out  Time In: 1445  Time Out: [id number], DPT

## 2018-10-31 ENCOUNTER — HOSPITAL ENCOUNTER (OUTPATIENT)
Dept: PHYSICAL THERAPY | Age: 68
Discharge: HOME | End: 2018-10-31
Payer: MEDICARE

## 2018-10-31 PROCEDURE — 97016 VASOPNEUMATIC DEVICE THERAPY: CPT

## 2018-10-31 PROCEDURE — 97110 THERAPEUTIC EXERCISES: CPT

## 2018-10-31 NOTE — PROGRESS NOTES
Marilee Sargent  : 1950 [address] [place] [address].  [phone]   [phone]        OUTPATIENT PHYSICAL THERAPY:Daily Note 10/31/2018         ICD-10: Treatment Diagnosis: Pain in left knee (M25.562); Difficulty in walking, not elsewhere classified (R26.2)  Precautions/Allergies:   Patient has no known allergies. Fall Risk Score: 1 (? 5 = High Risk)  MD Orders: evaluate and treat MEDICAL/REFERRING DIAGNOSIS:  Unilateral primary osteoarthritis, left knee [M17.12]  Chondromalacia patellae, left knee [M22.42]  Other tear of lateral meniscus, current injury, left knee, initial encounter [J51.216Z]   DATE OF ONSET: date of surgery: 10/12/18 s/p left knee arthroscopy (please refer to surgical notes for specifics. REFERRING PHYSICIAN: Larry Josue MD  RETURN PHYSICIAN APPOINTMENT: 10/29/18     INITIAL ASSESSMENT:  Mr. María Elena Apple presents to therapy with decreased joint mobility at the knee into extension and flexion; restricted flexibility of the quadriceps, hamstrings and calf musculature, decreased strength throughout the lower extremity secondary to neuromuscular inhibition, atrophy, and pain. This causes difficulty with weight bearing activities such as walking, getting up from a low chair, going up/down stairs, and recreational activities such as running and playing pickle ball. Skilled therapy is required to return to prior level of function. PROBLEM LIST (Impacting functional limitations):  1. Decreased Strength  2. Decreased ADL/Functional Activities  3. Decreased Ambulation Ability/Technique  4. Decreased Balance  5. Increased Pain  6. Decreased Activity Tolerance  7. Decreased Flexibility/Joint Mobility INTERVENTIONS PLANNED:  1. Balance Exercise  2. Family Education  3. Gait Training  4. Home Exercise Program (HEP)  5. Manual Therapy  6. Neuromuscular Re-education/Strengthening  7.  Range of Motion (ROM)  8. Therapeutic Activites  9. Therapeutic Exercise/Strengthening  10. modalities    TREATMENT PLAN:  Effective Dates: 10/15/2018 TO 1/14/2019 (90 days). Frequency/Duration: 3 times a week for 90 Days  GOALS: (Goals have been discussed and agreed upon with patient.)  Short-Term Functional Goals: Time Frame: 2 weeks  1. Patient will be independent with HEP. 2. Patient will improve active knee extension to 0 ° during stance to restore symmetric joint loading during stance phase of gait. 3. Patient will report pain <2/10 with daily activity. Discharge Goals: Time Frame: 6 weeks  1. Patient will improve knee mobility to 2-0-135 ° to normalize joint mobility for symmetric gait on all surfaces and at all speeds. 2. Patient will report no pain with progression to independent gym routine to return to prior level of function. 3. Patient will improve knee extension strength to 5/5 to restore dynamic stability required for stability during gait at all speeds. Rehabilitation Potential For Stated Goals: Excellent                HISTORY:   History of Present Injury/Illness (Reason for Referral):  Patient presents to therapy with primary complaint of left knee pain, stiffness, and swelling following above surgical procedure. He reports a 2 year history of intermittent knee pain and stiffness associated with running and activities such as going down stairs that was previously managed with conservative methods. He had been able to return to a limited running regimen but over the summer was beginning to have increased stiffness, increased discomfort for longer periods into his run, and increased pain on the days following his run. He was also having more sensitivity with general daily walking activities. He notes no complications with surgery and has only had to take Aleeve on the afternoon following surgery and the morning after surgery. Otherwise he has been icing to manage his discomfort.  He does report some stiffness and soreness this morning that he attributes to returning to work at his standing desk and doing a little bit of walking over the weekend. He is an active individual and would like to return to activities such as running, playing pickleball and hiking. Past Medical History/Comorbidities:   Mr. Yannick Price  has a past medical history of Acute sinusitis, Arthritis, Bilateral impacted cerumen, Chronic sinusitis, Conductive hearing loss, Esophageal reflux, Hearing loss, Hearing loss due to cerumen impaction, Itching of ear, and Left knee pain. Mr. Yannick Price  has a past surgical history that includes hx lumbar laminectomy; hx tonsillectomy; hx wisdom teeth extraction; hx colonoscopy; and LEFT KNEE ARTHROSCOPY/  MEDIAL AND LATERAL MENISCECTOMY (Left, 10/12/2018). Social History/Living Environment:     Patient lives at home with his wife. Prior Level of Function/Work/Activity:  Patient is a . He enjoys running, playing pickleball and hiking. Dominant Side:         RIGHT  Current Medications:    Current Outpatient Medications:     aspirin delayed-release 81 mg tablet, Take 81 mg by mouth daily. Indications: prevention, Disp: , Rfl:     cetirizine (ZYRTEC) 10 mg tablet, Take 10 mg by mouth every other day., Disp: , Rfl:    Date Last Reviewed:  10/31/2018   EXAMINATION:   Observation/Orthostatic Postural Assessment:          Incision sites are closed and covered with steri strips. There appears to be some blistering at the superior portion of the lateral incision. Girth: Calf: 37.5cm L, 38cm R; Mid patella: 40cm L, 38cm R; Mid thigh: 48cm L, 49cm R  Palpation:          Mild tenderness along incision sites.   ROM:     Left knee: 2-120 ° active; 0 to 125 ° passive  Right knee: 1-0-135 °   SLR: 60 ° L; 70 ° R  Hip extension: WNL      Strength:     Quad set is fair  SLR performed with 5 ° extension lag  Knee extension: 4-/5 L; 5/5 R  SLR: 4/5 L; 5/5 R  Hip abduction: 4/5 L; 5/5 R      Special Tests: Not applicable  Neurological Screen:        Light touch sensation is fully intact. Functional Mobility:         Ambulates without assistive device. Ambulates with slight increase in knee flexion during stance phase of gait. Balance:          Maintains single leg stance with eyes open for 5 seconds. Body Structures Involved:  1. Nerves  2. Bones  3. Joints  4. Muscles  5. Ligaments Body Functions Affected:  1. Sensory/Pain  2. Neuromusculoskeletal  3. Movement Related Activities and Participation Affected:  1. Learning and Applying Knowledge  2. General Tasks and Demands  3. Mobility  4. Self Care  5. Domestic Life  6. Interpersonal Interactions and Relationships  7. Community, Social and Civic Life   CLINICAL PRESENTATION:   CLINICAL DECISION MAKING:   Outcome Measure: Tool Used: Lower Extremity Functional Scale (LEFS)  Score:  Initial: 47/80 Most Recent: X/80 (Date: -- )   Interpretation of Score: 20 questions each scored on a 5 point scale with 0 representing \"extreme difficulty or unable to perform\" and 4 representing \"no difficulty\". The lower the score, the greater the functional disability. 80/80 represents no disability. Minimal detectable change is 9 points. Score 80 79-63 62-48 47-32 31-16 15-1 0   Modifier CH CI CJ CK CL CM CN     ? Mobility - Walking and Moving Around:     - CURRENT STATUS: CK - 40%-59% impaired, limited or restricted    - GOAL STATUS: CI - 1%-19% impaired, limited or restricted    - D/C STATUS:  ---------------To be determined---------------    Medical Necessity:   · Patient is expected to demonstrate progress in strength, range of motion, balance and coordination to increase independence with community mobility and return to prior level of function. Reason for Services/Other Comments:  · Patient has demonstrated an improvement in functional level by independent performance of HEP.    TREATMENT:   (In addition to Assessment/Re-Assessment sessions the following treatments were rendered)  THERAPEUTIC EXERCISE: (see below for minutes):  Exercises per grid below to improve mobility, strength, balance and coordination. Required minimal verbal and manual cues to promote proper body alignment, promote proper body posture and promote proper body mechanics. Progressed resistance, range, repetitions and complexity of movement as indicated. MANUAL THERAPY: (see below for minutes): Joint mobilization and Soft tissue mobilization was utilized and necessary because of the patient's restricted joint motion, painful spasm, loss of articular motion and restricted motion of soft tissue. MODALITIES: (see below for minutes):       To decrease pain     Date: 10/15/18 10/17/18 (visit 2) 10/18/18 (visit 3) 10/22/18 (visit 4) 10/24/18 (visit 5) 10/25/18 (visit 6) 10/29/18 (visit 7) 10/31/18 (visit 8)   Modalities:  15 min 15 min 15 min 15 min 15 min 15 min 15 min     gameready x 15 min (vasopneumatic) to assist with edema/swelling management repeat repeat repeat repeat repeat repeat                         Therapeutic Exercise: 15 min 45 min 30 min 40 min 40 min 40 min 40 min 40 min   Bike 5 min 5 min 5 min 5 min 5 min 5 min 5 min 5 min   Heel slides [id number] repeat repeat        Quad set [id number] with heel elevated          SLR 3x10 With NMES x 10 min 10sec on/off queing quad set With NMES 5sec on//off x 10 min 5# weight Repeat 10sec on/off interval with 3 reps/interval 5#  5 min with NMES 10sec on/off 5#  10 min total, 5# with SLR 10sec on/off x 5 min; 15# total with LAQ 5sec on/off x 5 min to 30 ° extension repeat repeat   Hip abduction 3x10 Clamshell 3x15         Stretching Instructed with calf and hamstring stretching for home Manual stretching into knee extension gr 3 3x30 Repeat; prone quad stretch 5x10 sec  Manual stretching into terminal knee extension; prone quad stretch 5 min total repeat Manual stretch into terminal knee extension repeat   FTKE  40x black tband 40x black tband 30x black tband       Walking march  x2 laps     Standing at cable column 25# 3x15    Bridge  With heels on stability ball 3x10         Lateral band walks  Black x 3 laps Black x 3 laps, kick backs 3x10 repeat Black x 3 laps lateral Black x 3 laps lateral  Black x 3 laps Black x 3 laps   Single leg deadlifts   Next visit 3x8 with reach to table    3x8 with reach to table   Single leg stance    5 min total progressing from floor to airex with 3 way reach    Step up to BOSU 20x   Incline board    2 min calf stretch 2x1 min calf stretch repeat repeat 2x1 min calf stretch   Leg press     80# x 10 B; 40# 2x10 U      Step ups     10\" lateral 3x10 to single leg stance 10\" lateral 3x10     Stability ball hamstring curls     3x10 3x10 3x10 3x10   Lunge       Reverse with emmanuel slide 3x10 with manual cueing for frontal plane control repeat              Proprioceptive Activities:                                            Manual Therapy: 10 min  15 min 5 min 5 min 5 min  5 min 5 min   Mobilizations Gr 3 patellar glides, mobs into extension  Soft tissue mobilization and edema management to posterior knee To ITB soft tissue mobilization repeat repeat repeat repeat              Therapeutic Activities:                                                       HEP: see 10/15/18 flow sheet for specifics  [facility] Portal  Treatment/Session Assessment: Demonstrates difficulty with step ups to unstable surface secondary to impaired proprioception through the limb. · Pain/ Symptoms: Initial:   2/10  \"The progress seems like it's going a bit slower. It's feeling good, the knee just feels full. \"  Post Session:  No increase following today's treatment session. ·   Compliance with Program/Exercises: Will assess as treatment progresses. · Recommendations/Intent for next treatment session: \"Next visit will focus on advancements to more challenging activities\".   Total Treatment Duration:  PT Patient Time In/Time Out  Time In: 1445  Time Out: [address]  [address], DPT

## 2018-11-01 ENCOUNTER — HOSPITAL ENCOUNTER (OUTPATIENT)
Dept: PHYSICAL THERAPY | Age: 68
Discharge: HOME | End: 2018-11-01
Payer: COMMERCIAL

## 2018-11-01 PROCEDURE — 97110 THERAPEUTIC EXERCISES: CPT

## 2018-11-01 PROCEDURE — 97140 MANUAL THERAPY 1/> REGIONS: CPT

## 2018-11-01 PROCEDURE — 97016 VASOPNEUMATIC DEVICE THERAPY: CPT

## 2018-11-01 NOTE — PROGRESS NOTES
Mohan Combs  : 1950 [address] [facility]  [address] [place] [address]  [phone]   [phone]        OUTPATIENT PHYSICAL THERAPY:Daily Note 2018         ICD-10: Treatment Diagnosis: Pain in left knee (M25.562); Difficulty in walking, not elsewhere classified (R26.2)  Precautions/Allergies:   Patient has no known allergies. Fall Risk Score: 1 (? 5 = High Risk)  MD Orders: evaluate and treat MEDICAL/REFERRING DIAGNOSIS:  Unilateral primary osteoarthritis, left knee [M17.12]  Chondromalacia patellae, left knee [M22.42]  Other tear of lateral meniscus, current injury, left knee, initial encounter [W88.503C]   DATE OF ONSET: date of surgery: 10/12/18 s/p left knee arthroscopy (please refer to surgical notes for specifics. REFERRING PHYSICIAN: Kane Morrow MD  RETURN PHYSICIAN APPOINTMENT: 10/29/18     INITIAL ASSESSMENT:  Mr. Ector Forbes presents to therapy with decreased joint mobility at the knee into extension and flexion; restricted flexibility of the quadriceps, hamstrings and calf musculature, decreased strength throughout the lower extremity secondary to neuromuscular inhibition, atrophy, and pain. This causes difficulty with weight bearing activities such as walking, getting up from a low chair, going up/down stairs, and recreational activities such as running and playing pickle ball. Skilled therapy is required to return to prior level of function. PROBLEM LIST (Impacting functional limitations):  1. Decreased Strength  2. Decreased ADL/Functional Activities  3. Decreased Ambulation Ability/Technique  4. Decreased Balance  5. Increased Pain  6. Decreased Activity Tolerance  7. Decreased Flexibility/Joint Mobility INTERVENTIONS PLANNED:  1. Balance Exercise  2. Family Education  3. Gait Training  4. Home Exercise Program (HEP)  5. Manual Therapy  6. Neuromuscular Re-education/Strengthening  7.  Range of Motion (ROM)  8. Therapeutic Activites  9. Therapeutic Exercise/Strengthening  10. modalities    TREATMENT PLAN:  Effective Dates: 10/15/2018 TO 1/14/2019 (90 days). Frequency/Duration: 3 times a week for 90 Days  GOALS: (Goals have been discussed and agreed upon with patient.)  Short-Term Functional Goals: Time Frame: 2 weeks  1. Patient will be independent with HEP. 2. Patient will improve active knee extension to 0 ° during stance to restore symmetric joint loading during stance phase of gait. 3. Patient will report pain <2/10 with daily activity. Discharge Goals: Time Frame: 6 weeks  1. Patient will improve knee mobility to 2-0-135 ° to normalize joint mobility for symmetric gait on all surfaces and at all speeds. 2. Patient will report no pain with progression to independent gym routine to return to prior level of function. 3. Patient will improve knee extension strength to 5/5 to restore dynamic stability required for stability during gait at all speeds. Rehabilitation Potential For Stated Goals: Excellent                HISTORY:   History of Present Injury/Illness (Reason for Referral):  Patient presents to therapy with primary complaint of left knee pain, stiffness, and swelling following above surgical procedure. He reports a 2 year history of intermittent knee pain and stiffness associated with running and activities such as going down stairs that was previously managed with conservative methods. He had been able to return to a limited running regimen but over the summer was beginning to have increased stiffness, increased discomfort for longer periods into his run, and increased pain on the days following his run. He was also having more sensitivity with general daily walking activities. He notes no complications with surgery and has only had to take Aleeve on the afternoon following surgery and the morning after surgery. Otherwise he has been icing to manage his discomfort.  He does report some stiffness and soreness this morning that he attributes to returning to work at his standing desk and doing a little bit of walking over the weekend. He is an active individual and would like to return to activities such as running, playing pickleball and hiking. Past Medical History/Comorbidities:   Mr. Chio Romano  has a past medical history of Acute sinusitis, Arthritis, Bilateral impacted cerumen, Chronic sinusitis, Conductive hearing loss, Esophageal reflux, Hearing loss, Hearing loss due to cerumen impaction, Itching of ear, and Left knee pain. Mr. Chio Romano  has a past surgical history that includes hx lumbar laminectomy; hx tonsillectomy; hx wisdom teeth extraction; hx colonoscopy; and LEFT KNEE ARTHROSCOPY/  MEDIAL AND LATERAL MENISCECTOMY (Left, 10/12/2018). Social History/Living Environment:     Patient lives at home with his wife. Prior Level of Function/Work/Activity:  Patient is a . He enjoys running, playing pickleball and hiking. Dominant Side:         RIGHT  Current Medications:    Current Outpatient Medications:     aspirin delayed-release 81 mg tablet, Take 81 mg by mouth daily. Indications: prevention, Disp: , Rfl:     cetirizine (ZYRTEC) 10 mg tablet, Take 10 mg by mouth every other day., Disp: , Rfl:    Date Last Reviewed:  11/1/2018   EXAMINATION:   Observation/Orthostatic Postural Assessment:          Incision sites are closed and covered with steri strips. There appears to be some blistering at the superior portion of the lateral incision. Girth: Calf: 37.5cm L, 38cm R; Mid patella: 40cm L, 38cm R; Mid thigh: 48cm L, 49cm R  Palpation:          Mild tenderness along incision sites.   ROM:     Left knee: 2-120 ° active; 0 to 125 ° passive  Right knee: 1-0-135 °   SLR: 60 ° L; 70 ° R  Hip extension: WNL      Strength:     Quad set is fair  SLR performed with 5 ° extension lag  Knee extension: 4-/5 L; 5/5 R  SLR: 4/5 L; 5/5 R  Hip abduction: 4/5 L; 5/5 R      Special Tests: Not applicable  Neurological Screen:        Light touch sensation is fully intact. Functional Mobility:         Ambulates without assistive device. Ambulates with slight increase in knee flexion during stance phase of gait. Balance:          Maintains single leg stance with eyes open for 5 seconds. Body Structures Involved:  1. Nerves  2. Bones  3. Joints  4. Muscles  5. Ligaments Body Functions Affected:  1. Sensory/Pain  2. Neuromusculoskeletal  3. Movement Related Activities and Participation Affected:  1. Learning and Applying Knowledge  2. General Tasks and Demands  3. Mobility  4. Self Care  5. Domestic Life  6. Interpersonal Interactions and Relationships  7. Community, Social and Civic Life   CLINICAL PRESENTATION:   CLINICAL DECISION MAKING:   Outcome Measure: Tool Used: Lower Extremity Functional Scale (LEFS)  Score:  Initial: 47/80 Most Recent: X/80 (Date: -- )   Interpretation of Score: 20 questions each scored on a 5 point scale with 0 representing \"extreme difficulty or unable to perform\" and 4 representing \"no difficulty\". The lower the score, the greater the functional disability. 80/80 represents no disability. Minimal detectable change is 9 points. Score 80 79-63 62-48 47-32 31-16 15-1 0   Modifier CH CI CJ CK CL CM CN     ? Mobility - Walking and Moving Around:     - CURRENT STATUS: CK - 40%-59% impaired, limited or restricted    - GOAL STATUS: CI - 1%-19% impaired, limited or restricted    - D/C STATUS:  ---------------To be determined---------------    Medical Necessity:   · Patient is expected to demonstrate progress in strength, range of motion, balance and coordination to increase independence with community mobility and return to prior level of function. Reason for Services/Other Comments:  · Patient has demonstrated an improvement in functional level by independent performance of HEP.    TREATMENT:   (In addition to Assessment/Re-Assessment sessions the following treatments were rendered)  THERAPEUTIC EXERCISE: (see below for minutes):  Exercises per grid below to improve mobility, strength, balance and coordination. Required minimal verbal and manual cues to promote proper body alignment, promote proper body posture and promote proper body mechanics. Progressed resistance, range, repetitions and complexity of movement as indicated. MANUAL THERAPY: (see below for minutes): Joint mobilization and Soft tissue mobilization was utilized and necessary because of the patient's restricted joint motion, painful spasm, loss of articular motion and restricted motion of soft tissue. MODALITIES: (see below for minutes):       To decrease pain     Date: 10/15/18 10/17/18 (visit 2) 10/18/18 (visit 3) 10/22/18 (visit 4) 10/24/18 (visit 5) 10/25/18 (visit 6) 10/29/18 (visit 7) 10/31/18 (visit 8) 11/01/18 (visit 9)   Modalities:  15 min 15 min 15 min 15 min 15 min 15 min 15 min 15 min     gameready x 15 min (vasopneumatic) to assist with edema/swelling management repeat repeat repeat repeat repeat repeat repeat                           Therapeutic Exercise: 15 min 45 min 30 min 40 min 40 min 40 min 40 min 40 min 30 min   Bike 5 min 5 min 5 min 5 min 5 min 5 min 5 min 5 min 5 min   Heel slides [id number] repeat repeat         Quad set [id number] with heel elevated           SLR 3x10 With NMES x 10 min 10sec on/off queing quad set With NMES 5sec on//off x 10 min 5# weight Repeat 10sec on/off interval with 3 reps/interval 5#  5 min with NMES 10sec on/off 5#  10 min total, 5# with SLR 10sec on/off x 5 min; 15# total with LAQ 5sec on/off x 5 min to 30 ° extension repeat repeat 10# SLR with 10sec on/off x10 min   Hip abduction 3x10 Clamshell 3x15          Stretching Instructed with calf and hamstring stretching for home Manual stretching into knee extension gr 3 3x30 Repeat; prone quad stretch 5x10 sec  Manual stretching into terminal knee extension; prone quad stretch 5 min total repeat Manual stretch into terminal knee extension repeat Repeat; AIS ITB stretch [id number]   FTKE  40x black tband 40x black tband 30x black tband        Walking march  x2 laps     Standing at cable column 25# 3x15     Bridge  With heels on stability ball 3x10          Lateral band walks  Black x 3 laps Black x 3 laps, kick backs 3x10 repeat Black x 3 laps lateral Black x 3 laps lateral  Black x 3 laps Black x 3 laps Grey x3 laps lateral, x 2 laps fwd   Single leg deadlifts   Next visit 3x8 with reach to table    3x8 with reach to table    Single leg stance    5 min total progressing from floor to airex with 3 way reach    Step up to BOSU 20x    Incline board    2 min calf stretch 2x1 min calf stretch repeat repeat 2x1 min calf stretch 2x1 min calf stretch   Leg press     80# x 10 B; 40# 2x10 U    BOSU squats 2x10   Step ups     10\" lateral 3x10 to single leg stance 10\" lateral 3x10      Stability ball hamstring curls     3x10 3x10 3x10 3x10    Lunge       Reverse with emmanuel slide 3x10 with manual cueing for frontal plane control repeat                Proprioceptive Activities:                                                Manual Therapy: 10 min  15 min 5 min 5 min 5 min  5 min 5 min 15 min   Mobilizations Gr 3 patellar glides, mobs into extension  Soft tissue mobilization and edema management to posterior knee To ITB soft tissue mobilization repeat repeat repeat repeat Soft tissue mobilization to hamstring, posterior knee, calf               Therapeutic Activities:                                                            HEP: see 10/15/18 flow sheet for specifics  [facility] Portal  Treatment/Session Assessment: Mild soft tissue restriction is present through the posterior hamstring and lateral ITB. Advised progressing to elliptical next week as he is progressing well with cycling. · Pain/ Symptoms: Initial:   2/10  \"I just feel really tight in the back of the knee today.  I think my hamstrings are just sore from the strengthening. \"  Post Session:  No increase following today's treatment session. ·   Compliance with Program/Exercises: Will assess as treatment progresses. · Recommendations/Intent for next treatment session: \"Next visit will focus on advancements to more challenging activities\".   Total Treatment Duration:  PT Patient Time In/Time Out  Time In: 1445  Time Out: [address]  [address], DPT

## 2018-11-05 ENCOUNTER — HOSPITAL ENCOUNTER (OUTPATIENT)
Dept: PHYSICAL THERAPY | Age: 68
Discharge: HOME | End: 2018-11-05
Payer: COMMERCIAL

## 2018-11-05 PROCEDURE — G8979 MOBILITY GOAL STATUS: HCPCS

## 2018-11-05 PROCEDURE — 97110 THERAPEUTIC EXERCISES: CPT

## 2018-11-05 PROCEDURE — 97140 MANUAL THERAPY 1/> REGIONS: CPT

## 2018-11-05 PROCEDURE — G8978 MOBILITY CURRENT STATUS: HCPCS

## 2018-11-05 PROCEDURE — 97016 VASOPNEUMATIC DEVICE THERAPY: CPT

## 2018-11-05 NOTE — PROGRESS NOTES
Francine Guerra  : 1950 [address], [address] [place] [address]  [phone]   [phone]        OUTPATIENT PHYSICAL THERAPY:Daily Note and Progress Report 2018         ICD-10: Treatment Diagnosis: Pain in left knee (M25.562); Difficulty in walking, not elsewhere classified (R26.2)  Precautions/Allergies:   Patient has no known allergies. Fall Risk Score: 1 (? 5 = High Risk)  MD Orders: evaluate and treat MEDICAL/REFERRING DIAGNOSIS:  Unilateral primary osteoarthritis, left knee [M17.12]  Chondromalacia patellae, left knee [M22.42]  Other tear of lateral meniscus, current injury, left knee, initial encounter [L07.977M]   DATE OF ONSET: date of surgery: 10/12/18 s/p left knee arthroscopy (please refer to surgical notes for specifics. REFERRING PHYSICIAN: Loulou Mendoza MD  RETURN PHYSICIAN APPOINTMENT: 10/29/18     INITIAL ASSESSMENT:  Mr. Scott Valderrama presents to therapy with decreased joint mobility at the knee into extension and flexion; restricted flexibility of the quadriceps, hamstrings and calf musculature, decreased strength throughout the lower extremity secondary to neuromuscular inhibition, atrophy, and pain. This causes difficulty with weight bearing activities such as walking, getting up from a low chair, going up/down stairs, and recreational activities such as running and playing pickle ball. Skilled therapy is required to return to prior level of function. PROBLEM LIST (Impacting functional limitations):  1. Decreased Strength  2. Decreased ADL/Functional Activities  3. Decreased Ambulation Ability/Technique  4. Decreased Balance  5. Increased Pain  6. Decreased Activity Tolerance  7. Decreased Flexibility/Joint Mobility INTERVENTIONS PLANNED:  1. Balance Exercise  2. Family Education  3. Gait Training  4. Home Exercise Program (HEP)  5. Manual Therapy  6. Neuromuscular Re-education/Strengthening  7.  Range of Motion (ROM)  8. Therapeutic Activites  9. Therapeutic Exercise/Strengthening  10. modalities    TREATMENT PLAN:  Effective Dates: 10/15/2018 TO 1/14/2019 (90 days). Frequency/Duration: 3 times a week for 90 Days  GOALS: (Goals have been discussed and agreed upon with patient.)  Short-Term Functional Goals: Time Frame: 2 weeks MET  1. Patient will be independent with HEP. 2. Patient will improve active knee extension to 0 ° during stance to restore symmetric joint loading during stance phase of gait. 3. Patient will report pain <2/10 with daily activity. Discharge Goals: Time Frame: 6 weeks  1. Patient will improve knee mobility to 2-0-135 ° to normalize joint mobility for symmetric gait on all surfaces and at all speeds. MET  2. Patient will report no pain with progression to independent gym routine to return to prior level of function. PROGRESSING  3. Patient will improve knee extension strength to 5/5 to restore dynamic stability required for stability during gait at all speeds. PROGRESSING  Rehabilitation Potential For Stated Goals: Excellent                HISTORY:   History of Present Injury/Illness (Reason for Referral):  Patient presents to therapy with primary complaint of left knee pain, stiffness, and swelling following above surgical procedure. He reports a 2 year history of intermittent knee pain and stiffness associated with running and activities such as going down stairs that was previously managed with conservative methods. He had been able to return to a limited running regimen but over the summer was beginning to have increased stiffness, increased discomfort for longer periods into his run, and increased pain on the days following his run. He was also having more sensitivity with general daily walking activities. He notes no complications with surgery and has only had to take Aleeve on the afternoon following surgery and the morning after surgery.  Otherwise he has been icing to manage his discomfort. He does report some stiffness and soreness this morning that he attributes to returning to work at his standing desk and doing a little bit of walking over the weekend. He is an active individual and would like to return to activities such as running, playing pickleball and hiking. Past Medical History/Comorbidities:   Mr. Alicia Peace  has a past medical history of Acute sinusitis, Arthritis, Bilateral impacted cerumen, Chronic sinusitis, Conductive hearing loss, Esophageal reflux, Hearing loss, Hearing loss due to cerumen impaction, Itching of ear, and Left knee pain. Mr. Alicia Peace  has a past surgical history that includes hx lumbar laminectomy; hx tonsillectomy; hx wisdom teeth extraction; hx colonoscopy; and LEFT KNEE ARTHROSCOPY/  MEDIAL AND LATERAL MENISCECTOMY (Left, 10/12/2018). Social History/Living Environment:     Patient lives at home with his wife. Prior Level of Function/Work/Activity:  Patient is a . He enjoys running, playing pickleball and hiking. Dominant Side:         RIGHT  Current Medications:    Current Outpatient Medications:     aspirin delayed-release 81 mg tablet, Take 81 mg by mouth daily. Indications: prevention, Disp: , Rfl:     cetirizine (ZYRTEC) 10 mg tablet, Take 10 mg by mouth every other day., Disp: , Rfl:    Date Last Reviewed:  11/5/2018   EXAMINATION:   Observation/Orthostatic Postural Assessment:          Incision sites are closed and covered with steri strips. There appears to be some blistering at the superior portion of the lateral incision. Girth: Calf: 37.5cm L, 38cm R; Mid patella: 40cm L, 38cm R; Mid thigh: 48cm L, 49cm R  Palpation:          Mild tenderness along incision sites. ROM:     Left knee: 2-120 ° active; 0 to 125 ° passive  Right knee: 1-0-135 °   SLR: 60 ° L; 70 ° R  Hip extension: WNL   11/05/18 update:   Joint mobility is symmetric with uninvolved.     Strength:     Quad set is fair  SLR performed with 5 ° extension lag  Knee extension: 4-/5 L; 5/5 R  SLR: 4/5 L; 5/5 R  Hip abduction: 4/5 L; 5/5 R   11/05/18 update:   Knee extension: 4/5 L  SLR: 4+/5 L  Hip abduction: 4+/5 L   Special Tests:          Not applicable  Neurological Screen:        Light touch sensation is fully intact. Functional Mobility:         Ambulates without assistive device. Ambulates with slight increase in knee flexion during stance phase of gait. Balance:          Maintains single leg stance with eyes open for 5 seconds. Body Structures Involved:  1. Nerves  2. Bones  3. Joints  4. Muscles  5. Ligaments Body Functions Affected:  1. Sensory/Pain  2. Neuromusculoskeletal  3. Movement Related Activities and Participation Affected:  1. Learning and Applying Knowledge  2. General Tasks and Demands  3. Mobility  4. Self Care  5. Domestic Life  6. Interpersonal Interactions and Relationships  7. Community, Social and Civic Life   CLINICAL PRESENTATION:   CLINICAL DECISION MAKING:   Outcome Measure: Tool Used: Lower Extremity Functional Scale (LEFS)  Score:  Initial: 47/80 Most Recent:  Per patient report notes 30% impairments with daily activity. (11/05/18)   Interpretation of Score: 20 questions each scored on a 5 point scale with 0 representing \"extreme difficulty or unable to perform\" and 4 representing \"no difficulty\". The lower the score, the greater the functional disability. 80/80 represents no disability. Minimal detectable change is 9 points. Score 80 79-63 62-48 47-32 31-16 15-1 0   Modifier CH CI CJ CK CL CM CN     ?  Mobility - Walking and Moving Around:     - CURRENT STATUS: CJ - 20%-39% impaired, limited or restricted    - GOAL STATUS: CI - 1%-19% impaired, limited or restricted    - D/C STATUS:  ---------------To be determined---------------    Medical Necessity:   · Patient is expected to demonstrate progress in strength, range of motion, balance and coordination to increase independence with community mobility and return to prior level of function. Reason for Services/Other Comments:  · Patient has demonstrated an improvement in functional level by independent performance of HEP. TREATMENT:   (In addition to Assessment/Re-Assessment sessions the following treatments were rendered)  THERAPEUTIC EXERCISE: (see below for minutes):  Exercises per grid below to improve mobility, strength, balance and coordination. Required minimal verbal and manual cues to promote proper body alignment, promote proper body posture and promote proper body mechanics. Progressed resistance, range, repetitions and complexity of movement as indicated. MANUAL THERAPY: (see below for minutes): Joint mobilization and Soft tissue mobilization was utilized and necessary because of the patient's restricted joint motion, painful spasm, loss of articular motion and restricted motion of soft tissue. MODALITIES: (see below for minutes):       To decrease pain     Date: 10/15/18 10/17/18 (visit 2) 10/18/18 (visit 3) 10/22/18 (visit 4) 10/24/18 (visit 5) 10/25/18 (visit 6) 10/29/18 (visit 7) 10/31/18 (visit 8) 11/01/18 (visit 9) 11/05/18 (visit 10)   Modalities:  15 min 15 min 15 min 15 min 15 min 15 min 15 min 15 min 15 min      gameready x 15 min (vasopneumatic) to assist with edema/swelling management repeat repeat repeat repeat repeat repeat repeat repeat                             Therapeutic Exercise: 15 min 45 min 30 min 40 min 40 min 40 min 40 min 40 min 30 min 35 min   Bike 5 min 5 min 5 min 5 min 5 min 5 min 5 min 5 min 5 min    Heel slides [id number] repeat repeat          Quad set [id number] with heel elevated            SLR 3x10 With NMES x 10 min 10sec on/off queing quad set With NMES 5sec on//off x 10 min 5# weight Repeat 10sec on/off interval with 3 reps/interval 5#  5 min with NMES 10sec on/off 5#  10 min total, 5# with SLR 10sec on/off x 5 min; 15# total with LAQ 5sec on/off x 5 min to 30 ° extension repeat repeat 10# SLR with 10sec on/off x10 min 10# 10sec on/off x5 min; LAQ 20# 5sec on/off x 5 min   Hip abduction 3x10 Clamshell 3x15           Stretching Instructed with calf and hamstring stretching for home Manual stretching into knee extension gr 3 3x30 Repeat; prone quad stretch 5x10 sec  Manual stretching into terminal knee extension; prone quad stretch 5 min total repeat Manual stretch into terminal knee extension repeat Repeat; AIS ITB stretch [id number] repeat   FTKE  40x black tband 40x black tband 30x black tband         Walking march  x2 laps     Standing at cable column 25# 3x15      Bridge  With heels on stability ball 3x10           Lateral band walks  Black x 3 laps Black x 3 laps, kick backs 3x10 repeat Black x 3 laps lateral Black x 3 laps lateral  Black x 3 laps Black x 3 laps Grey x3 laps lateral, x 2 laps fwd Kick backs blue 3x15   Single leg deadlifts   Next visit 3x8 with reach to table    3x8 with reach to table     Single leg stance    5 min total progressing from floor to airex with 3 way reach    Step up to BOSU 20x     Incline board    2 min calf stretch 2x1 min calf stretch repeat repeat 2x1 min calf stretch 2x1 min calf stretch repeat   Leg press     80# x 10 B; 40# 2x10 U    BOSU squats 2x10 BOSU squat 3x10   Step ups     10\" lateral 3x10 to single leg stance 10\" lateral 3x10    Step back from 6\" step to single leg stance 3x15   Stability ball hamstring curls     3x10 3x10 3x10 3x10     Lunge       Reverse with tootie slide 3x10 with manual cueing for frontal plane control repeat  Tootie slide 3x15                Proprioceptive Activities:                                                    Manual Therapy: 10 min  15 min 5 min 5 min 5 min  5 min 5 min 15 min 10 min   Mobilizations Gr 3 patellar glides, mobs into extension  Soft tissue mobilization and edema management to posterior knee To ITB soft tissue mobilization repeat repeat repeat repeat Soft tissue mobilization to hamstring, posterior knee, calf To hamstring and posterior ITB Therapeutic Activities:                                                                 HEP: see 10/15/18 flow sheet for specifics  [employer] [facility]  Treatment/Session Assessment: Continues to demonstrate difficulty with single leg stance tasks due to impaired proprioception. Able to control increased depth and pain free movement with BOSU squat. · Pain/ Symptoms: Initial:   2/10  \"I did about 10 minutes on the elliptical earlier today and it didn't feel sore at all. \" Post Session:  No increase following today's treatment session. ·   Compliance with Program/Exercises: Will assess as treatment progresses. · Recommendations/Intent for next treatment session: \"Next visit will focus on advancements to more challenging activities\".   Total Treatment Duration:  PT Patient Time In/Time Out  Time In: 1400  Time Out: Via Jessie Cary, DPT

## 2018-11-07 ENCOUNTER — HOSPITAL ENCOUNTER (OUTPATIENT)
Dept: PHYSICAL THERAPY | Age: 68
Discharge: HOME | End: 2018-11-07
Payer: COMMERCIAL

## 2018-11-07 PROCEDURE — 97140 MANUAL THERAPY 1/> REGIONS: CPT

## 2018-11-07 PROCEDURE — 97014 ELECTRIC STIMULATION THERAPY: CPT

## 2018-11-07 PROCEDURE — 97110 THERAPEUTIC EXERCISES: CPT

## 2018-11-07 NOTE — PROGRESS NOTES
Anai Osmin  : 1950 [id number] [facility],[address]  [address] [place] [address].  [phone]   [phone]        OUTPATIENT PHYSICAL THERAPY:Daily Note 2018         ICD-10: Treatment Diagnosis: Pain in left knee (M25.562); Difficulty in walking, not elsewhere classified (R26.2)  Precautions/Allergies:   Patient has no known allergies. Fall Risk Score: 1 (? 5 = High Risk)  MD Orders: evaluate and treat MEDICAL/REFERRING DIAGNOSIS:  Unilateral primary osteoarthritis, left knee [M17.12]  Chondromalacia patellae, left knee [M22.42]  Other tear of lateral meniscus, current injury, left knee, initial encounter [[id number]]   DATE OF ONSET: date of surgery: 10/12/18 s/p left knee arthroscopy (please refer to surgical notes for specifics. REFERRING PHYSICIAN: Jnony Salas MD  RETURN PHYSICIAN APPOINTMENT: 10/29/18     INITIAL ASSESSMENT:  Mr. Mirna Pierre presents to therapy with decreased joint mobility at the knee into extension and flexion; restricted flexibility of the quadriceps, hamstrings and calf musculature, decreased strength throughout the lower extremity secondary to neuromuscular inhibition, atrophy, and pain. This causes difficulty with weight bearing activities such as walking, getting up from a low chair, going up/down stairs, and recreational activities such as running and playing pickle ball. Skilled therapy is required to return to prior level of function. PROBLEM LIST (Impacting functional limitations):  1. Decreased Strength  2. Decreased ADL/Functional Activities  3. Decreased Ambulation Ability/Technique  4. Decreased Balance  5. Increased Pain  6. Decreased Activity Tolerance  7. Decreased Flexibility/Joint Mobility INTERVENTIONS PLANNED:  1. Balance Exercise  2. Family Education  3. Gait Training  4. Home Exercise Program (HEP)  5. Manual Therapy  6. Neuromuscular Re-education/Strengthening  7.  Range of Motion (ROM)  8. Therapeutic Activites  9. Therapeutic Exercise/Strengthening  10. modalities    TREATMENT PLAN:  Effective Dates: 10/15/2018 TO 1/14/2019 (90 days). Frequency/Duration: 3 times a week for 90 Days  GOALS: (Goals have been discussed and agreed upon with patient.)  Short-Term Functional Goals: Time Frame: 2 weeks MET  1. Patient will be independent with HEP. 2. Patient will improve active knee extension to 0 ° during stance to restore symmetric joint loading during stance phase of gait. 3. Patient will report pain <2/10 with daily activity. Discharge Goals: Time Frame: 6 weeks  1. Patient will improve knee mobility to 2-0-135 ° to normalize joint mobility for symmetric gait on all surfaces and at all speeds. MET  2. Patient will report no pain with progression to independent gym routine to return to prior level of function. PROGRESSING  3. Patient will improve knee extension strength to 5/5 to restore dynamic stability required for stability during gait at all speeds. PROGRESSING  Rehabilitation Potential For Stated Goals: Excellent                HISTORY:   History of Present Injury/Illness (Reason for Referral):  Patient presents to therapy with primary complaint of left knee pain, stiffness, and swelling following above surgical procedure. He reports a 2 year history of intermittent knee pain and stiffness associated with running and activities such as going down stairs that was previously managed with conservative methods. He had been able to return to a limited running regimen but over the summer was beginning to have increased stiffness, increased discomfort for longer periods into his run, and increased pain on the days following his run. He was also having more sensitivity with general daily walking activities. He notes no complications with surgery and has only had to take Aleeve on the afternoon following surgery and the morning after surgery.  Otherwise he has been icing to manage his discomfort. He does report some stiffness and soreness this morning that he attributes to returning to work at his standing desk and doing a little bit of walking over the weekend. He is an active individual and would like to return to activities such as running, playing pickleball and hiking. Past Medical History/Comorbidities:   Mr. Chio Romano  has a past medical history of Acute sinusitis, Arthritis, Bilateral impacted cerumen, Chronic sinusitis, Conductive hearing loss, Esophageal reflux, Hearing loss, Hearing loss due to cerumen impaction, Itching of ear, and Left knee pain. Mr. Chio Romano  has a past surgical history that includes hx lumbar laminectomy; hx tonsillectomy; hx wisdom teeth extraction; hx colonoscopy; and LEFT KNEE ARTHROSCOPY/  MEDIAL AND LATERAL MENISCECTOMY (Left, 10/12/2018). Social History/Living Environment:     Patient lives at home with his wife. Prior Level of Function/Work/Activity:  Patient is a . He enjoys running, playing pickleball and hiking. Dominant Side:         RIGHT  Current Medications:    Current Outpatient Medications:     aspirin delayed-release 81 mg tablet, Take 81 mg by mouth daily. Indications: prevention, Disp: , Rfl:     cetirizine (ZYRTEC) 10 mg tablet, Take 10 mg by mouth every other day., Disp: , Rfl:    Date Last Reviewed:  11/7/2018   EXAMINATION:   Observation/Orthostatic Postural Assessment:          Incision sites are closed and covered with steri strips. There appears to be some blistering at the superior portion of the lateral incision. Girth: Calf: 37.5cm L, 38cm R; Mid patella: 40cm L, 38cm R; Mid thigh: 48cm L, 49cm R  Palpation:          Mild tenderness along incision sites. ROM:     Left knee: 2-120 ° active; 0 to 125 ° passive  Right knee: 1-0-135 °   SLR: 60 ° L; 70 ° R  Hip extension: WNL   11/05/18 update:   Joint mobility is symmetric with uninvolved.     Strength:     Quad set is fair  SLR performed with 5 ° extension lag  Knee extension: 4-/5 L; 5/5 R  SLR: 4/5 L; 5/5 R  Hip abduction: 4/5 L; 5/5 R   11/05/18 update:   Knee extension: 4/5 L  SLR: 4+/5 L  Hip abduction: 4+/5 L   Special Tests:          Not applicable  Neurological Screen:        Light touch sensation is fully intact. Functional Mobility:         Ambulates without assistive device. Ambulates with slight increase in knee flexion during stance phase of gait. Balance:          Maintains single leg stance with eyes open for 5 seconds. Body Structures Involved:  1. Nerves  2. Bones  3. Joints  4. Muscles  5. Ligaments Body Functions Affected:  1. Sensory/Pain  2. Neuromusculoskeletal  3. Movement Related Activities and Participation Affected:  1. Learning and Applying Knowledge  2. General Tasks and Demands  3. Mobility  4. Self Care  5. Domestic Life  6. Interpersonal Interactions and Relationships  7. Community, Social and Civic Life   CLINICAL PRESENTATION:   CLINICAL DECISION MAKING:   Outcome Measure: Tool Used: Lower Extremity Functional Scale (LEFS)  Score:  Initial: 47/80 Most Recent:  Per patient report notes 30% impairments with daily activity. (11/05/18)   Interpretation of Score: 20 questions each scored on a 5 point scale with 0 representing \"extreme difficulty or unable to perform\" and 4 representing \"no difficulty\". The lower the score, the greater the functional disability. 80/80 represents no disability. Minimal detectable change is 9 points. Score 80 79-63 62-48 47-32 31-16 15-1 0   Modifier CH CI CJ CK CL CM CN     ?  Mobility - Walking and Moving Around:     - CURRENT STATUS: CJ - 20%-39% impaired, limited or restricted    - GOAL STATUS: CI - 1%-19% impaired, limited or restricted    - D/C STATUS:  ---------------To be determined---------------    Medical Necessity:   · Patient is expected to demonstrate progress in strength, range of motion, balance and coordination to increase independence with community mobility and return to prior level of function. Reason for Services/Other Comments:  · Patient has demonstrated an improvement in functional level by independent performance of HEP. TREATMENT:   (In addition to Assessment/Re-Assessment sessions the following treatments were rendered)  THERAPEUTIC EXERCISE: (see below for minutes):  Exercises per grid below to improve mobility, strength, balance and coordination. Required minimal verbal and manual cues to promote proper body alignment, promote proper body posture and promote proper body mechanics. Progressed resistance, range, repetitions and complexity of movement as indicated. MANUAL THERAPY: (see below for minutes): Joint mobilization and Soft tissue mobilization was utilized and necessary because of the patient's restricted joint motion, painful spasm, loss of articular motion and restricted motion of soft tissue. MODALITIES: (see below for minutes):       To decrease pain     Date: 10/15/18 10/17/18 (visit 2) 10/18/18 (visit 3) 10/22/18 (visit 4) 10/24/18 (visit 5) 10/25/18 (visit 6) 10/29/18 (visit 7) 10/31/18 (visit 8) 11/01/18 (visit 9) 11/05/18 (visit 10) 11/07/18 (visit 11)   Modalities:  15 min 15 min 15 min 15 min 15 min 15 min 15 min 15 min 15 min  15 min      gameready x 15 min (vasopneumatic) to assist with edema/swelling management repeat repeat repeat repeat repeat repeat repeat repeat repeat                               Therapeutic Exercise: 15 min 45 min 30 min 40 min 40 min 40 min 40 min 40 min 30 min 35 min 35 min   Bike 5 min 5 min 5 min 5 min 5 min 5 min 5 min 5 min 5 min  5 min   Heel slides [id number] repeat repeat           Quad set [id number] with heel elevated             SLR 3x10 With NMES x 10 min 10sec on/off queing quad set With NMES 5sec on//off x 10 min 5# weight Repeat 10sec on/off interval with 3 reps/interval 5#  5 min with NMES 10sec on/off 5#  10 min total, 5# with SLR 10sec on/off x 5 min; 15# total with LAQ 5sec on/off x 5 min to 30 ° extension repeat repeat 10# SLR with 10sec on/off x10 min 10# 10sec on/off x5 min; LAQ 20# 5sec on/off x 5 min repeat   Hip abduction 3x10 Clamshell 3x15            Stretching Instructed with calf and hamstring stretching for home Manual stretching into knee extension gr 3 3x30 Repeat; prone quad stretch 5x10 sec  Manual stretching into terminal knee extension; prone quad stretch 5 min total repeat Manual stretch into terminal knee extension repeat Repeat; AIS ITB stretch [id number] repeat    FTKE  40x black tband 40x black tband 30x black tband          Walking march  x2 laps     Standing at cable column 25# 3x15       Bridge  With heels on stability ball 3x10            Lateral band walks  Black x 3 laps Black x 3 laps, kick backs 3x10 repeat Black x 3 laps lateral Black x 3 laps lateral  Black x 3 laps Black x 3 laps Grey x3 laps lateral, x 2 laps fwd Kick backs blue 3x15 repeat   Single leg deadlifts   Next visit 3x8 with reach to table    3x8 with reach to table      Single leg stance    5 min total progressing from floor to airex with 3 way reach    Step up to BOSU 20x      Incline board    2 min calf stretch 2x1 min calf stretch repeat repeat 2x1 min calf stretch 2x1 min calf stretch repeat 2x1 min calf stretch   Leg press     80# x 10 B; 40# 2x10 U    BOSU squats 2x10 BOSU squat 3x10 BOSU squat 3x10   Step ups     10\" lateral 3x10 to single leg stance 10\" lateral 3x10    Step back from 6\" step to single leg stance 3x15    Stability ball hamstring curls     3x10 3x10 3x10 3x10      Lunge       Reverse with tootie slide 3x10 with manual cueing for frontal plane control repeat  Tootie slide 3x15 Tootie slide with med ball rotation 3x8                 Proprioceptive Activities:                                                        Manual Therapy: 10 min  15 min 5 min 5 min 5 min  5 min 5 min 15 min 10 min 10 min   Mobilizations Gr 3 patellar glides, mobs into extension  Soft tissue mobilization and edema management to posterior knee To ITB soft tissue mobilization repeat repeat repeat repeat Soft tissue mobilization to hamstring, posterior knee, calf To hamstring and posterior ITB repeat                 Therapeutic Activities:                                                                      HEP: see 10/15/18 flow sheet for specifics  [facility]  Treatment/Session Assessment: Discussed adding step ups to gym routine along with BOSU squat. Increased joint irritability secondary to increased level of activity but advised regarding activity modification and I anticipate this will decrease with maintaining current level of volume before progressing when symptoms ease. · Pain/ Symptoms: Initial:   2/10  \"It seemed to be a little more sore this morning, but I've been able to do about 15 minutes on the AMT. \" Post Session:  No increase following today's treatment session. ·   Compliance with Program/Exercises: Will assess as treatment progresses. · Recommendations/Intent for next treatment session: \"Next visit will focus on advancements to more challenging activities\".   Total Treatment Duration:  PT Patient Time In/Time Out  Time In: 1530  Time Out: FIORDALIZA Lara

## 2018-11-12 ENCOUNTER — HOSPITAL ENCOUNTER (OUTPATIENT)
Dept: PHYSICAL THERAPY | Age: 68
Discharge: HOME | End: 2018-11-12
Payer: COMMERCIAL

## 2018-11-12 PROCEDURE — 97110 THERAPEUTIC EXERCISES: CPT

## 2018-11-12 PROCEDURE — 97140 MANUAL THERAPY 1/> REGIONS: CPT

## 2018-11-12 PROCEDURE — 97014 ELECTRIC STIMULATION THERAPY: CPT

## 2018-11-12 NOTE — PROGRESS NOTES
Domingo Veloz  : 1950 [id number] [facility],[address]  [address] [place] [address]  [phone]   [phone]        OUTPATIENT PHYSICAL THERAPY:Daily Note 2018         ICD-10: Treatment Diagnosis: Pain in left knee (M25.562); Difficulty in walking, not elsewhere classified (R26.2)  Precautions/Allergies:   Patient has no known allergies. Fall Risk Score: 1 (? 5 = High Risk)  MD Orders: evaluate and treat MEDICAL/REFERRING DIAGNOSIS:  Unilateral primary osteoarthritis, left knee [M17.12]  Chondromalacia patellae, left knee [M22.42]  Other tear of lateral meniscus, current injury, left knee, initial encounter [U63.826E]   DATE OF ONSET: date of surgery: 10/12/18 s/p left knee arthroscopy (please refer to surgical notes for specifics. REFERRING PHYSICIAN: Kamini Camarena MD  RETURN PHYSICIAN APPOINTMENT: 10/29/18     INITIAL ASSESSMENT:  Mr. Dionna Noriega presents to therapy with decreased joint mobility at the knee into extension and flexion; restricted flexibility of the quadriceps, hamstrings and calf musculature, decreased strength throughout the lower extremity secondary to neuromuscular inhibition, atrophy, and pain. This causes difficulty with weight bearing activities such as walking, getting up from a low chair, going up/down stairs, and recreational activities such as running and playing pickle ball. Skilled therapy is required to return to prior level of function. PROBLEM LIST (Impacting functional limitations):  1. Decreased Strength  2. Decreased ADL/Functional Activities  3. Decreased Ambulation Ability/Technique  4. Decreased Balance  5. Increased Pain  6. Decreased Activity Tolerance  7. Decreased Flexibility/Joint Mobility INTERVENTIONS PLANNED:  1. Balance Exercise  2. Family Education  3. Gait Training  4. Home Exercise Program (HEP)  5. Manual Therapy  6. Neuromuscular Re-education/Strengthening  7.  Range of Motion (ROM)  8. Therapeutic Activites  9. Therapeutic Exercise/Strengthening  10. modalities    TREATMENT PLAN:  Effective Dates: 10/15/2018 TO 1/14/2019 (90 days). Frequency/Duration: 3 times a week for 90 Days  GOALS: (Goals have been discussed and agreed upon with patient.)  Short-Term Functional Goals: Time Frame: 2 weeks MET  1. Patient will be independent with HEP. 2. Patient will improve active knee extension to 0 ° during stance to restore symmetric joint loading during stance phase of gait. 3. Patient will report pain <2/10 with daily activity. Discharge Goals: Time Frame: 6 weeks  1. Patient will improve knee mobility to 2-0-135 ° to normalize joint mobility for symmetric gait on all surfaces and at all speeds. MET  2. Patient will report no pain with progression to independent gym routine to return to prior level of function. PROGRESSING  3. Patient will improve knee extension strength to 5/5 to restore dynamic stability required for stability during gait at all speeds. PROGRESSING  Rehabilitation Potential For Stated Goals: Excellent                HISTORY:   History of Present Injury/Illness (Reason for Referral):  Patient presents to therapy with primary complaint of left knee pain, stiffness, and swelling following above surgical procedure. He reports a 2 year history of intermittent knee pain and stiffness associated with running and activities such as going down stairs that was previously managed with conservative methods. He had been able to return to a limited running regimen but over the summer was beginning to have increased stiffness, increased discomfort for longer periods into his run, and increased pain on the days following his run. He was also having more sensitivity with general daily walking activities. He notes no complications with surgery and has only had to take Aleeve on the afternoon following surgery and the morning after surgery.  Otherwise he has been icing to manage his discomfort. He does report some stiffness and soreness this morning that he attributes to returning to work at his standing desk and doing a little bit of walking over the weekend. He is an active individual and would like to return to activities such as running, playing pickleball and hiking. Past Medical History/Comorbidities:   Mr. Katie Portillo  has a past medical history of Acute sinusitis, Arthritis, Bilateral impacted cerumen, Chronic sinusitis, Conductive hearing loss, Esophageal reflux, Hearing loss, Hearing loss due to cerumen impaction, Itching of ear, and Left knee pain. Mr. Katie Portillo  has a past surgical history that includes hx lumbar laminectomy; hx tonsillectomy; hx wisdom teeth extraction; hx colonoscopy; and LEFT KNEE ARTHROSCOPY/  MEDIAL AND LATERAL MENISCECTOMY (Left, 10/12/2018). Social History/Living Environment:     Patient lives at home with his wife. Prior Level of Function/Work/Activity:  Patient is a . He enjoys running, playing pickleball and hiking. Dominant Side:         RIGHT  Current Medications:    Current Outpatient Medications:     aspirin delayed-release 81 mg tablet, Take 81 mg by mouth daily. Indications: prevention, Disp: , Rfl:     cetirizine (ZYRTEC) 10 mg tablet, Take 10 mg by mouth every other day., Disp: , Rfl:    Date Last Reviewed:  11/12/2018   EXAMINATION:   Observation/Orthostatic Postural Assessment:          Incision sites are closed and covered with steri strips. There appears to be some blistering at the superior portion of the lateral incision. Girth: Calf: 37.5cm L, 38cm R; Mid patella: 40cm L, 38cm R; Mid thigh: 48cm L, 49cm R  Palpation:          Mild tenderness along incision sites. ROM:     Left knee: 2-120 ° active; 0 to 125 ° passive  Right knee: 1-0-135 °   SLR: 60 ° L; 70 ° R  Hip extension: WNL   11/05/18 update:   Joint mobility is symmetric with uninvolved.     Strength:     Quad set is fair  SLR performed with 5 ° extension lag  Knee extension: 4-/5 L; 5/5 R  SLR: 4/5 L; 5/5 R  Hip abduction: 4/5 L; 5/5 R   11/05/18 update:   Knee extension: 4/5 L  SLR: 4+/5 L  Hip abduction: 4+/5 L   Special Tests:          Not applicable  Neurological Screen:        Light touch sensation is fully intact. Functional Mobility:         Ambulates without assistive device. Ambulates with slight increase in knee flexion during stance phase of gait. Balance:          Maintains single leg stance with eyes open for 5 seconds. Body Structures Involved:  1. Nerves  2. Bones  3. Joints  4. Muscles  5. Ligaments Body Functions Affected:  1. Sensory/Pain  2. Neuromusculoskeletal  3. Movement Related Activities and Participation Affected:  1. Learning and Applying Knowledge  2. General Tasks and Demands  3. Mobility  4. Self Care  5. Domestic Life  6. Interpersonal Interactions and Relationships  7. Community, Social and Civic Life   CLINICAL PRESENTATION:   CLINICAL DECISION MAKING:   Outcome Measure: Tool Used: Lower Extremity Functional Scale (LEFS)  Score:  Initial: 47/80 Most Recent:  Per patient report notes 30% impairments with daily activity. (11/05/18)   Interpretation of Score: 20 questions each scored on a 5 point scale with 0 representing \"extreme difficulty or unable to perform\" and 4 representing \"no difficulty\". The lower the score, the greater the functional disability. 80/80 represents no disability. Minimal detectable change is 9 points. Score 80 79-63 62-48 47-32 31-16 15-1 0   Modifier CH CI CJ CK CL CM CN     ?  Mobility - Walking and Moving Around:     - CURRENT STATUS: CJ - 20%-39% impaired, limited or restricted    - GOAL STATUS: CI - 1%-19% impaired, limited or restricted    - D/C STATUS:  ---------------To be determined---------------    Medical Necessity:   · Patient is expected to demonstrate progress in strength, range of motion, balance and coordination to increase independence with community mobility and return to prior level of function. Reason for Services/Other Comments:  · Patient has demonstrated an improvement in functional level by independent performance of HEP. TREATMENT:   (In addition to Assessment/Re-Assessment sessions the following treatments were rendered)  THERAPEUTIC EXERCISE: (see below for minutes):  Exercises per grid below to improve mobility, strength, balance and coordination. Required minimal verbal and manual cues to promote proper body alignment, promote proper body posture and promote proper body mechanics. Progressed resistance, range, repetitions and complexity of movement as indicated. MANUAL THERAPY: (see below for minutes): Joint mobilization and Soft tissue mobilization was utilized and necessary because of the patient's restricted joint motion, painful spasm, loss of articular motion and restricted motion of soft tissue. MODALITIES: (see below for minutes):       To decrease pain     Date: 10/29/18 (visit 7) 10/31/18 (visit 8) 11/01/18 (visit 9) 11/05/18 (visit 10) progress note 11/07/18 (visit 11) 11/12/18 (visit 12)     Modalities: 15 min 15 min 15 min 15 min  15 min  15 min      repeat repeat repeat repeat repeat gameready vasopneumatic compression x 15 min to decrease swelling and pain                           Therapeutic Exercise: 40 min 40 min 30 min 35 min 35 min 35 min     Bike 5 min 5 min 5 min  5 min 5 min     Heel slides           Quad set           SLR repeat repeat 10# SLR with 10sec on/off x10 min 10# 10sec on/off x5 min; LAQ 20# 5sec on/off x 5 min repeat repeat     Hip abduction           Stretching Manual stretch into terminal knee extension repeat Repeat; AIS ITB stretch [id number] repeat  Prone quad stretch with RLE on floor [id number]     FTKE           Walking march Standing at cable column 25# 3x15          Bridge           Lateral band walks Black x 3 laps Black x 3 laps Grey x3 laps lateral, x 2 laps fwd Kick backs blue 3x15 repeat Green loop x4 laps lateral; kick backs 3x12 B     Single leg deadlifts  3x8 with reach to table         Single leg stance  Step up to BOSU 20x         Incline board repeat 2x1 min calf stretch 2x1 min calf stretch repeat 2x1 min calf stretch repeat     Leg press   BOSU squats 2x10 BOSU squat 3x10 BOSU squat 3x10      Step ups    Step back from 6\" step to single leg stance 3x15  Single leg squat from step 10\" 2x8     Stability ball hamstring curls 3x10 3x10         Lunge Reverse with tootie slide 3x10 with manual cueing for frontal plane control repeat  Tootie slide 3x15 Tootie slide with med ball rotation 3x8 repeat                Proprioceptive Activities:                                            Manual Therapy: 5 min 5 min 15 min 10 min 10 min 10 min     Mobilizations repeat repeat Soft tissue mobilization to hamstring, posterior knee, calf To hamstring and posterior ITB repeat repeat                Therapeutic Activities:                                                       HEP: see 10/15/18 flow sheet for specifics  [facility] Portal  Treatment/Session Assessment: Able to perform reverse lunge comfortable. Able to initially perform single leg squat from step pain free but this becomes irritable towards the end of the second set. · Pain/ Symptoms: Initial:   2/10  \"Patient rested on Friday which significantly reduced his stiffness. \" Post Session:  No increase following today's treatment session. ·   Compliance with Program/Exercises: Will assess as treatment progresses. · Recommendations/Intent for next treatment session: \"Next visit will focus on advancements to more challenging activities\".   Total Treatment Duration:  PT Patient Time In/Time Out  Time In: 0845  Time Out: Warner Clark DPT

## 2018-11-14 ENCOUNTER — HOSPITAL ENCOUNTER (OUTPATIENT)
Dept: PHYSICAL THERAPY | Age: 68
Discharge: HOME | End: 2018-11-14
Payer: COMMERCIAL

## 2018-11-14 PROCEDURE — 97016 VASOPNEUMATIC DEVICE THERAPY: CPT

## 2018-11-14 PROCEDURE — 97110 THERAPEUTIC EXERCISES: CPT

## 2018-11-14 PROCEDURE — 97140 MANUAL THERAPY 1/> REGIONS: CPT

## 2018-11-14 NOTE — PROGRESS NOTES
[facility]  : 1950 [address] [place] [address].  [phone]   [phone]        OUTPATIENT PHYSICAL THERAPY:Daily Note 2018         ICD-10: Treatment Diagnosis: Pain in left knee (M25.562); Difficulty in walking, not elsewhere classified (R26.2)  Precautions/Allergies:   Patient has no known allergies. Fall Risk Score: 1 (? 5 = High Risk)  MD Orders: evaluate and treat MEDICAL/REFERRING DIAGNOSIS:  Unilateral primary osteoarthritis, left knee [M17.12]  Chondromalacia patellae, left knee [M22.42]  Other tear of lateral meniscus, current injury, left knee, initial encounter [I94.292O]   DATE OF ONSET: date of surgery: 10/12/18 s/p left knee arthroscopy (please refer to surgical notes for specifics. REFERRING PHYSICIAN: Selestine Cooks, MD  RETURN PHYSICIAN APPOINTMENT: 10/29/18     INITIAL ASSESSMENT:  Mr. Mirna Pierre presents to therapy with decreased joint mobility at the knee into extension and flexion; restricted flexibility of the quadriceps, hamstrings and calf musculature, decreased strength throughout the lower extremity secondary to neuromuscular inhibition, atrophy, and pain. This causes difficulty with weight bearing activities such as walking, getting up from a low chair, going up/down stairs, and recreational activities such as running and playing pickle ball. Skilled therapy is required to return to prior level of function. PROBLEM LIST (Impacting functional limitations):  1. Decreased Strength  2. Decreased ADL/Functional Activities  3. Decreased Ambulation Ability/Technique  4. Decreased Balance  5. Increased Pain  6. Decreased Activity Tolerance  7. Decreased Flexibility/Joint Mobility INTERVENTIONS PLANNED:  1. Balance Exercise  2. Family Education  3. Gait Training  4. Home Exercise Program (HEP)  5. Manual Therapy  6. Neuromuscular Re-education/Strengthening  7.  Range of Motion (ROM)  8. Therapeutic Activites  9. Therapeutic Exercise/Strengthening  10. modalities    TREATMENT PLAN:  Effective Dates: 10/15/2018 TO 1/14/2019 (90 days). Frequency/Duration: 3 times a week for 90 Days  GOALS: (Goals have been discussed and agreed upon with patient.)  Short-Term Functional Goals: Time Frame: 2 weeks MET  1. Patient will be independent with HEP. 2. Patient will improve active knee extension to 0 ° during stance to restore symmetric joint loading during stance phase of gait. 3. Patient will report pain <2/10 with daily activity. Discharge Goals: Time Frame: 6 weeks  1. Patient will improve knee mobility to 2-0-135 ° to normalize joint mobility for symmetric gait on all surfaces and at all speeds. MET  2. Patient will report no pain with progression to independent gym routine to return to prior level of function. PROGRESSING  3. Patient will improve knee extension strength to 5/5 to restore dynamic stability required for stability during gait at all speeds. PROGRESSING  Rehabilitation Potential For Stated Goals: Excellent                HISTORY:   History of Present Injury/Illness (Reason for Referral):  Patient presents to therapy with primary complaint of left knee pain, stiffness, and swelling following above surgical procedure. He reports a 2 year history of intermittent knee pain and stiffness associated with running and activities such as going down stairs that was previously managed with conservative methods. He had been able to return to a limited running regimen but over the summer was beginning to have increased stiffness, increased discomfort for longer periods into his run, and increased pain on the days following his run. He was also having more sensitivity with general daily walking activities. He notes no complications with surgery and has only had to take Aleeve on the afternoon following surgery and the morning after surgery.  Otherwise he has been icing to manage his discomfort. He does report some stiffness and soreness this morning that he attributes to returning to work at his standing desk and doing a little bit of walking over the weekend. He is an active individual and would like to return to activities such as running, playing pickleball and hiking. Past Medical History/Comorbidities:   Mr. María Elena Apple  has a past medical history of Acute sinusitis, Arthritis, Bilateral impacted cerumen, Chronic sinusitis, Conductive hearing loss, Esophageal reflux, Hearing loss, Hearing loss due to cerumen impaction, Itching of ear, and Left knee pain. Mr. María Elena Apple  has a past surgical history that includes hx lumbar laminectomy; hx tonsillectomy; hx wisdom teeth extraction; hx colonoscopy; and LEFT KNEE ARTHROSCOPY/  MEDIAL AND LATERAL MENISCECTOMY (Left, 10/12/2018). Social History/Living Environment:     Patient lives at home with his wife. Prior Level of Function/Work/Activity:  Patient is a . He enjoys running, playing pickleball and hiking. Dominant Side:         RIGHT  Current Medications:    Current Outpatient Medications:     aspirin delayed-release 81 mg tablet, Take 81 mg by mouth daily. Indications: prevention, Disp: , Rfl:     cetirizine (ZYRTEC) 10 mg tablet, Take 10 mg by mouth every other day., Disp: , Rfl:    Date Last Reviewed:  11/14/2018   EXAMINATION:   Observation/Orthostatic Postural Assessment:          Incision sites are closed and covered with steri strips. There appears to be some blistering at the superior portion of the lateral incision. Girth: Calf: 37.5cm L, 38cm R; Mid patella: 40cm L, 38cm R; Mid thigh: 48cm L, 49cm R  Palpation:          Mild tenderness along incision sites. ROM:     Left knee: 2-120 ° active; 0 to 125 ° passive  Right knee: 1-0-135 °   SLR: 60 ° L; 70 ° R  Hip extension: WNL   11/05/18 update:   Joint mobility is symmetric with uninvolved.     Strength:     Quad set is fair  SLR performed with 5 ° extension lag  Knee extension: 4-/5 L; 5/5 R  SLR: 4/5 L; 5/5 R  Hip abduction: 4/5 L; 5/5 R   11/05/18 update:   Knee extension: 4/5 L  SLR: 4+/5 L  Hip abduction: 4+/5 L   Special Tests:          Not applicable  Neurological Screen:        Light touch sensation is fully intact. Functional Mobility:         Ambulates without assistive device. Ambulates with slight increase in knee flexion during stance phase of gait. Balance:          Maintains single leg stance with eyes open for 5 seconds. Body Structures Involved:  1. Nerves  2. Bones  3. Joints  4. Muscles  5. Ligaments Body Functions Affected:  1. Sensory/Pain  2. Neuromusculoskeletal  3. Movement Related Activities and Participation Affected:  1. Learning and Applying Knowledge  2. General Tasks and Demands  3. Mobility  4. Self Care  5. Domestic Life  6. Interpersonal Interactions and Relationships  7. Community, Social and Civic Life   CLINICAL PRESENTATION:   CLINICAL DECISION MAKING:   Outcome Measure: Tool Used: Lower Extremity Functional Scale (LEFS)  Score:  Initial: 47/80 Most Recent:  Per patient report notes 30% impairments with daily activity. (11/05/18)   Interpretation of Score: 20 questions each scored on a 5 point scale with 0 representing \"extreme difficulty or unable to perform\" and 4 representing \"no difficulty\". The lower the score, the greater the functional disability. 80/80 represents no disability. Minimal detectable change is 9 points. Score 80 79-63 62-48 47-32 31-16 15-1 0   Modifier CH CI CJ CK CL CM CN     ?  Mobility - Walking and Moving Around:     - CURRENT STATUS: CJ - 20%-39% impaired, limited or restricted    - GOAL STATUS: CI - 1%-19% impaired, limited or restricted    - D/C STATUS:  ---------------To be determined---------------    Medical Necessity:   · Patient is expected to demonstrate progress in strength, range of motion, balance and coordination to increase independence with community mobility and return to prior level of function. Reason for Services/Other Comments:  · Patient has demonstrated an improvement in functional level by independent performance of HEP. TREATMENT:   (In addition to Assessment/Re-Assessment sessions the following treatments were rendered)  THERAPEUTIC EXERCISE: (see below for minutes):  Exercises per grid below to improve mobility, strength, balance and coordination. Required minimal verbal and manual cues to promote proper body alignment, promote proper body posture and promote proper body mechanics. Progressed resistance, range, repetitions and complexity of movement as indicated. MANUAL THERAPY: (see below for minutes): Joint mobilization and Soft tissue mobilization was utilized and necessary because of the patient's restricted joint motion, painful spasm, loss of articular motion and restricted motion of soft tissue. MODALITIES: (see below for minutes):       To decrease pain     Date: 10/29/18 (visit 7) 10/31/18 (visit 8) 11/01/18 (visit 9) 11/05/18 (visit 10) progress note 11/07/18 (visit 11) 11/12/18 (visit 12) 11/14/18 (visit 13)    Modalities: 15 min 15 min 15 min 15 min  15 min  15 min 15 min     repeat repeat repeat repeat repeat gameready vasopneumatic compression x 15 min to decrease swelling and pain repeat                          Therapeutic Exercise: 40 min 40 min 30 min 35 min 35 min 35 min 15 min    Bike 5 min 5 min 5 min  5 min 5 min     Heel slides           Quad set           SLR repeat repeat 10# SLR with 10sec on/off x10 min 10# 10sec on/off x5 min; LAQ 20# 5sec on/off x 5 min repeat repeat repeat    Hip abduction           Stretching Manual stretch into terminal knee extension repeat Repeat; AIS ITB stretch [id number] repeat  Prone quad stretch with RLE on floor [id number] repeat    FTKE           Walking march Standing at cable column 25# 3x15          Bridge           Lateral band walks Black x 3 laps Black x 3 laps Grey x3 laps lateral, x 2 laps fwd Kick backs blue 3x15 repeat Green loop x4 laps lateral; kick backs 3x12 B     Single leg deadlifts  3x8 with reach to table         Single leg stance  Step up to BOSU 20x         Incline board repeat 2x1 min calf stretch 2x1 min calf stretch repeat 2x1 min calf stretch repeat 2x1 min calf stretch    Leg press   BOSU squats 2x10 BOSU squat 3x10 BOSU squat 3x10      Step ups    Step back from 6\" step to single leg stance 3x15  Single leg squat from step 10\" 2x8     Stability ball hamstring curls 3x10 3x10         Lunge Reverse with tootie slide 3x10 with manual cueing for frontal plane control repeat  Tootie slide 3x15 Tootie slide with med ball rotation 3x8 repeat                Proprioceptive Activities:                                            Manual Therapy: 5 min 5 min 15 min 10 min 10 min 10 min 30 min    Mobilizations repeat repeat Soft tissue mobilization to hamstring, posterior knee, calf To hamstring and posterior ITB repeat repeat Prone STM to posterior knee, hamstring, quad, ITB               Therapeutic Activities:                                                       HEP: see 10/15/18 flow sheet for specifics  [facility] Portal  Treatment/Session Assessment: Decreased volume of loading today due to increased joint soreness. · Pain/ Symptoms: Initial:   2/10  \"It's a little more sore today, but I've been doing a lot the past few days. It feels like the extension is a bit tighter. \" Post Session:  No increase following today's treatment session. ·   Compliance with Program/Exercises: Will assess as treatment progresses. · Recommendations/Intent for next treatment session: \"Next visit will focus on advancements to more challenging activities\".   Total Treatment Duration:  PT Patient Time In/Time Out  Time In: 1530  Time Out: [address], DPT

## 2018-11-15 ENCOUNTER — HOSPITAL ENCOUNTER (OUTPATIENT)
Dept: PHYSICAL THERAPY | Age: 68
Discharge: HOME | End: 2018-11-15
Payer: COMMERCIAL

## 2018-11-15 PROCEDURE — 97110 THERAPEUTIC EXERCISES: CPT

## 2018-11-15 PROCEDURE — 97016 VASOPNEUMATIC DEVICE THERAPY: CPT

## 2018-11-15 PROCEDURE — 97140 MANUAL THERAPY 1/> REGIONS: CPT

## 2018-11-15 NOTE — PROGRESS NOTES
Elías Reynolds  : 1950 [address] [address].  [phone]   [phone]        OUTPATIENT PHYSICAL THERAPY:Daily Note 11/15/2018         ICD-10: Treatment Diagnosis: Pain in left knee (M25.562); Difficulty in walking, not elsewhere classified (R26.2)  Precautions/Allergies:   Patient has no known allergies. Fall Risk Score: 1 (? 5 = High Risk)  MD Orders: evaluate and treat MEDICAL/REFERRING DIAGNOSIS:  Unilateral primary osteoarthritis, left knee [M17.12]  Chondromalacia patellae, left knee [M22.42]  Other tear of lateral meniscus, current injury, left knee, initial encounter [T01.613B]   DATE OF ONSET: date of surgery: 10/12/18 s/p left knee arthroscopy (please refer to surgical notes for specifics. REFERRING PHYSICIAN: Dasha Gandara MD  RETURN PHYSICIAN APPOINTMENT: 10/29/18     INITIAL ASSESSMENT:  Mr. Nixon Tellez presents to therapy with decreased joint mobility at the knee into extension and flexion; restricted flexibility of the quadriceps, hamstrings and calf musculature, decreased strength throughout the lower extremity secondary to neuromuscular inhibition, atrophy, and pain. This causes difficulty with weight bearing activities such as walking, getting up from a low chair, going up/down stairs, and recreational activities such as running and playing pickle ball. Skilled therapy is required to return to prior level of function. PROBLEM LIST (Impacting functional limitations):  1. Decreased Strength  2. Decreased ADL/Functional Activities  3. Decreased Ambulation Ability/Technique  4. Decreased Balance  5. Increased Pain  6. Decreased Activity Tolerance  7. Decreased Flexibility/Joint Mobility INTERVENTIONS PLANNED:  1. Balance Exercise  2. Family Education  3. Gait Training  4. Home Exercise Program (HEP)  5. Manual Therapy  6. Neuromuscular Re-education/Strengthening  7.  Range of Motion (ROM)  8. Therapeutic Activites  9. Therapeutic Exercise/Strengthening  10. modalities    TREATMENT PLAN:  Effective Dates: 10/15/2018 TO 1/14/2019 (90 days). Frequency/Duration: 3 times a week for 90 Days  GOALS: (Goals have been discussed and agreed upon with patient.)  Short-Term Functional Goals: Time Frame: 2 weeks MET  1. Patient will be independent with HEP. 2. Patient will improve active knee extension to 0 ° during stance to restore symmetric joint loading during stance phase of gait. 3. Patient will report pain <2/10 with daily activity. Discharge Goals: Time Frame: 6 weeks  1. Patient will improve knee mobility to 2-0-135 ° to normalize joint mobility for symmetric gait on all surfaces and at all speeds. MET  2. Patient will report no pain with progression to independent gym routine to return to prior level of function. PROGRESSING  3. Patient will improve knee extension strength to 5/5 to restore dynamic stability required for stability during gait at all speeds. PROGRESSING  Rehabilitation Potential For Stated Goals: Excellent                HISTORY:   History of Present Injury/Illness (Reason for Referral):  Patient presents to therapy with primary complaint of left knee pain, stiffness, and swelling following above surgical procedure. He reports a 2 year history of intermittent knee pain and stiffness associated with running and activities such as going down stairs that was previously managed with conservative methods. He had been able to return to a limited running regimen but over the summer was beginning to have increased stiffness, increased discomfort for longer periods into his run, and increased pain on the days following his run. He was also having more sensitivity with general daily walking activities. He notes no complications with surgery and has only had to take Aleeve on the afternoon following surgery and the morning after surgery.  Otherwise he has been icing to manage his discomfort. He does report some stiffness and soreness this morning that he attributes to returning to work at his standing desk and doing a little bit of walking over the weekend. He is an active individual and would like to return to activities such as running, playing pickleball and hiking. Past Medical History/Comorbidities:   Mr. Darlene Patricia  has a past medical history of Acute sinusitis, Arthritis, Bilateral impacted cerumen, Chronic sinusitis, Conductive hearing loss, Esophageal reflux, Hearing loss, Hearing loss due to cerumen impaction, Itching of ear, and Left knee pain. Mr. Darlene Patricia  has a past surgical history that includes hx lumbar laminectomy; hx tonsillectomy; hx wisdom teeth extraction; hx colonoscopy; and LEFT KNEE ARTHROSCOPY/  MEDIAL AND LATERAL MENISCECTOMY (Left, 10/12/2018). Social History/Living Environment:     Patient lives at home with his wife. Prior Level of Function/Work/Activity:  Patient is a . He enjoys running, playing pickleball and hiking. Dominant Side:         RIGHT  Current Medications:    Current Outpatient Medications:     aspirin delayed-release 81 mg tablet, Take 81 mg by mouth daily. Indications: prevention, Disp: , Rfl:     cetirizine (ZYRTEC) 10 mg tablet, Take 10 mg by mouth every other day., Disp: , Rfl:    Date Last Reviewed:  11/15/2018   EXAMINATION:   Observation/Orthostatic Postural Assessment:          Incision sites are closed and covered with steri strips. There appears to be some blistering at the superior portion of the lateral incision. Girth: Calf: 37.5cm L, 38cm R; Mid patella: 40cm L, 38cm R; Mid thigh: 48cm L, 49cm R  Palpation:          Mild tenderness along incision sites. ROM:     Left knee: 2-120 ° active; 0 to 125 ° passive  Right knee: 1-0-135 °   SLR: 60 ° L; 70 ° R  Hip extension: WNL   11/05/18 update:   Joint mobility is symmetric with uninvolved.     Strength:     Quad set is fair  SLR performed with 5 ° extension lag  Knee extension: 4-/5 L; 5/5 R  SLR: 4/5 L; 5/5 R  Hip abduction: 4/5 L; 5/5 R   11/05/18 update:   Knee extension: 4/5 L  SLR: 4+/5 L  Hip abduction: 4+/5 L   Special Tests:          Not applicable  Neurological Screen:        Light touch sensation is fully intact. Functional Mobility:         Ambulates without assistive device. Ambulates with slight increase in knee flexion during stance phase of gait. Balance:          Maintains single leg stance with eyes open for 5 seconds. Body Structures Involved:  1. Nerves  2. Bones  3. Joints  4. Muscles  5. Ligaments Body Functions Affected:  1. Sensory/Pain  2. Neuromusculoskeletal  3. Movement Related Activities and Participation Affected:  1. Learning and Applying Knowledge  2. General Tasks and Demands  3. Mobility  4. Self Care  5. Domestic Life  6. Interpersonal Interactions and Relationships  7. Community, Social and Civic Life   CLINICAL PRESENTATION:   CLINICAL DECISION MAKING:   Outcome Measure: Tool Used: Lower Extremity Functional Scale (LEFS)  Score:  Initial: 47/80 Most Recent:  Per patient report notes 30% impairments with daily activity. (11/05/18)   Interpretation of Score: 20 questions each scored on a 5 point scale with 0 representing \"extreme difficulty or unable to perform\" and 4 representing \"no difficulty\". The lower the score, the greater the functional disability. 80/80 represents no disability. Minimal detectable change is 9 points. Score 80 79-63 62-48 47-32 31-16 15-1 0   Modifier CH CI CJ CK CL CM CN     ?  Mobility - Walking and Moving Around:     - CURRENT STATUS: CJ - 20%-39% impaired, limited or restricted    - GOAL STATUS: CI - 1%-19% impaired, limited or restricted    - D/C STATUS:  ---------------To be determined---------------    Medical Necessity:   · Patient is expected to demonstrate progress in strength, range of motion, balance and coordination to increase independence with community mobility and return to prior level of function. Reason for Services/Other Comments:  · Patient has demonstrated an improvement in functional level by independent performance of HEP. TREATMENT:   (In addition to Assessment/Re-Assessment sessions the following treatments were rendered)  THERAPEUTIC EXERCISE: (see below for minutes):  Exercises per grid below to improve mobility, strength, balance and coordination. Required minimal verbal and manual cues to promote proper body alignment, promote proper body posture and promote proper body mechanics. Progressed resistance, range, repetitions and complexity of movement as indicated. MANUAL THERAPY: (see below for minutes): Joint mobilization and Soft tissue mobilization was utilized and necessary because of the patient's restricted joint motion, painful spasm, loss of articular motion and restricted motion of soft tissue. MODALITIES: (see below for minutes):       To decrease pain     Date: 10/29/18 (visit 7) 10/31/18 (visit 8) 11/01/18 (visit 9) 11/05/18 (visit 10) progress note 11/07/18 (visit 11) 11/12/18 (visit 12) 11/14/18 (visit 13) 11/15/18 (visit 14)   Modalities: 15 min 15 min 15 min 15 min  15 min  15 min 15 min 15 min    repeat repeat repeat repeat repeat gameready vasopneumatic compression x 15 min to decrease swelling and pain repeat repeat                         Therapeutic Exercise: 40 min 40 min 30 min 35 min 35 min 35 min 15 min 30 min   Bike 5 min 5 min 5 min  5 min 5 min  5 min   Heel slides           Quad set           SLR repeat repeat 10# SLR with 10sec on/off x10 min 10# 10sec on/off x5 min; LAQ 20# 5sec on/off x 5 min repeat repeat repeat repeat   Hip abduction           Stretching Manual stretch into terminal knee extension repeat Repeat; AIS ITB stretch [id number] repeat  Prone quad stretch with RLE on floor [id number] repeat repeat   FTKE           Walking march Standing at cable column 25# 3x15          Bridge           Lateral band walks Black x 3 laps Black x 3 laps Grey x3 laps lateral, x 2 laps fwd Kick backs blue 3x15 repeat Green loop x4 laps lateral; kick backs 3x12 B  Green tband x 3 laps lateral, kick backs 3x10   Single leg deadlifts  3x8 with reach to table         Single leg stance  Step up to BOSU 20x      Single leg deadlift with lateral reach 3x5 B   Incline board repeat 2x1 min calf stretch 2x1 min calf stretch repeat 2x1 min calf stretch repeat 2x1 min calf stretch repeat   Leg press   BOSU squats 2x10 BOSU squat 3x10 BOSU squat 3x10      Step ups    Step back from 6\" step to single leg stance 3x15  Single leg squat from step 10\" 2x8     Stability ball hamstring curls 3x10 3x10         Lunge Reverse with tootie slide 3x10 with manual cueing for frontal plane control repeat  Tootie slide 3x15 Tootie slide with med ball rotation 3x8 repeat  Tootie slide reverse 8# 3x10              Proprioceptive Activities:                                            Manual Therapy: 5 min 5 min 15 min 10 min 10 min 10 min 30 min 10 min   Mobilizations repeat repeat Soft tissue mobilization to hamstring, posterior knee, calf To hamstring and posterior ITB repeat repeat Prone STM to posterior knee, hamstring, quad, ITB STM to posterior thigh               Therapeutic Activities:                                                       HEP: see 10/15/18 flow sheet for specifics  [facility] Portal  Treatment/Session Assessment: Knee extension remains full. No swelling noted. Demonstrates good control of stability with lateral reaching during single leg tasks. · Pain/ Symptoms: Initial:   2/10  \"I'm feeling pretty good today. The back of the knee isn't as tight as it was earlier this week. \" Post Session:  No increase following today's treatment session. ·   Compliance with Program/Exercises: Will assess as treatment progresses. · Recommendations/Intent for next treatment session: \"Next visit will focus on advancements to more challenging activities\".   Total Treatment Duration:  PT Patient Time In/Time Out  Time In: 1615  Time Out: [address], DPT

## 2018-11-19 ENCOUNTER — HOSPITAL ENCOUNTER (OUTPATIENT)
Dept: PHYSICAL THERAPY | Age: 68
Discharge: HOME | End: 2018-11-19
Payer: COMMERCIAL

## 2018-11-19 ENCOUNTER — APPOINTMENT (OUTPATIENT)
Dept: PHYSICAL THERAPY | Age: 68
End: 2018-11-19
Payer: COMMERCIAL

## 2018-11-19 PROCEDURE — 97110 THERAPEUTIC EXERCISES: CPT

## 2018-11-19 PROCEDURE — 97016 VASOPNEUMATIC DEVICE THERAPY: CPT

## 2018-11-19 PROCEDURE — 97140 MANUAL THERAPY 1/> REGIONS: CPT

## 2018-11-19 NOTE — PROGRESS NOTES
Lloyd Hernández  : 1950 [id number] [facility]  [address] [place] [address]  [phone]   [phone]        OUTPATIENT PHYSICAL THERAPY:Daily Note 2018         ICD-10: Treatment Diagnosis: Pain in left knee (M25.562); Difficulty in walking, not elsewhere classified (R26.2)  Precautions/Allergies:   Patient has no known allergies. Fall Risk Score: 1 (? 5 = High Risk)  MD Orders: evaluate and treat MEDICAL/REFERRING DIAGNOSIS:  Unilateral primary osteoarthritis, left knee [M17.12]  Chondromalacia patellae, left knee [M22.42]  Other tear of lateral meniscus, current injury, left knee, initial encounter [O59.377C]   DATE OF ONSET: date of surgery: 10/12/18 s/p left knee arthroscopy (please refer to surgical notes for specifics. REFERRING PHYSICIAN: Christine Bernstein MD  RETURN PHYSICIAN APPOINTMENT: 10/29/18     INITIAL ASSESSMENT:  Mr. Raheem Freeman presents to therapy with decreased joint mobility at the knee into extension and flexion; restricted flexibility of the quadriceps, hamstrings and calf musculature, decreased strength throughout the lower extremity secondary to neuromuscular inhibition, atrophy, and pain. This causes difficulty with weight bearing activities such as walking, getting up from a low chair, going up/down stairs, and recreational activities such as running and playing pickle ball. Skilled therapy is required to return to prior level of function. PROBLEM LIST (Impacting functional limitations):  1. Decreased Strength  2. Decreased ADL/Functional Activities  3. Decreased Ambulation Ability/Technique  4. Decreased Balance  5. Increased Pain  6. Decreased Activity Tolerance  7. Decreased Flexibility/Joint Mobility INTERVENTIONS PLANNED:  1. Balance Exercise  2. Family Education  3. Gait Training  4. Home Exercise Program (HEP)  5. Manual Therapy  6. Neuromuscular Re-education/Strengthening  7.  Range of Motion (ROM)  8. Therapeutic Activites  9. Therapeutic Exercise/Strengthening  10. modalities    TREATMENT PLAN:  Effective Dates: 10/15/2018 TO 1/14/2019 (90 days). Frequency/Duration: 3 times a week for 90 Days  GOALS: (Goals have been discussed and agreed upon with patient.)  Short-Term Functional Goals: Time Frame: 2 weeks MET  1. Patient will be independent with HEP. 2. Patient will improve active knee extension to 0 ° during stance to restore symmetric joint loading during stance phase of gait. 3. Patient will report pain <2/10 with daily activity. Discharge Goals: Time Frame: 6 weeks  1. Patient will improve knee mobility to 2-0-135 ° to normalize joint mobility for symmetric gait on all surfaces and at all speeds. MET  2. Patient will report no pain with progression to independent gym routine to return to prior level of function. PROGRESSING  3. Patient will improve knee extension strength to 5/5 to restore dynamic stability required for stability during gait at all speeds. PROGRESSING  Rehabilitation Potential For Stated Goals: Excellent                HISTORY:   History of Present Injury/Illness (Reason for Referral):  Patient presents to therapy with primary complaint of left knee pain, stiffness, and swelling following above surgical procedure. He reports a 2 year history of intermittent knee pain and stiffness associated with running and activities such as going down stairs that was previously managed with conservative methods. He had been able to return to a limited running regimen but over the summer was beginning to have increased stiffness, increased discomfort for longer periods into his run, and increased pain on the days following his run. He was also having more sensitivity with general daily walking activities. He notes no complications with surgery and has only had to take Aleeve on the afternoon following surgery and the morning after surgery.  Otherwise he has been icing to manage his discomfort. He does report some stiffness and soreness this morning that he attributes to returning to work at his standing desk and doing a little bit of walking over the weekend. He is an active individual and would like to return to activities such as running, playing pickleball and hiking. Past Medical History/Comorbidities:   Mr. Mirna Pierre  has a past medical history of Acute sinusitis, Arthritis, Bilateral impacted cerumen, Chronic sinusitis, Conductive hearing loss, Esophageal reflux, Hearing loss, Hearing loss due to cerumen impaction, Itching of ear, and Left knee pain. Mr. Mirna Pierre  has a past surgical history that includes hx lumbar laminectomy; hx tonsillectomy; hx wisdom teeth extraction; hx colonoscopy; and LEFT KNEE ARTHROSCOPY/  MEDIAL AND LATERAL MENISCECTOMY (Left, 10/12/2018). Social History/Living Environment:     Patient lives at home with his wife. Prior Level of Function/Work/Activity:  Patient is a . He enjoys running, playing pickleball and hiking. Dominant Side:         RIGHT  Current Medications:    Current Outpatient Medications:     aspirin delayed-release 81 mg tablet, Take 81 mg by mouth daily. Indications: prevention, Disp: , Rfl:     cetirizine (ZYRTEC) 10 mg tablet, Take 10 mg by mouth every other day., Disp: , Rfl:    Date Last Reviewed:  11/19/2018   EXAMINATION:   Observation/Orthostatic Postural Assessment:          Incision sites are closed and covered with steri strips. There appears to be some blistering at the superior portion of the lateral incision. Girth: Calf: 37.5cm L, 38cm R; Mid patella: 40cm L, 38cm R; Mid thigh: 48cm L, 49cm R  Palpation:          Mild tenderness along incision sites. ROM:     Left knee: 2-120 ° active; 0 to 125 ° passive  Right knee: 1-0-135 °   SLR: 60 ° L; 70 ° R  Hip extension: WNL   11/05/18 update:   Joint mobility is symmetric with uninvolved.     Strength:     Quad set is fair  SLR performed with 5 ° extension lag  Knee extension: 4-/5 L; 5/5 R  SLR: 4/5 L; 5/5 R  Hip abduction: 4/5 L; 5/5 R   11/05/18 update:   Knee extension: 4/5 L  SLR: 4+/5 L  Hip abduction: 4+/5 L   Special Tests:          Not applicable  Neurological Screen:        Light touch sensation is fully intact. Functional Mobility:         Ambulates without assistive device. Ambulates with slight increase in knee flexion during stance phase of gait. Balance:          Maintains single leg stance with eyes open for 5 seconds. Body Structures Involved:  1. Nerves  2. Bones  3. Joints  4. Muscles  5. Ligaments Body Functions Affected:  1. Sensory/Pain  2. Neuromusculoskeletal  3. Movement Related Activities and Participation Affected:  1. Learning and Applying Knowledge  2. General Tasks and Demands  3. Mobility  4. Self Care  5. Domestic Life  6. Interpersonal Interactions and Relationships  7. Community, Social and Civic Life   CLINICAL PRESENTATION:   CLINICAL DECISION MAKING:   Outcome Measure: Tool Used: Lower Extremity Functional Scale (LEFS)  Score:  Initial: 47/80 Most Recent:  Per patient report notes 30% impairments with daily activity. (11/05/18)   Interpretation of Score: 20 questions each scored on a 5 point scale with 0 representing \"extreme difficulty or unable to perform\" and 4 representing \"no difficulty\". The lower the score, the greater the functional disability. 80/80 represents no disability. Minimal detectable change is 9 points. Score 80 79-63 62-48 47-32 31-16 15-1 0   Modifier CH CI CJ CK CL CM CN     ?  Mobility - Walking and Moving Around:     - CURRENT STATUS: CJ - 20%-39% impaired, limited or restricted    - GOAL STATUS: CI - 1%-19% impaired, limited or restricted    - D/C STATUS:  ---------------To be determined---------------    Medical Necessity:   · Patient is expected to demonstrate progress in strength, range of motion, balance and coordination to increase independence with community mobility and return to prior level of function. Reason for Services/Other Comments:  · Patient has demonstrated an improvement in functional level by independent performance of HEP. TREATMENT:   (In addition to Assessment/Re-Assessment sessions the following treatments were rendered)  THERAPEUTIC EXERCISE: (see below for minutes):  Exercises per grid below to improve mobility, strength, balance and coordination. Required minimal verbal and manual cues to promote proper body alignment, promote proper body posture and promote proper body mechanics. Progressed resistance, range, repetitions and complexity of movement as indicated. MANUAL THERAPY: (see below for minutes): Joint mobilization and Soft tissue mobilization was utilized and necessary because of the patient's restricted joint motion, painful spasm, loss of articular motion and restricted motion of soft tissue. MODALITIES: (see below for minutes):       To decrease pain     Date: 10/29/18 (visit 7) 10/31/18 (visit 8) 11/01/18 (visit 9) 11/05/18 (visit 10) progress note 11/07/18 (visit 11) 11/12/18 (visit 12) 11/14/18 (visit 13) 11/15/18 (visit 14) 11/19/18 (visit 15)   Modalities: 15 min 15 min 15 min 15 min  15 min  15 min 15 min 15 min 15 min    repeat repeat repeat repeat repeat gameready vasopneumatic compression x 15 min to decrease swelling and pain repeat repeat repeat                           Therapeutic Exercise: 40 min 40 min 30 min 35 min 35 min 35 min 15 min 30 min 15 min   Bike 5 min 5 min 5 min  5 min 5 min  5 min    Heel slides            Quad set            SLR repeat repeat 10# SLR with 10sec on/off x10 min 10# 10sec on/off x5 min; LAQ 20# 5sec on/off x 5 min repeat repeat repeat repeat    Hip abduction            Stretching Manual stretch into terminal knee extension repeat Repeat; AIS ITB stretch [id number] repeat  Prone quad stretch with RLE on floor [id number] repeat repeat Prone quad stretch [id number]   FTKE            Walking march Standing at cable column 25# 3x15 Bridge            Lateral band walks Black x 3 laps Black x 3 laps Grey x3 laps lateral, x 2 laps fwd Kick backs blue 3x15 repeat Green loop x4 laps lateral; kick backs 3x12 B  Green tband x 3 laps lateral, kick backs 3x10 Black loop tband x3 laps   Single leg deadlifts  3x8 with reach to table          Single leg stance  Step up to BOSU 20x      Single leg deadlift with lateral reach 3x5 B    Incline board repeat 2x1 min calf stretch 2x1 min calf stretch repeat 2x1 min calf stretch repeat 2x1 min calf stretch repeat repeat   Leg press   BOSU squats 2x10 BOSU squat 3x10 BOSU squat 3x10       Step ups    Step back from 6\" step to single leg stance 3x15  Single leg squat from step 10\" 2x8      Stability ball hamstring curls 3x10 3x10          Lunge Reverse with tootie slide 3x10 with manual cueing for frontal plane control repeat  Tootie slide 3x15 Tootie slide with med ball rotation 3x8 repeat  Tootie slide reverse 8# 3x10                Proprioceptive Activities:                                                Manual Therapy: 5 min 5 min 15 min 10 min 10 min 10 min 30 min 10 min 30 min   Mobilizations repeat repeat Soft tissue mobilization to hamstring, posterior knee, calf To hamstring and posterior ITB repeat repeat Prone STM to posterior knee, hamstring, quad, ITB STM to posterior thigh  STM to quad, ITB, hamstring; patellar mobs               Therapeutic Activities:                                                            HEP: see 10/15/18 flow sheet for specifics  [facility] Portal  Treatment/Session Assessment: Continues to have full joint mobility. Discussed modifying load to the joint as this contributes to increased symptom irritation. · Pain/ Symptoms: Initial:   2/10  \"It's been a bit more sore, and I think it's been a bit more tender. \" Post Session:  No increase following today's treatment session. ·   Compliance with Program/Exercises: Will assess as treatment progresses.   · Recommendations/Intent for next treatment session: \"Next visit will focus on advancements to more challenging activities\".   Total Treatment Duration:  PT Patient Time In/Time Out  Time In: 1530  Time Out: [address], DPT

## 2018-11-21 ENCOUNTER — HOSPITAL ENCOUNTER (OUTPATIENT)
Dept: PHYSICAL THERAPY | Age: 68
Discharge: HOME | End: 2018-11-21
Payer: COMMERCIAL

## 2018-11-21 PROCEDURE — 97140 MANUAL THERAPY 1/> REGIONS: CPT

## 2018-11-21 PROCEDURE — 97016 VASOPNEUMATIC DEVICE THERAPY: CPT

## 2018-11-21 PROCEDURE — 97110 THERAPEUTIC EXERCISES: CPT

## 2018-11-21 NOTE — PROGRESS NOTES
Sonali Queen  : 1950 [facility] [address]  [address] [place] [address].  [phone]   [phone]        OUTPATIENT PHYSICAL THERAPY:Daily Note 2018         ICD-10: Treatment Diagnosis: Pain in left knee (M25.562); Difficulty in walking, not elsewhere classified (R26.2)  Precautions/Allergies:   Patient has no known allergies. Fall Risk Score: 1 (? 5 = High Risk)  MD Orders: evaluate and treat MEDICAL/REFERRING DIAGNOSIS:  Unilateral primary osteoarthritis, left knee [M17.12]  Chondromalacia patellae, left knee [M22.42]  Other tear of lateral meniscus, current injury, left knee, initial encounter [X70.539K]   DATE OF ONSET: date of surgery: 10/12/18 s/p left knee arthroscopy (please refer to surgical notes for specifics. REFERRING PHYSICIAN: Tatum Bose MD  RETURN PHYSICIAN APPOINTMENT: 10/29/18     INITIAL ASSESSMENT:  Mr. Jocy Marshall presents to therapy with decreased joint mobility at the knee into extension and flexion; restricted flexibility of the quadriceps, hamstrings and calf musculature, decreased strength throughout the lower extremity secondary to neuromuscular inhibition, atrophy, and pain. This causes difficulty with weight bearing activities such as walking, getting up from a low chair, going up/down stairs, and recreational activities such as running and playing pickle ball. Skilled therapy is required to return to prior level of function. PROBLEM LIST (Impacting functional limitations):  1. Decreased Strength  2. Decreased ADL/Functional Activities  3. Decreased Ambulation Ability/Technique  4. Decreased Balance  5. Increased Pain  6. Decreased Activity Tolerance  7. Decreased Flexibility/Joint Mobility INTERVENTIONS PLANNED:  1. Balance Exercise  2. Family Education  3. Gait Training  4. Home Exercise Program (HEP)  5. Manual Therapy  6. Neuromuscular Re-education/Strengthening  7.  Range of Motion (ROM)  8. Therapeutic Activites  9. Therapeutic Exercise/Strengthening  10. modalities    TREATMENT PLAN:  Effective Dates: 10/15/2018 TO 1/14/2019 (90 days). Frequency/Duration: 3 times a week for 90 Days  GOALS: (Goals have been discussed and agreed upon with patient.)  Short-Term Functional Goals: Time Frame: 2 weeks MET  1. Patient will be independent with HEP. 2. Patient will improve active knee extension to 0 ° during stance to restore symmetric joint loading during stance phase of gait. 3. Patient will report pain <2/10 with daily activity. Discharge Goals: Time Frame: 6 weeks  1. Patient will improve knee mobility to 2-0-135 ° to normalize joint mobility for symmetric gait on all surfaces and at all speeds. MET  2. Patient will report no pain with progression to independent gym routine to return to prior level of function. PROGRESSING  3. Patient will improve knee extension strength to 5/5 to restore dynamic stability required for stability during gait at all speeds. PROGRESSING  Rehabilitation Potential For Stated Goals: Excellent                HISTORY:   History of Present Injury/Illness (Reason for Referral):  Patient presents to therapy with primary complaint of left knee pain, stiffness, and swelling following above surgical procedure. He reports a 2 year history of intermittent knee pain and stiffness associated with running and activities such as going down stairs that was previously managed with conservative methods. He had been able to return to a limited running regimen but over the summer was beginning to have increased stiffness, increased discomfort for longer periods into his run, and increased pain on the days following his run. He was also having more sensitivity with general daily walking activities. He notes no complications with surgery and has only had to take Aleeve on the afternoon following surgery and the morning after surgery.  Otherwise he has been icing to manage his discomfort. He does report some stiffness and soreness this morning that he attributes to returning to work at his standing desk and doing a little bit of walking over the weekend. He is an active individual and would like to return to activities such as running, playing pickleball and hiking. Past Medical History/Comorbidities:   Mr. Jocy Marshall  has a past medical history of Acute sinusitis, Arthritis, Bilateral impacted cerumen, Chronic sinusitis, Conductive hearing loss, Esophageal reflux, Hearing loss, Hearing loss due to cerumen impaction, Itching of ear, and Left knee pain. Mr. Jocy Marshall  has a past surgical history that includes hx lumbar laminectomy; hx tonsillectomy; hx wisdom teeth extraction; hx colonoscopy; and LEFT KNEE ARTHROSCOPY/  MEDIAL AND LATERAL MENISCECTOMY (Left, 10/12/2018). Social History/Living Environment:     Patient lives at home with his wife. Prior Level of Function/Work/Activity:  Patient is a . He enjoys running, playing pickleball and hiking. Dominant Side:         RIGHT  Current Medications:    Current Outpatient Medications:     aspirin delayed-release 81 mg tablet, Take 81 mg by mouth daily. Indications: prevention, Disp: , Rfl:     cetirizine (ZYRTEC) 10 mg tablet, Take 10 mg by mouth every other day., Disp: , Rfl:    Date Last Reviewed:  11/21/2018   EXAMINATION:   Observation/Orthostatic Postural Assessment:          Incision sites are closed and covered with steri strips. There appears to be some blistering at the superior portion of the lateral incision. Girth: Calf: 37.5cm L, 38cm R; Mid patella: 40cm L, 38cm R; Mid thigh: 48cm L, 49cm R  Palpation:          Mild tenderness along incision sites. ROM:     Left knee: 2-120 ° active; 0 to 125 ° passive  Right knee: 1-0-135 °   SLR: 60 ° L; 70 ° R  Hip extension: WNL   11/05/18 update:   Joint mobility is symmetric with uninvolved.     Strength:     Quad set is fair  SLR performed with 5 ° extension lag  Knee extension: 4-/5 L; 5/5 R  SLR: 4/5 L; 5/5 R  Hip abduction: 4/5 L; 5/5 R   11/05/18 update:   Knee extension: 4/5 L  SLR: 4+/5 L  Hip abduction: 4+/5 L   Special Tests:          Not applicable  Neurological Screen:        Light touch sensation is fully intact. Functional Mobility:         Ambulates without assistive device. Ambulates with slight increase in knee flexion during stance phase of gait. Balance:          Maintains single leg stance with eyes open for 5 seconds. Body Structures Involved:  1. Nerves  2. Bones  3. Joints  4. Muscles  5. Ligaments Body Functions Affected:  1. Sensory/Pain  2. Neuromusculoskeletal  3. Movement Related Activities and Participation Affected:  1. Learning and Applying Knowledge  2. General Tasks and Demands  3. Mobility  4. Self Care  5. Domestic Life  6. Interpersonal Interactions and Relationships  7. Community, Social and Civic Life   CLINICAL PRESENTATION:   CLINICAL DECISION MAKING:   Outcome Measure: Tool Used: Lower Extremity Functional Scale (LEFS)  Score:  Initial: 47/80 Most Recent:  Per patient report notes 30% impairments with daily activity. (11/05/18)   Interpretation of Score: 20 questions each scored on a 5 point scale with 0 representing \"extreme difficulty or unable to perform\" and 4 representing \"no difficulty\". The lower the score, the greater the functional disability. 80/80 represents no disability. Minimal detectable change is 9 points. Score 80 79-63 62-48 47-32 31-16 15-1 0   Modifier CH CI CJ CK CL CM CN     ?  Mobility - Walking and Moving Around:     - CURRENT STATUS: CJ - 20%-39% impaired, limited or restricted    - GOAL STATUS: CI - 1%-19% impaired, limited or restricted    - D/C STATUS:  ---------------To be determined---------------    Medical Necessity:   · Patient is expected to demonstrate progress in strength, range of motion, balance and coordination to increase independence with community mobility and return to prior level of function. Reason for Services/Other Comments:  · Patient has demonstrated an improvement in functional level by independent performance of HEP. TREATMENT:   (In addition to Assessment/Re-Assessment sessions the following treatments were rendered)  THERAPEUTIC EXERCISE: (see below for minutes):  Exercises per grid below to improve mobility, strength, balance and coordination. Required minimal verbal and manual cues to promote proper body alignment, promote proper body posture and promote proper body mechanics. Progressed resistance, range, repetitions and complexity of movement as indicated. MANUAL THERAPY: (see below for minutes): Joint mobilization and Soft tissue mobilization was utilized and necessary because of the patient's restricted joint motion, painful spasm, loss of articular motion and restricted motion of soft tissue. MODALITIES: (see below for minutes):       To decrease pain     Date: 10/29/18 (visit 7) 10/31/18 (visit 8) 11/01/18 (visit 9) 11/05/18 (visit 10) progress note 11/07/18 (visit 11) 11/12/18 (visit 12) 11/14/18 (visit 13) 11/15/18 (visit 14) 11/19/18 (visit 15) 11/21/18 (visit 16)   Modalities: 15 min 15 min 15 min 15 min  15 min  15 min 15 min 15 min 15 min 15 min    repeat repeat repeat repeat repeat gameready vasopneumatic compression x 15 min to decrease swelling and pain repeat repeat repeat repeat                             Therapeutic Exercise: 40 min 40 min 30 min 35 min 35 min 35 min 15 min 30 min 15 min 30 min   Bike 5 min 5 min 5 min  5 min 5 min  5 min  5 min   Heel slides             Quad set             SLR repeat repeat 10# SLR with 10sec on/off x10 min 10# 10sec on/off x5 min; LAQ 20# 5sec on/off x 5 min repeat repeat repeat repeat     Hip abduction             Stretching Manual stretch into terminal knee extension repeat Repeat; AIS ITB stretch [id number] repeat  Prone quad stretch with RLE on floor [id number] repeat repeat Prone quad stretch [id number]    FTKE Walking march Standing at cable column 25# 3x15            Bridge             Lateral band walks Black x 3 laps Black x 3 laps Grey x3 laps lateral, x 2 laps fwd Kick backs blue 3x15 repeat Green loop x4 laps lateral; kick backs 3x12 B  Green tband x 3 laps lateral, kick backs 3x10 Black loop tband x3 laps Grey CLX x 3 laps lateral, x 2 laps fwd, 3x10 kick back   Single leg deadlifts  3x8 with reach to table           Single leg stance  Step up to BOSU 20x      Single leg deadlift with lateral reach 3x5 B     Incline board repeat 2x1 min calf stretch 2x1 min calf stretch repeat 2x1 min calf stretch repeat 2x1 min calf stretch repeat repeat    Leg press   BOSU squats 2x10 BOSU squat 3x10 BOSU squat 3x10        Step ups    Step back from 6\" step to single leg stance 3x15  Single leg squat from step 10\" 2x8       Stability ball hamstring curls 3x10 3x10           Lunge Reverse with tootie slide 3x10 with manual cueing for frontal plane control repeat  Tootie slide 3x15 Tootie slide with med ball rotation 3x8 repeat  Tootie slide reverse 8# 3x10  Tootie slide 8# 3x10                Proprioceptive Activities:                                                    Manual Therapy: 5 min 5 min 15 min 10 min 10 min 10 min 30 min 10 min 30 min 15 min   Mobilizations repeat repeat Soft tissue mobilization to hamstring, posterior knee, calf To hamstring and posterior ITB repeat repeat Prone STM to posterior knee, hamstring, quad, ITB STM to posterior thigh  STM to quad, ITB, hamstring; patellar mobs STM to posterior hamstring and lateral ITB                Therapeutic Activities:                                                                 HEP: see 10/15/18 flow sheet for specifics  [facility] Portal  Treatment/Session Assessment: Swelling has resolved with no pain on overpressure into knee extension or flexion. · Pain/ Symptoms: Initial:   2/10  \"It's less stiff than it has been since surgery. \" Post Session:  No increase following today's treatment session. ·   Compliance with Program/Exercises: Will assess as treatment progresses. · Recommendations/Intent for next treatment session: \"Next visit will focus on advancements to more challenging activities\".   Total Treatment Duration:  PT Patient Time In/Time Out  Time In: 0845  Time Out: Warner Clark DPT

## 2018-11-30 ENCOUNTER — HOSPITAL ENCOUNTER (OUTPATIENT)
Dept: PHYSICAL THERAPY | Age: 68
Discharge: HOME | End: 2018-11-30
Payer: COMMERCIAL

## 2018-11-30 PROCEDURE — 97140 MANUAL THERAPY 1/> REGIONS: CPT

## 2018-11-30 PROCEDURE — 97110 THERAPEUTIC EXERCISES: CPT

## 2018-11-30 NOTE — PROGRESS NOTES
Rianna Alvarez  : 1950 [address] [facility],[address]  [address] [place] [address].  [phone]   [phone]        OUTPATIENT PHYSICAL THERAPY:Daily Note 2018         ICD-10: Treatment Diagnosis: Pain in left knee (M25.562); Difficulty in walking, not elsewhere classified (R26.2)  Precautions/Allergies:   Patient has no known allergies. Fall Risk Score: 1 (? 5 = High Risk)  MD Orders: evaluate and treat MEDICAL/REFERRING DIAGNOSIS:  Unilateral primary osteoarthritis, left knee [M17.12]  Chondromalacia patellae, left knee [M22.42]  Other tear of lateral meniscus, current injury, left knee, initial encounter [P34.796I]   DATE OF ONSET: date of surgery: 10/12/18 s/p left knee arthroscopy (please refer to surgical notes for specifics. REFERRING PHYSICIAN: Cielo Lomas MD  RETURN PHYSICIAN APPOINTMENT: 10/29/18     INITIAL ASSESSMENT:  Mr. Alicia Peace presents to therapy with decreased joint mobility at the knee into extension and flexion; restricted flexibility of the quadriceps, hamstrings and calf musculature, decreased strength throughout the lower extremity secondary to neuromuscular inhibition, atrophy, and pain. This causes difficulty with weight bearing activities such as walking, getting up from a low chair, going up/down stairs, and recreational activities such as running and playing pickle ball. Skilled therapy is required to return to prior level of function. PROBLEM LIST (Impacting functional limitations):  1. Decreased Strength  2. Decreased ADL/Functional Activities  3. Decreased Ambulation Ability/Technique  4. Decreased Balance  5. Increased Pain  6. Decreased Activity Tolerance  7. Decreased Flexibility/Joint Mobility INTERVENTIONS PLANNED:  1. Balance Exercise  2. Family Education  3. Gait Training  4. Home Exercise Program (HEP)  5. Manual Therapy  6. Neuromuscular Re-education/Strengthening  7.  Range of Motion (ROM)  8. Therapeutic Activites  9. Therapeutic Exercise/Strengthening  10. modalities    TREATMENT PLAN:  Effective Dates: 10/15/2018 TO 1/14/2019 (90 days). Frequency/Duration: 3 times a week for 90 Days  GOALS: (Goals have been discussed and agreed upon with patient.)  Short-Term Functional Goals: Time Frame: 2 weeks MET  1. Patient will be independent with HEP. 2. Patient will improve active knee extension to 0 ° during stance to restore symmetric joint loading during stance phase of gait. 3. Patient will report pain <2/10 with daily activity. Discharge Goals: Time Frame: 6 weeks  1. Patient will improve knee mobility to 2-0-135 ° to normalize joint mobility for symmetric gait on all surfaces and at all speeds. MET  2. Patient will report no pain with progression to independent gym routine to return to prior level of function. PROGRESSING  3. Patient will improve knee extension strength to 5/5 to restore dynamic stability required for stability during gait at all speeds. PROGRESSING  Rehabilitation Potential For Stated Goals: Excellent                HISTORY:   History of Present Injury/Illness (Reason for Referral):  Patient presents to therapy with primary complaint of left knee pain, stiffness, and swelling following above surgical procedure. He reports a 2 year history of intermittent knee pain and stiffness associated with running and activities such as going down stairs that was previously managed with conservative methods. He had been able to return to a limited running regimen but over the summer was beginning to have increased stiffness, increased discomfort for longer periods into his run, and increased pain on the days following his run. He was also having more sensitivity with general daily walking activities. He notes no complications with surgery and has only had to take Aleeve on the afternoon following surgery and the morning after surgery.  Otherwise he has been icing to manage his discomfort. He does report some stiffness and soreness this morning that he attributes to returning to work at his standing desk and doing a little bit of walking over the weekend. He is an active individual and would like to return to activities such as running, playing pickleball and hiking. Past Medical History/Comorbidities:   Mr. Kiera Arroyo  has a past medical history of Acute sinusitis, Arthritis, Bilateral impacted cerumen, Chronic sinusitis, Conductive hearing loss, Esophageal reflux, Hearing loss, Hearing loss due to cerumen impaction, Itching of ear, and Left knee pain. Mr. Kiera Arroyo  has a past surgical history that includes hx lumbar laminectomy; hx tonsillectomy; hx wisdom teeth extraction; hx colonoscopy; and LEFT KNEE ARTHROSCOPY/  MEDIAL AND LATERAL MENISCECTOMY (Left, 10/12/2018). Social History/Living Environment:     Patient lives at home with his wife. Prior Level of Function/Work/Activity:  Patient is a . He enjoys running, playing pickleball and hiking. Dominant Side:         RIGHT  Current Medications:    Current Outpatient Medications:     aspirin delayed-release 81 mg tablet, Take 81 mg by mouth daily. Indications: prevention, Disp: , Rfl:     cetirizine (ZYRTEC) 10 mg tablet, Take 10 mg by mouth every other day., Disp: , Rfl:    Date Last Reviewed:  11/30/2018   EXAMINATION:   Observation/Orthostatic Postural Assessment:          Incision sites are closed and covered with steri strips. There appears to be some blistering at the superior portion of the lateral incision. Girth: Calf: 37.5cm L, 38cm R; Mid patella: 40cm L, 38cm R; Mid thigh: 48cm L, 49cm R  Palpation:          Mild tenderness along incision sites. ROM:     Left knee: 2-120 ° active; 0 to 125 ° passive  Right knee: 1-0-135 °   SLR: 60 ° L; 70 ° R  Hip extension: WNL   11/05/18 update:   Joint mobility is symmetric with uninvolved.     Strength:     Quad set is fair  SLR performed with 5 ° extension lag  Knee extension: 4-/5 L; 5/5 R  SLR: 4/5 L; 5/5 R  Hip abduction: 4/5 L; 5/5 R   11/05/18 update:   Knee extension: 4/5 L  SLR: 4+/5 L  Hip abduction: 4+/5 L   Special Tests:          Not applicable  Neurological Screen:        Light touch sensation is fully intact. Functional Mobility:         Ambulates without assistive device. Ambulates with slight increase in knee flexion during stance phase of gait. Balance:          Maintains single leg stance with eyes open for 5 seconds. Body Structures Involved:  1. Nerves  2. Bones  3. Joints  4. Muscles  5. Ligaments Body Functions Affected:  1. Sensory/Pain  2. Neuromusculoskeletal  3. Movement Related Activities and Participation Affected:  1. Learning and Applying Knowledge  2. General Tasks and Demands  3. Mobility  4. Self Care  5. Domestic Life  6. Interpersonal Interactions and Relationships  7. Community, Social and Civic Life   CLINICAL PRESENTATION:   CLINICAL DECISION MAKING:   Outcome Measure: Tool Used: Lower Extremity Functional Scale (LEFS)  Score:  Initial: 47/80 Most Recent:  Per patient report notes 30% impairments with daily activity. (11/05/18)   Interpretation of Score: 20 questions each scored on a 5 point scale with 0 representing \"extreme difficulty or unable to perform\" and 4 representing \"no difficulty\". The lower the score, the greater the functional disability. 80/80 represents no disability. Minimal detectable change is 9 points. Score 80 79-63 62-48 47-32 31-16 15-1 0   Modifier CH CI CJ CK CL CM CN     ?  Mobility - Walking and Moving Around:     - CURRENT STATUS: CJ - 20%-39% impaired, limited or restricted    - GOAL STATUS: CI - 1%-19% impaired, limited or restricted    - D/C STATUS:  ---------------To be determined---------------    Medical Necessity:   · Patient is expected to demonstrate progress in strength, range of motion, balance and coordination to increase independence with community mobility and return to prior level of function. Reason for Services/Other Comments:  · Patient has demonstrated an improvement in functional level by independent performance of HEP. TREATMENT:   (In addition to Assessment/Re-Assessment sessions the following treatments were rendered)  THERAPEUTIC EXERCISE: (see below for minutes):  Exercises per grid below to improve mobility, strength, balance and coordination. Required minimal verbal and manual cues to promote proper body alignment, promote proper body posture and promote proper body mechanics. Progressed resistance, range, repetitions and complexity of movement as indicated. MANUAL THERAPY: (see below for minutes): Joint mobilization and Soft tissue mobilization was utilized and necessary because of the patient's restricted joint motion, painful spasm, loss of articular motion and restricted motion of soft tissue. MODALITIES: (see below for minutes):       To decrease pain     Date: 10/29/18 (visit 7) 10/31/18 (visit 8) 11/01/18 (visit 9) 11/05/18 (visit 10) progress note 11/07/18 (visit 11) 11/12/18 (visit 12) 11/14/18 (visit 13) 11/15/18 (visit 14) 11/19/18 (visit 15) 11/21/18 (visit 16) 11/30/18 (visit 17)   Modalities: 15 min 15 min 15 min 15 min  15 min  15 min 15 min 15 min 15 min 15 min     repeat repeat repeat repeat repeat gameready vasopneumatic compression x 15 min to decrease swelling and pain repeat repeat repeat repeat                                Therapeutic Exercise: 40 min 40 min 30 min 35 min 35 min 35 min 15 min 30 min 15 min 30 min 30 min   Bike 5 min 5 min 5 min  5 min 5 min  5 min  5 min 5 min   Heel slides              Quad set              SLR repeat repeat 10# SLR with 10sec on/off x10 min 10# 10sec on/off x5 min; LAQ 20# 5sec on/off x 5 min repeat repeat repeat repeat   NMES with 10# 5sec on/off x 10 min   Hip abduction              Stretching Manual stretch into terminal knee extension repeat Repeat; AIS ITB stretch [id number] repeat  Prone quad stretch with RLE on floor [id number] repeat repeat Prone quad stretch [id number]  Prone quad stretch [id number]   FTKE              Walking march Standing at cable column 25# 3x15             Bridge              Lateral band walks Black x 3 laps Black x 3 laps Grey x3 laps lateral, x 2 laps fwd Kick backs blue 3x15 repeat Green loop x4 laps lateral; kick backs 3x12 B  Green tband x 3 laps lateral, kick backs 3x10 Black loop tband x3 laps Grey CLX x 3 laps lateral, x 2 laps fwd, 3x10 kick back    Single leg deadlifts  3x8 with reach to table            Single leg stance  Step up to BOSU 20x      Single leg deadlift with lateral reach 3x5 B      Incline board repeat 2x1 min calf stretch 2x1 min calf stretch repeat 2x1 min calf stretch repeat 2x1 min calf stretch repeat repeat     Leg press   BOSU squats 2x10 BOSU squat 3x10 BOSU squat 3x10         Step ups    Step back from 6\" step to single leg stance 3x15  Single leg squat from step 10\" 2x8        Stability ball hamstring curls 3x10 3x10            Lunge Reverse with tootie slide 3x10 with manual cueing for frontal plane control repeat  Tootie slide 3x15 Tootie slide with med ball rotation 3x8 repeat  Tootie slide reverse 8# 3x10  Tootie slide 8# 3x10 repeat                 Proprioceptive Activities:                                                        Manual Therapy: 5 min 5 min 15 min 10 min 10 min 10 min 30 min 10 min 30 min 15 min 15 min   Mobilizations repeat repeat Soft tissue mobilization to hamstring, posterior knee, calf To hamstring and posterior ITB repeat repeat Prone STM to posterior knee, hamstring, quad, ITB STM to posterior thigh  STM to quad, ITB, hamstring; patellar mobs STM to posterior hamstring and lateral ITB repeat                 Therapeutic Activities:                                                                      HEP: see 10/15/18 flow sheet for specifics  [facility] Portal  Treatment/Session Assessment: Patient performs all exercises with good technique.  .      · Pain/ Symptoms: Initial:   2/10  \"I know it's not ready to start running yet, but it's feeling fine with walking around. \" Post Session:  No increase following today's treatment session. ·   Compliance with Program/Exercises: Will assess as treatment progresses. · Recommendations/Intent for next treatment session: \"Next visit will focus on advancements to more challenging activities\".   Total Treatment Duration:  PT Patient Time In/Time Out  Time In: 0930  Time Out: 0757 [facility]

## 2018-12-03 ENCOUNTER — HOSPITAL ENCOUNTER (OUTPATIENT)
Dept: PHYSICAL THERAPY | Age: 68
Discharge: HOME | End: 2018-12-03
Payer: COMMERCIAL

## 2018-12-03 PROCEDURE — 97110 THERAPEUTIC EXERCISES: CPT

## 2018-12-03 PROCEDURE — 97140 MANUAL THERAPY 1/> REGIONS: CPT

## 2018-12-03 NOTE — PROGRESS NOTES
Roney Castillo  : 1950 [address].  [phone]   [phone]        OUTPATIENT PHYSICAL THERAPY:Daily Note 12/3/2018         ICD-10: Treatment Diagnosis: Pain in left knee (M25.562); Difficulty in walking, not elsewhere classified (R26.2)  Precautions/Allergies:   Patient has no known allergies. Fall Risk Score: 1 (? 5 = High Risk)  MD Orders: evaluate and treat MEDICAL/REFERRING DIAGNOSIS:  Unilateral primary osteoarthritis, left knee [M17.12]  Chondromalacia patellae, left knee [M22.42]  Other tear of lateral meniscus, current injury, left knee, initial encounter [T61.208H]   DATE OF ONSET: date of surgery: 10/12/18 s/p left knee arthroscopy (please refer to surgical notes for specifics. REFERRING PHYSICIAN: Jose Miguel Quick MD  RETURN PHYSICIAN APPOINTMENT: 10/29/18     INITIAL ASSESSMENT:  Mr. Agnieszka Nielson presents to therapy with decreased joint mobility at the knee into extension and flexion; restricted flexibility of the quadriceps, hamstrings and calf musculature, decreased strength throughout the lower extremity secondary to neuromuscular inhibition, atrophy, and pain. This causes difficulty with weight bearing activities such as walking, getting up from a low chair, going up/down stairs, and recreational activities such as running and playing pickle ball. Skilled therapy is required to return to prior level of function. PROBLEM LIST (Impacting functional limitations):  1. Decreased Strength  2. Decreased ADL/Functional Activities  3. Decreased Ambulation Ability/Technique  4. Decreased Balance  5. Increased Pain  6. Decreased Activity Tolerance  7. Decreased Flexibility/Joint Mobility INTERVENTIONS PLANNED:  1. Balance Exercise  2. Family Education  3. Gait Training  4. Home Exercise Program (HEP)  5. Manual Therapy  6. Neuromuscular Re-education/Strengthening  7.  Range of Motion (ROM)  8. Therapeutic Activites  9. Therapeutic Exercise/Strengthening  10. modalities    TREATMENT PLAN:  Effective Dates: 10/15/2018 TO 1/14/2019 (90 days). Frequency/Duration: 3 times a week for 90 Days  GOALS: (Goals have been discussed and agreed upon with patient.)  Short-Term Functional Goals: Time Frame: 2 weeks MET  1. Patient will be independent with HEP. 2. Patient will improve active knee extension to 0 ° during stance to restore symmetric joint loading during stance phase of gait. 3. Patient will report pain <2/10 with daily activity. Discharge Goals: Time Frame: 6 weeks  1. Patient will improve knee mobility to 2-0-135 ° to normalize joint mobility for symmetric gait on all surfaces and at all speeds. MET  2. Patient will report no pain with progression to independent gym routine to return to prior level of function. PROGRESSING  3. Patient will improve knee extension strength to 5/5 to restore dynamic stability required for stability during gait at all speeds. PROGRESSING  Rehabilitation Potential For Stated Goals: Excellent                HISTORY:   History of Present Injury/Illness (Reason for Referral):  Patient presents to therapy with primary complaint of left knee pain, stiffness, and swelling following above surgical procedure. He reports a 2 year history of intermittent knee pain and stiffness associated with running and activities such as going down stairs that was previously managed with conservative methods. He had been able to return to a limited running regimen but over the summer was beginning to have increased stiffness, increased discomfort for longer periods into his run, and increased pain on the days following his run. He was also having more sensitivity with general daily walking activities. He notes no complications with surgery and has only had to take Aleeve on the afternoon following surgery and the morning after surgery.  Otherwise he has been icing to manage his discomfort. He does report some stiffness and soreness this morning that he attributes to returning to work at his standing desk and doing a little bit of walking over the weekend. He is an active individual and would like to return to activities such as running, playing pickleball and hiking. Past Medical History/Comorbidities:   Mr. Jerrell Johnson  has a past medical history of Acute sinusitis, Arthritis, Bilateral impacted cerumen, Chronic sinusitis, Conductive hearing loss, Esophageal reflux, Hearing loss, Hearing loss due to cerumen impaction, Itching of ear, and Left knee pain. Mr. Jerrell Johnson  has a past surgical history that includes hx lumbar laminectomy; hx tonsillectomy; hx wisdom teeth extraction; hx colonoscopy; and LEFT KNEE ARTHROSCOPY/  MEDIAL AND LATERAL MENISCECTOMY (Left, 10/12/2018). Social History/Living Environment:     Patient lives at home with his wife. Prior Level of Function/Work/Activity:  Patient is a . He enjoys running, playing pickleball and hiking. Dominant Side:         RIGHT  Current Medications:    Current Outpatient Medications:     aspirin delayed-release 81 mg tablet, Take 81 mg by mouth daily. Indications: prevention, Disp: , Rfl:     cetirizine (ZYRTEC) 10 mg tablet, Take 10 mg by mouth every other day., Disp: , Rfl:    Date Last Reviewed:  12/3/2018   EXAMINATION:   Observation/Orthostatic Postural Assessment:          Incision sites are closed and covered with steri strips. There appears to be some blistering at the superior portion of the lateral incision. Girth: Calf: 37.5cm L, 38cm R; Mid patella: 40cm L, 38cm R; Mid thigh: 48cm L, 49cm R  Palpation:          Mild tenderness along incision sites. ROM:     Left knee: 2-120 ° active; 0 to 125 ° passive  Right knee: 1-0-135 °   SLR: 60 ° L; 70 ° R  Hip extension: WNL   11/05/18 update:   Joint mobility is symmetric with uninvolved.     Strength:     Quad set is fair  SLR performed with 5 ° extension lag  Knee extension: 4-/5 L; 5/5 R  SLR: 4/5 L; 5/5 R  Hip abduction: 4/5 L; 5/5 R   11/05/18 update:   Knee extension: 4/5 L  SLR: 4+/5 L  Hip abduction: 4+/5 L   Special Tests:          Not applicable  Neurological Screen:        Light touch sensation is fully intact. Functional Mobility:         Ambulates without assistive device. Ambulates with slight increase in knee flexion during stance phase of gait. Balance:          Maintains single leg stance with eyes open for 5 seconds. Body Structures Involved:  1. Nerves  2. Bones  3. Joints  4. Muscles  5. Ligaments Body Functions Affected:  1. Sensory/Pain  2. Neuromusculoskeletal  3. Movement Related Activities and Participation Affected:  1. Learning and Applying Knowledge  2. General Tasks and Demands  3. Mobility  4. Self Care  5. Domestic Life  6. Interpersonal Interactions and Relationships  7. Community, Social and Civic Life   CLINICAL PRESENTATION:   CLINICAL DECISION MAKING:   Outcome Measure: Tool Used: Lower Extremity Functional Scale (LEFS)  Score:  Initial: 47/80 Most Recent:  Per patient report notes 30% impairments with daily activity. (11/05/18)   Interpretation of Score: 20 questions each scored on a 5 point scale with 0 representing \"extreme difficulty or unable to perform\" and 4 representing \"no difficulty\". The lower the score, the greater the functional disability. 80/80 represents no disability. Minimal detectable change is 9 points. Score 80 79-63 62-48 47-32 31-16 15-1 0   Modifier CH CI CJ CK CL CM CN     ?  Mobility - Walking and Moving Around:     - CURRENT STATUS: CJ - 20%-39% impaired, limited or restricted    - GOAL STATUS: CI - 1%-19% impaired, limited or restricted    - D/C STATUS:  ---------------To be determined---------------    Medical Necessity:   · Patient is expected to demonstrate progress in strength, range of motion, balance and coordination to increase independence with community mobility and return to prior level of function. Reason for Services/Other Comments:  · Patient has demonstrated an improvement in functional level by independent performance of HEP. TREATMENT:   (In addition to Assessment/Re-Assessment sessions the following treatments were rendered)  THERAPEUTIC EXERCISE: (see below for minutes):  Exercises per grid below to improve mobility, strength, balance and coordination. Required minimal verbal and manual cues to promote proper body alignment, promote proper body posture and promote proper body mechanics. Progressed resistance, range, repetitions and complexity of movement as indicated. MANUAL THERAPY: (see below for minutes): Joint mobilization and Soft tissue mobilization was utilized and necessary because of the patient's restricted joint motion, painful spasm, loss of articular motion and restricted motion of soft tissue. MODALITIES: (see below for minutes):       To decrease pain     Date: 10/29/18 (visit 7) 10/31/18 (visit 8) 11/01/18 (visit 9) 11/05/18 (visit 10) progress note 11/07/18 (visit 11) 11/12/18 (visit 12) 11/14/18 (visit 13) 11/15/18 (visit 14) 11/19/18 (visit 15) 11/21/18 (visit 16) 11/30/18 (visit 17) 12/03/18 (visit 18)   Modalities: 15 min 15 min 15 min 15 min  15 min  15 min 15 min 15 min 15 min 15 min      repeat repeat repeat repeat repeat gameready vasopneumatic compression x 15 min to decrease swelling and pain repeat repeat repeat repeat                                   Therapeutic Exercise: 40 min 40 min 30 min 35 min 35 min 35 min 15 min 30 min 15 min 30 min 30 min 30 min   Bike 5 min 5 min 5 min  5 min 5 min  5 min  5 min 5 min 5 min   Heel slides               Quad set               SLR repeat repeat 10# SLR with 10sec on/off x10 min 10# 10sec on/off x5 min; LAQ 20# 5sec on/off x 5 min repeat repeat repeat repeat   NMES with 10# 5sec on/off x 10 min NMES 10# with SLR 10sec on/off; with SLS from from step 6\" 5sec on/off   Hip abduction               Stretching Manual stretch into terminal knee extension repeat Repeat; AIS ITB stretch [id number] repeat  Prone quad stretch with RLE on floor [id number] repeat repeat Prone quad stretch [id number]  Prone quad stretch [id number] Prone quad stretch [id number]   FTKE               Walking march Standing at cable column 25# 3x15              Bridge               Lateral band walks Black x 3 laps Black x 3 laps Grey x3 laps lateral, x 2 laps fwd Kick backs blue 3x15 repeat Green loop x4 laps lateral; kick backs 3x12 B  Green tband x 3 laps lateral, kick backs 3x10 Black loop tband x3 laps Grey CLX x 3 laps lateral, x 2 laps fwd, 3x10 kick back     Single leg deadlifts  3x8 with reach to table             Single leg stance  Step up to BOSU 20x      Single leg deadlift with lateral reach 3x5 B    Single leg deadlift with lateral reach 3x5 B   Incline board repeat 2x1 min calf stretch 2x1 min calf stretch repeat 2x1 min calf stretch repeat 2x1 min calf stretch repeat repeat      Leg press   BOSU squats 2x10 BOSU squat 3x10 BOSU squat 3x10          Step ups    Step back from 6\" step to single leg stance 3x15  Single leg squat from step 10\" 2x8         Stability ball hamstring curls 3x10 3x10             Lunge Reverse with tootie slide 3x10 with manual cueing for frontal plane control repeat  Tootie slide 3x15 Tootie slide with med ball rotation 3x8 repeat  Tootie slide reverse 8# 3x10  Tootie slide 8# 3x10 repeat                   Proprioceptive Activities:                                                            Manual Therapy: 5 min 5 min 15 min 10 min 10 min 10 min 30 min 10 min 30 min 15 min 15 min 15 min   Mobilizations repeat repeat Soft tissue mobilization to hamstring, posterior knee, calf To hamstring and posterior ITB repeat repeat Prone STM to posterior knee, hamstring, quad, ITB STM to posterior thigh  STM to quad, ITB, hamstring; patellar mobs STM to posterior hamstring and lateral ITB repeat repeat                  Therapeutic Activities: HEP: see 10/15/18 flow sheet for specifics  [facility]  Treatment/Session Assessment: With visual cueing during single leg squat he is better able to maintain trunk posture over the LLE. · Pain/ Symptoms: Initial:   2/10  \"I had some really good workouts over the weekend. \" Post Session:  No increase following today's treatment session. ·   Compliance with Program/Exercises: Will assess as treatment progresses. · Recommendations/Intent for next treatment session: \"Next visit will focus on advancements to more challenging activities\".   Total Treatment Duration:  PT Patient Time In/Time Out  Time In: 0800  Time Out: 8[address] [facility], [facility]

## 2018-12-05 ENCOUNTER — HOSPITAL ENCOUNTER (OUTPATIENT)
Dept: PHYSICAL THERAPY | Age: 68
Discharge: HOME | End: 2018-12-05
Payer: COMMERCIAL

## 2018-12-05 PROCEDURE — 97140 MANUAL THERAPY 1/> REGIONS: CPT

## 2018-12-05 NOTE — PROGRESS NOTES
Ross Bhatti  : 1950 [id number] [facility]  [address] [place] [address].  [phone]   [phone]        OUTPATIENT PHYSICAL THERAPY:Daily Note and Discontinuation Summary 2018         ICD-10: Treatment Diagnosis: Pain in left knee (M25.562); Difficulty in walking, not elsewhere classified (R26.2)  Precautions/Allergies:   Patient has no known allergies. Fall Risk Score: 1 (? 5 = High Risk)  MD Orders: evaluate and treat MEDICAL/REFERRING DIAGNOSIS:  Unilateral primary osteoarthritis, left knee [M17.12]  Chondromalacia patellae, left knee [M22.42]  Other tear of lateral meniscus, current injury, left knee, initial encounter [S95.823G]   DATE OF ONSET: date of surgery: 10/12/18 s/p left knee arthroscopy (please refer to surgical notes for specifics. REFERRING PHYSICIAN: Lucila Byers MD  RETURN PHYSICIAN APPOINTMENT: 10/29/18     INITIAL ASSESSMENT:  Mr. Steffanie Grijalva presents to therapy with decreased joint mobility at the knee into extension and flexion; restricted flexibility of the quadriceps, hamstrings and calf musculature, decreased strength throughout the lower extremity secondary to neuromuscular inhibition, atrophy, and pain. This causes difficulty with weight bearing activities such as walking, getting up from a low chair, going up/down stairs, and recreational activities such as running and playing pickle ball. Skilled therapy is required to return to prior level of function. PROBLEM LIST (Impacting functional limitations):  1. Decreased Strength  2. Decreased ADL/Functional Activities  3. Decreased Ambulation Ability/Technique  4. Decreased Balance  5. Increased Pain  6. Decreased Activity Tolerance  7. Decreased Flexibility/Joint Mobility INTERVENTIONS PLANNED:  1. Balance Exercise  2. Family Education  3. Gait Training  4. Home Exercise Program (HEP)  5. Manual Therapy  6.  Neuromuscular Re-education/Strengthening  7. Range of Motion (ROM)  8. Therapeutic Activites  9. Therapeutic Exercise/Strengthening  10. modalities    TREATMENT PLAN:  Effective Dates: 10/15/2018 TO 1/14/2019 (90 days). Frequency/Duration: 3 times a week for 90 Days  GOALS: (Goals have been discussed and agreed upon with patient.)  Short-Term Functional Goals: Time Frame: 2 weeks MET  1. Patient will be independent with HEP. 2. Patient will improve active knee extension to 0 ° during stance to restore symmetric joint loading during stance phase of gait. 3. Patient will report pain <2/10 with daily activity. Discharge Goals: Time Frame: 6 weeks  1. Patient will improve knee mobility to 2-0-135 ° to normalize joint mobility for symmetric gait on all surfaces and at all speeds. MET  2. Patient will report no pain with progression to independent gym routine to return to prior level of function. PROGRESSING  3. Patient will improve knee extension strength to 5/5 to restore dynamic stability required for stability during gait at all speeds. PROGRESSING  Rehabilitation Potential For Stated Goals: Excellent                HISTORY:   History of Present Injury/Illness (Reason for Referral):  Patient presents to therapy with primary complaint of left knee pain, stiffness, and swelling following above surgical procedure. He reports a 2 year history of intermittent knee pain and stiffness associated with running and activities such as going down stairs that was previously managed with conservative methods. He had been able to return to a limited running regimen but over the summer was beginning to have increased stiffness, increased discomfort for longer periods into his run, and increased pain on the days following his run. He was also having more sensitivity with general daily walking activities. He notes no complications with surgery and has only had to take Aleeve on the afternoon following surgery and the morning after surgery. Otherwise he has been icing to manage his discomfort. He does report some stiffness and soreness this morning that he attributes to returning to work at his standing desk and doing a little bit of walking over the weekend. He is an active individual and would like to return to activities such as running, playing pickleball and hiking. Past Medical History/Comorbidities:   Mr. Nehemiah Guillen  has a past medical history of Acute sinusitis, Arthritis, Bilateral impacted cerumen, Chronic sinusitis, Conductive hearing loss, Esophageal reflux, Hearing loss, Hearing loss due to cerumen impaction, Itching of ear, and Left knee pain. Mr. Nehemiah Guillen  has a past surgical history that includes hx lumbar laminectomy; hx tonsillectomy; hx wisdom teeth extraction; hx colonoscopy; and LEFT KNEE ARTHROSCOPY/  MEDIAL AND LATERAL MENISCECTOMY (Left, 10/12/2018). Social History/Living Environment:     Patient lives at home with his wife. Prior Level of Function/Work/Activity:  Patient is a . He enjoys running, playing pickleball and hiking. Dominant Side:         RIGHT  Current Medications:    Current Outpatient Medications:     aspirin delayed-release 81 mg tablet, Take 81 mg by mouth daily. Indications: prevention, Disp: , Rfl:     cetirizine (ZYRTEC) 10 mg tablet, Take 10 mg by mouth every other day., Disp: , Rfl:    Date Last Reviewed:  12/5/2018   EXAMINATION:   Observation/Orthostatic Postural Assessment:          Incision sites are closed and covered with steri strips. There appears to be some blistering at the superior portion of the lateral incision. Girth: Calf: 37.5cm L, 38cm R; Mid patella: 40cm L, 38cm R; Mid thigh: 48cm L, 49cm R  Palpation:          Mild tenderness along incision sites. ROM:     Left knee: 2-120 ° active; 0 to 125 ° passive  Right knee: 1-0-135 °   SLR: 60 ° L; 70 ° R  Hip extension: WNL   11/05/18 update:   Joint mobility is symmetric with uninvolved.     Strength:     Quad set is fair  SLR performed with 5 ° extension lag  Knee extension: 4-/5 L; 5/5 R  SLR: 4/5 L; 5/5 R  Hip abduction: 4/5 L; 5/5 R   11/05/18 update:   Knee extension: 4/5 L  SLR: 4+/5 L  Hip abduction: 4+/5 L   Special Tests:          Not applicable  Neurological Screen:        Light touch sensation is fully intact. Functional Mobility:         Ambulates without assistive device. Ambulates with slight increase in knee flexion during stance phase of gait. Balance:          Maintains single leg stance with eyes open for 5 seconds. Body Structures Involved:  1. Nerves  2. Bones  3. Joints  4. Muscles  5. Ligaments Body Functions Affected:  1. Sensory/Pain  2. Neuromusculoskeletal  3. Movement Related Activities and Participation Affected:  1. Learning and Applying Knowledge  2. General Tasks and Demands  3. Mobility  4. Self Care  5. Domestic Life  6. Interpersonal Interactions and Relationships  7. Community, Social and Civic Life   CLINICAL PRESENTATION:   CLINICAL DECISION MAKING:   Outcome Measure: Tool Used: Lower Extremity Functional Scale (LEFS)  Score:  Initial: 47/80 Most Recent:  Per patient report notes 30% impairments with daily activity. (11/05/18)   Interpretation of Score: 20 questions each scored on a 5 point scale with 0 representing \"extreme difficulty or unable to perform\" and 4 representing \"no difficulty\". The lower the score, the greater the functional disability. 80/80 represents no disability. Minimal detectable change is 9 points. Score 80 79-63 62-48 47-32 31-16 15-1 0   Modifier CH CI CJ CK CL CM CN     ?  Mobility - Walking and Moving Around:     - CURRENT STATUS: CJ - 20%-39% impaired, limited or restricted    - GOAL STATUS: CI - 1%-19% impaired, limited or restricted    - D/C STATUS:  ---------------To be determined---------------    Medical Necessity:   · Patient is expected to demonstrate progress in strength, range of motion, balance and coordination to increase independence with community mobility and return to prior level of function. Reason for Services/Other Comments:  · Patient has demonstrated an improvement in functional level by independent performance of HEP. TREATMENT:   (In addition to Assessment/Re-Assessment sessions the following treatments were rendered)  THERAPEUTIC EXERCISE: (see below for minutes):  Exercises per grid below to improve mobility, strength, balance and coordination. Required minimal verbal and manual cues to promote proper body alignment, promote proper body posture and promote proper body mechanics. Progressed resistance, range, repetitions and complexity of movement as indicated. MANUAL THERAPY: (see below for minutes): Joint mobilization and Soft tissue mobilization was utilized and necessary because of the patient's restricted joint motion, painful spasm, loss of articular motion and restricted motion of soft tissue. MODALITIES: (see below for minutes):       To decrease pain     Date: 11/05/18 (visit 10) progress note 11/07/18 (visit 11) 11/12/18 (visit 12) 11/14/18 (visit 13) 11/15/18 (visit 14) 11/19/18 (visit 15) 11/21/18 (visit 16) 11/30/18 (visit 17) 12/03/18 (visit 18) 12/05/18 (visit 19)     Modalities: 15 min  15 min  15 min 15 min 15 min 15 min 15 min         repeat repeat gameready vasopneumatic compression x 15 min to decrease swelling and pain repeat repeat repeat repeat                                      Therapeutic Exercise: 35 min 35 min 35 min 15 min 30 min 15 min 30 min 30 min 30 min      Bike  5 min 5 min  5 min  5 min 5 min 5 min      Heel slides               Quad set               SLR 10# 10sec on/off x5 min; LAQ 20# 5sec on/off x 5 min repeat repeat repeat repeat   NMES with 10# 5sec on/off x 10 min NMES 10# with SLR 10sec on/off; with SLS from from step 6\" 5sec on/off      Hip abduction               Stretching repeat  Prone quad stretch with RLE on floor [id number] repeat repeat Prone quad stretch [id number] Prone quad stretch [id number] Prone quad stretch [id number]      FTKE               Walking march               Bridge               Lateral band walks Kick backs blue 3x15 repeat Green loop x4 laps lateral; kick backs 3x12 B  Green tband x 3 laps lateral, kick backs 3x10 Black loop tband x3 laps Grey CLX x 3 laps lateral, x 2 laps fwd, 3x10 kick back        Single leg deadlifts               Single leg stance     Single leg deadlift with lateral reach 3x5 B    Single leg deadlift with lateral reach 3x5 B      Incline board repeat 2x1 min calf stretch repeat 2x1 min calf stretch repeat repeat         Leg press BOSU squat 3x10 BOSU squat 3x10             Step ups Step back from 6\" step to single leg stance 3x15  Single leg squat from step 10\" 2x8            Stability ball hamstring curls               Lunge Tootie slide 3x15 Tootie slide with med ball rotation 3x8 repeat  Tootie slide reverse 8# 3x10  Tootie slide 8# 3x10 repeat                      Proprioceptive Activities:                                                            Manual Therapy: 10 min 10 min 10 min 30 min 10 min 30 min 15 min 15 min 15 min 45 min     Mobilizations To hamstring and posterior ITB repeat repeat Prone STM to posterior knee, hamstring, quad, ITB STM to posterior thigh  STM to quad, ITB, hamstring; patellar mobs STM to posterior hamstring and lateral ITB repeat repeat Soft tissue mobilization to L quad, ITB, adductor canal                    Therapeutic Activities:                                                                           HEP: see 10/15/18 flow sheet for specifics  [facility] Portal  Treatment/Session Assessment: Increased focus on quad mobility today with noted tender points present through distal adductor musculature and mid portion of the quadriceps. Joint mobility remains full into extension and flexion.  He is progressing well with his independent gym routine and will follow up with therapy 2-3x over the next 4-6 weeks to continue to progress gym routine. 12/05/19 addendum: Patient progressed well with therapy and did not return for further treatment. · Pain/ Symptoms: Initial:   2/10  \"The knee has been feeling really good. It's not as tight with going down stairs. My quad has been really sore yesterday and today though. \" Post Session:  No increase following today's treatment session. ·   Compliance with Program/Exercises: Will assess as treatment progresses. · Recommendations/Intent for next treatment session: \"Next visit will focus on advancements to more challenging activities\".   Total Treatment Duration:  PT Patient Time In/Time Out  Time In: 0800  Time Out: [address] [facility], [facility]

## 2020-05-20 ENCOUNTER — HOSPITAL ENCOUNTER (OUTPATIENT)
Dept: PHYSICAL THERAPY | Age: 70
Discharge: HOME | End: 2020-05-20
Payer: MEDICARE

## 2020-05-20 PROCEDURE — 97110 THERAPEUTIC EXERCISES: CPT

## 2020-05-20 PROCEDURE — 97161 PT EVAL LOW COMPLEX 20 MIN: CPT

## 2020-05-20 PROCEDURE — 97140 MANUAL THERAPY 1/> REGIONS: CPT

## 2020-05-20 NOTE — PROGRESS NOTES
Tiesha Petty  : 1950  [place]  Secondary: [place] @ [address], [place].  [phone]   [phone]      OUTPATIENT PHYSICAL THERAPY: Daily Treatment Note  2020   Pain in right foot (M79.671), Stiffness of right foot, not elsewhere classified (M25.674), Pain in left foot (M79.672) and Stiffness of left foot, not elsewhere classified (M25.675)  Effective Dates: 2020 TO 2020 (90 days). Frequency/Duration: 2 times a week for 90 Days  GOALS: (Goals have been discussed and agreed upon with patient.)  Short-Term Functional Goals: Time Frame: 2 weeks  1. Patient will be independent with HEP. 2. Patient will be able to walk for 20 minutes without symptoms of numbness. Discharge Goals: Time Frame: 4 weeks  1. Patient will decrease forefoot sensitivity to allow him to walk barefoot without pain. 2. Patient will have no tenderness to palpation of the intrinsic foot musculature to improve foot mobility. 3. Patient will have no numbness through the toes when sleeping at night.    _________________________________________________________________________  Pre-treatment Symptoms/Complaints:  See initial evaluation  Pain: Initial: 1/10 Post Session:  1/10   Medications Last Reviewed:  2020  Updated Objective Findings:  Below measures from initial evaluation unless otherwise noted. Observation/Orthostatic Postural Assessment:    No gross deformity or atrophy is noted. Palpation:    Tender bilaterally through plantar intrinsic foot musculature.  Tender to 2-4th MTP's bilaterally  ROM:    Ankle DF is WNL  Forefoot inversion is mildly restricted bilaterally  Toe mobility is WNL  Slump and SLR testing are negative  Strength:   Myotomal strength is 5/5  Great toe extension is 5/5  Ankle PF is 5/5   Ankle inversion is 5/5   Special Tests:    Neural tension is (-) throughout. Mildly provocative click noted for neuroma on the L between first and second MTP. Neurological Screen:  Light touch and pinprick sensation are WNL     TREATMENT:   THERAPEUTIC ACTIVITY: ( see below for minutes): Therapeutic activities per grid below to improve mobility, strength, balance and coordination. Required minimal visual, verbal, manual and tactile cues to improve independence and safety with daily activities . THERAPEUTIC EXERCISE: (see below for minutes):  Exercises per grid below to improve mobility, strength, balance and coordination. Required minimal verbal and manual cues to promote proper body alignment, promote proper body posture and promote proper body mechanics. Progressed resistance, range, repetitions and complexity of movement as indicated. MANUAL THERAPY: (see below for minutes): Joint mobilization and Soft tissue mobilization was utilized and necessary because of the patient's restricted joint motion, painful spasm, loss of articular motion and restricted motion of soft tissue. MODALITIES: (see below for minutes):      to decrease pain    SELF CARE: (see below for minutes): Procedure(s) (per grid) utilized to improve and/or restore self-care/home management as related to dressing, bathing and grooming. Required minimal verbal cueing to facilitate activities of daily living skills and compensatory activities. Date: 5/20/20       Modalities:                                Therapeutic Exercise: 10 min       Toe yoga Lift-spread-place 2-3 min       Rolling To plantar surface of foot reviewed       Calf stretching reviewed                               Proprioceptive Activities:                                Manual Therapy: 15 min       Soft tissue mobilization To plantar surface of the foot B               Therapeutic Activities:                                  HEP: see 5/20/20 flow sheet for specifics.     [facility] Portal  Treatment/Session Summary:    · Response to Treatment:  Patient is independent with performance of above described home program.  · Communication/Consultation:  None today  · Equipment provided today:  None today  · Recommendations/Intent for next treatment session: Next visit will focus on progression of mobility and relief of pain.     Total Treatment Billable Duration:  45 minutes  PT Patient Time In/Time Out  Time In: 0845  Time Out: FIORDALIZA Clark    Future Appointments   Date Time Provider Daisy Guevara   5/26/2020  8:00 AM Kang Herrera DPT [facility]   5/28/2020  2:00 PM Kang Herrera DPT [facility]   6/15/2020  1:55 PM Fernando Ho MD [id number] [place]

## 2020-05-20 NOTE — THERAPY EVALUATION
Gualberto Hearing  : 1950 [address]  [address], [address] [facility]  [phone]   [phone]        OUTPATIENT PHYSICAL THERAPY:Initial Assessment 2020         ICD-10: Treatment Diagnosis: Pain in right foot (M79.671), Stiffness of right foot, not elsewhere classified (M25.674), Pain in left foot (M79.672) and Stiffness of left foot, not elsewhere classified (M25.675)  Precautions/Allergies:   Patient has no known allergies. Fall Risk Score:     Ambulatory/Rehab Services H2 Model Falls Risk Assessment    Risk Factors:       (1)  Gender [Male] Ability to Rise from Chair:       (0)  Ability to rise in a single movement    Falls Prevention Plan:       No modifications necessary   Total: (5 or greater = High Risk): 1     [facility] [employer]. All Rights Reserved. [facility] [employer] Patent #[id number]. Federal Law prohibits the replication, distribution or use without written permission from [facility] [employer]     MD Orders: evaluate and treat MEDICAL/REFERRING DIAGNOSIS:  Foot pain, bilateral [id number], M79.672]   DATE OF ONSET: 3/1/20  REFERRING PHYSICIAN: Fede Bennett 2905: not currently scheduled     INITIAL ASSESSMENT:  Mr. Joe Carney presents to therapy with bilateral forefoot pain that is consistent with metatarsalgia. He does have numbness and tingling in the 2-4th toes of the right foot, however this is unable to be provoked with clinical testing of sensation and neural mobility. He has good mobility through the ankle and lower leg musculature. He has increased tenderness through the intrinsic musculature of both feet. Impairments limit tolerance to walking and standing activities. PROBLEM LIST (Impacting functional limitations):  1. Decreased Strength  2. Increased Pain  3. Decreased Activity Tolerance  4.  Decreased Flexibility/Joint Mobility INTERVENTIONS PLANNED:  1. Balance Exercise  2. Family Education  3. Gait Training  4. Home Exercise Program (HEP)  5. Manual Therapy  6. Neuromuscular Re-education/Strengthening  7. Range of Motion (ROM)  8. Therapeutic Activites  9. Therapeutic Exercise/Strengthening  10. modalities    TREATMENT PLAN:  Effective Dates: 5/20/2020 TO 8/18/2020 (90 days). Frequency/Duration: 2 times a week for 90 Days  GOALS: (Goals have been discussed and agreed upon with patient.)  Short-Term Functional Goals: Time Frame: 2 weeks  1. Patient will be independent with HEP. 2. Patient will be able to walk for 20 minutes without symptoms of numbness. Discharge Goals: Time Frame: 4 weeks  1. Patient will decrease forefoot sensitivity to allow him to walk barefoot without pain. 2. Patient will have no tenderness to palpation of the intrinsic foot musculature to improve foot mobility. 3. Patient will have no numbness through the toes when sleeping at night. Rehabilitation Potential For Stated Goals: Excellent  Regarding Pallavi Chandra's therapy, I certify that the treatment plan above will be carried out by a therapist or under their direction. Thank you for this referral,  Nunu Brown DPT       Referring Physician Signature: Radha Mata*              Date                      HISTORY:   History of Present Injury/Illness (Reason for Referral):  Patient presents to therapy with primary complaint of bilateral forefoot pain. He denies any specific mechanism of injury but began to notice increased numbness through the 2-4th toes of the right foot. This is particularly present when sleeping at night with the pressure from a bed sheet. He also reports a feeling of fullness and thickness through the plantar surface of both forefeet. This is particularly noticed when walking barefoot.    Past Medical History/Comorbidities:   Mr. May Peabody  has a past medical history of Acute sinusitis, Arthritis, Bilateral impacted cerumen, Chronic sinusitis, Conductive hearing loss, Esophageal reflux, Hearing loss, Hearing loss due to cerumen impaction, Itching of ear, and Left knee pain. Mr. Lauro Lesch  has a past surgical history that includes hx lumbar laminectomy; hx tonsillectomy; hx wisdom teeth extraction; and hx colonoscopy. Social History/Living Environment:     Patient lives at home with his wife. Prior Level of Function/Work/Activity:  Patient is a retired . He enjoys running and exercisingg. Dominant Side:         RIGHT  Current Medications:    Current Outpatient Medications:     aspirin delayed-release 81 mg tablet, Take 81 mg by mouth daily. Indications: prevention, Disp: , Rfl:     cetirizine (ZYRTEC) 10 mg tablet, Take 10 mg by mouth every other day., Disp: , Rfl:    Date Last Reviewed:  5/20/2020   # of Personal Factors/Comorbidities that affect the Plan of Care: 0: LOW COMPLEXITY   EXAMINATION:   Observation/Orthostatic Postural Assessment:    No gross deformity or atrophy is noted. Palpation:    Tender bilaterally through plantar intrinsic foot musculature. Tender to 2-4th MTP's bilaterally  ROM:    Ankle DF is WNL  Forefoot inversion is mildly restricted bilaterally  Toe mobility is WNL  Slump and SLR testing are negative  Strength:   Myotomal strength is 5/5  Great toe extension is 5/5  Ankle PF is 5/5   Ankle inversion is 5/5   Special Tests:    Neural tension is (-) throughout. Mildly provocative click noted for neuroma on the L between first and second MTP. Neurological Screen:  Light touch and pinprick sensation are WNL  Functional Mobility:   WNL  Balance: WNL   Body Structures Involved:  1. Nerves  2. Bones  3. Joints  4. Muscles  5. Ligaments Body Functions Affected:  1. Sensory/Pain  2. Neuromusculoskeletal  3. Movement Related Activities and Participation Affected:  1. General Tasks and Demands  2. Mobility  3. Self Care  4. Domestic Life  5. Interpersonal Interactions and Relationships  6.  Community, Social and Civic Life   # of elements that affect the Plan of Care: 1-2: LOW COMPLEXITY   CLINICAL PRESENTATION:   Presentation: Stable and uncomplicated: LOW COMPLEXITY   CLINICAL DECISION MAKING:   Tool Used: Lower Extremity Functional Scale (LEFS)  Score:  Initial: will fill out next visit/80 Most Recent: X/80 (Date: -- )   Interpretation of Score: 20 questions each scored on a 5 point scale with 0 representing \"extreme difficulty or unable to perform\" and 4 representing \"no difficulty\". The lower the score, the greater the functional disability. 80/80 represents no disability. Minimal detectable change is 9 points. Medical Necessity:   · Patient is expected to demonstrate progress in strength, range of motion, balance and coordination  ·  to increase independence with community mobility and return to prior level of function  · .  Reason for Services/Other Comments:  · Patient has demonstrated an improvement in functional level by independent performance of HEP.    · .   Use of outcome tool(s) and clinical judgement create a POC that gives a: Clear prediction of patient's progress: LOW COMPLEXITY     Total Treatment Duration:  PT Patient Time In/Time Out  Time In: 0845  Time Out: [address] [facility], [facility]

## 2020-05-26 ENCOUNTER — HOSPITAL ENCOUNTER (OUTPATIENT)
Dept: PHYSICAL THERAPY | Age: 70
Discharge: HOME | End: 2020-05-26
Payer: MEDICARE

## 2020-05-26 PROCEDURE — 97110 THERAPEUTIC EXERCISES: CPT

## 2020-05-26 PROCEDURE — 97140 MANUAL THERAPY 1/> REGIONS: CPT

## 2020-05-26 NOTE — PROGRESS NOTES
Molly Bose  : 1950  [place]  Secondary: [place] @ [address], [facility] [place].  [phone]   [phone]      OUTPATIENT PHYSICAL THERAPY: Daily Treatment Note  2020   Pain in right foot (M79.671), Stiffness of right foot, not elsewhere classified (M25.674), Pain in left foot (M79.672) and Stiffness of left foot, not elsewhere classified (M25.675)  Effective Dates: 2020 TO 2020 (90 days). Frequency/Duration: 2 times a week for 90 Days  GOALS: (Goals have been discussed and agreed upon with patient.)  Short-Term Functional Goals: Time Frame: 2 weeks  1. Patient will be independent with HEP. 2. Patient will be able to walk for 20 minutes without symptoms of numbness. Discharge Goals: Time Frame: 4 weeks  1. Patient will decrease forefoot sensitivity to allow him to walk barefoot without pain. 2. Patient will have no tenderness to palpation of the intrinsic foot musculature to improve foot mobility. 3. Patient will have no numbness through the toes when sleeping at night.    _________________________________________________________________________  Pre-treatment Symptoms/Complaints:  Notes continuing to feel a \"bar\" across the forefoot when walking barefoot. Feeling slight improvements in sensation. Pain: Initial: 1/10 Post Session:  1/10   Medications Last Reviewed:  2020  Updated Objective Findings:  Below measures from initial evaluation unless otherwise noted. Observation/Orthostatic Postural Assessment:    No gross deformity or atrophy is noted. Palpation:    Tender bilaterally through plantar intrinsic foot musculature.  Tender to 2-4th MTP's bilaterally  ROM:    Ankle DF is WNL  Forefoot inversion is mildly restricted bilaterally  Toe mobility is WNL  Slump and SLR testing are negative  Strength:   Myotomal strength is 5/5  Great toe extension is 5/5  Ankle PF is 5/5   Ankle inversion is 5/5   Special Tests:    Neural tension is (-) throughout. Mildly provocative click noted for neuroma on the L between first and second MTP. Neurological Screen:  Light touch and pinprick sensation are WNL     TREATMENT:   THERAPEUTIC ACTIVITY: ( see below for minutes): Therapeutic activities per grid below to improve mobility, strength, balance and coordination. Required minimal visual, verbal, manual and tactile cues to improve independence and safety with daily activities . THERAPEUTIC EXERCISE: (see below for minutes):  Exercises per grid below to improve mobility, strength, balance and coordination. Required minimal verbal and manual cues to promote proper body alignment, promote proper body posture and promote proper body mechanics. Progressed resistance, range, repetitions and complexity of movement as indicated. MANUAL THERAPY: (see below for minutes): Joint mobilization and Soft tissue mobilization was utilized and necessary because of the patient's restricted joint motion, painful spasm, loss of articular motion and restricted motion of soft tissue. MODALITIES: (see below for minutes):      to decrease pain    SELF CARE: (see below for minutes): Procedure(s) (per grid) utilized to improve and/or restore self-care/home management as related to dressing, bathing and grooming. Required minimal verbal cueing to facilitate activities of daily living skills and compensatory activities.      Date: 5/20/20 5/26/20 (visit 2)      Modalities:                                Therapeutic Exercise: 10 min 15 min      Toe yoga Lift-spread-place 2-3 min reviewed      Rolling To plantar surface of foot reviewed reviewed      Calf stretching reviewed                               Proprioceptive Activities:                                Manual Therapy: 15 min 30 min      Soft tissue mobilization To plantar surface of the foot B repeat              Therapeutic Activities:                                  HEP: see 5/20/20 flow sheet for specifics. [employer] Portal  Treatment/Session Summary:    · Response to Treatment:  Continues to have tenderness through the intrinsic musculature of the foot. · Communication/Consultation:  None today  · Equipment provided today:  None today  · Recommendations/Intent for next treatment session: Next visit will focus on progression of mobility and relief of pain.     Total Treatment Billable Duration:  45 minutes  PT Patient Time In/Time Out  Time In: 0800  Time Out: [address]    Future Appointments   Date Time Provider Daisy Guevara   5/28/2020  2:00 PM Ceci Nguyen DPT [facility]   6/15/2020  2:00 PM Yakov Law MD [address] [facility] [address] [facility] [address] [place]

## 2020-05-28 ENCOUNTER — HOSPITAL ENCOUNTER (OUTPATIENT)
Dept: PHYSICAL THERAPY | Age: 70
Discharge: HOME | End: 2020-05-28
Payer: MEDICARE

## 2020-05-28 PROCEDURE — 97140 MANUAL THERAPY 1/> REGIONS: CPT

## 2020-05-28 PROCEDURE — 97110 THERAPEUTIC EXERCISES: CPT

## 2020-05-28 NOTE — PROGRESS NOTES
Monroe Lakhani  : 1950  [place]  Secondary: [place] @ [address], [address] [place] [address]  [phone]   [phone]      OUTPATIENT PHYSICAL THERAPY: Daily Treatment Note and Discharge  2020   Pain in right foot (M79.671), Stiffness of right foot, not elsewhere classified (M25.674), Pain in left foot (M79.672) and Stiffness of left foot, not elsewhere classified (M25.675)  Effective Dates: 2020 TO 2020 (90 days). Frequency/Duration: 2 times a week for 90 Days  GOALS: (Goals have been discussed and agreed upon with patient.)  Short-Term Functional Goals: Time Frame: 2 weeks  1. Patient will be independent with HEP. 2. Patient will be able to walk for 20 minutes without symptoms of numbness. Discharge Goals: Time Frame: 4 weeks  1. Patient will decrease forefoot sensitivity to allow him to walk barefoot without pain. 2. Patient will have no tenderness to palpation of the intrinsic foot musculature to improve foot mobility. 3. Patient will have no numbness through the toes when sleeping at night.    _________________________________________________________________________  Pre-treatment Symptoms/Complaints:  Continues to feel better with massage and self rolling with ball at home. No significant change since previous session. Pain: Initial: 1/10 Post Session:  1/10   Medications Last Reviewed:  2020  Updated Objective Findings:  Below measures from initial evaluation unless otherwise noted. Observation/Orthostatic Postural Assessment:    No gross deformity or atrophy is noted. Palpation:    Tender bilaterally through plantar intrinsic foot musculature.  Tender to 2-4th MTP's bilaterally  ROM:    Ankle DF is WNL  Forefoot inversion is mildly restricted bilaterally  Toe mobility is WNL  Slump and SLR testing are negative  Strength:   Myotomal strength is 5/5  Great toe extension is 5/5  Ankle PF is 5/5   Ankle inversion is 5/5   Special Tests:    Neural tension is (-) throughout. Mildly provocative click noted for neuroma on the L between first and second MTP. Neurological Screen:  Light touch and pinprick sensation are WNL     TREATMENT:   THERAPEUTIC ACTIVITY: ( see below for minutes): Therapeutic activities per grid below to improve mobility, strength, balance and coordination. Required minimal visual, verbal, manual and tactile cues to improve independence and safety with daily activities . THERAPEUTIC EXERCISE: (see below for minutes):  Exercises per grid below to improve mobility, strength, balance and coordination. Required minimal verbal and manual cues to promote proper body alignment, promote proper body posture and promote proper body mechanics. Progressed resistance, range, repetitions and complexity of movement as indicated. MANUAL THERAPY: (see below for minutes): Joint mobilization and Soft tissue mobilization was utilized and necessary because of the patient's restricted joint motion, painful spasm, loss of articular motion and restricted motion of soft tissue. MODALITIES: (see below for minutes):      to decrease pain    SELF CARE: (see below for minutes): Procedure(s) (per grid) utilized to improve and/or restore self-care/home management as related to dressing, bathing and grooming. Required minimal verbal cueing to facilitate activities of daily living skills and compensatory activities.      Date: 5/20/20 5/26/20 (visit 2) 5/28/20 (visit 3)     Modalities:                                Therapeutic Exercise: 10 min 15 min 15 min     Toe yoga Lift-spread-place 2-3 min reviewed reviewed     Rolling To plantar surface of foot reviewed reviewed reviewed     Calf stretching reviewed          Discussed progression with aerobic activity with walking/air dyne                     Proprioceptive Activities:                                Manual Therapy: 15 min 30 min 30 min     Soft tissue mobilization To plantar surface of the foot B repeat repeat             Therapeutic Activities:                                  HEP: see 5/20/20 flow sheet for specifics. [employer] Portal  Treatment/Session Summary:    · Response to Treatment:  Tenderness is decreased to the plantar surface of the foot but continue to be unable to provoke symptoms with palpation, positioning, or movement. Will place therapy on hold until follow up with MD and NCV. 9/9/20 addendum: Patient is discharged as NCV showed only slight evidence of peripheral neuropathy. Further therapy is not currently indicated. · Communication/Consultation:  None today  · Equipment provided today:  None today  · Recommendations/Intent for next treatment session: Next visit will focus on progression of mobility and relief of pain.     Total Treatment Billable Duration:  45 minutes  PT Patient Time In/Time Out  Time In: 1400  Time Out: [address] [facility] 83, DPT    Future Appointments   Date Time Provider Daisy Guevara   6/2/2020  8:45 AM Denilson Aly DPT [facility]   6/4/2020  8:45 AM Denilson Aly DPT [facility]   6/9/2020  8:45 AM Denilson Aly DPT [facility]   6/11/2020  8:45 AM Denilson Aly DPT [facility]   6/15/2020  2:00 PM Gage Savage MD [facility]   6/16/2020  8:45 AM Denilson Aly DPT [facility]   6/18/2020  8:45 AM Denilson Aly DPT [facility]   6/23/2020  8:45 AM Denilson Aly DPT [facility]   6/25/2020  8:45 AM Denilson Aly DPT [facility]   6/30/2020  8:45 AM Denilson Aly DPT [facility]

## 2020-06-02 ENCOUNTER — APPOINTMENT (OUTPATIENT)
Dept: PHYSICAL THERAPY | Age: 70
End: 2020-06-02

## 2020-06-04 ENCOUNTER — APPOINTMENT (OUTPATIENT)
Dept: PHYSICAL THERAPY | Age: 70
End: 2020-06-04

## 2020-06-09 ENCOUNTER — APPOINTMENT (OUTPATIENT)
Dept: PHYSICAL THERAPY | Age: 70
End: 2020-06-09

## 2020-06-11 ENCOUNTER — APPOINTMENT (OUTPATIENT)
Dept: PHYSICAL THERAPY | Age: 70
End: 2020-06-11

## 2020-06-16 ENCOUNTER — APPOINTMENT (OUTPATIENT)
Dept: PHYSICAL THERAPY | Age: 70
End: 2020-06-16

## 2020-06-18 ENCOUNTER — APPOINTMENT (OUTPATIENT)
Dept: PHYSICAL THERAPY | Age: 70
End: 2020-06-18

## 2020-06-23 ENCOUNTER — APPOINTMENT (OUTPATIENT)
Dept: PHYSICAL THERAPY | Age: 70
End: 2020-06-23

## 2020-06-25 ENCOUNTER — APPOINTMENT (OUTPATIENT)
Dept: PHYSICAL THERAPY | Age: 70
End: 2020-06-25

## 2020-06-30 ENCOUNTER — APPOINTMENT (OUTPATIENT)
Dept: PHYSICAL THERAPY | Age: 70
End: 2020-06-30

## 2020-09-02 PROBLEM — G60.9 HEREDITARY AND IDIOPATHIC PERIPHERAL NEUROPATHY: Status: ACTIVE | Noted: 2020-06-24

## 2020-09-02 PROBLEM — Z80.0 FAMILY HISTORY OF COLON CANCER IN FATHER: Status: ACTIVE | Noted: 2020-07-06

## 2020-09-02 PROBLEM — K40.90 NON-RECURRENT UNILATERAL INGUINAL HERNIA WITHOUT OBSTRUCTION OR GANGRENE: Status: ACTIVE | Noted: 2019-03-27

## 2020-09-02 PROBLEM — Z86.010 PERSONAL HISTORY OF COLONIC POLYPS: Status: ACTIVE | Noted: 2020-07-06

## 2020-09-02 PROBLEM — R10.31 RIGHT GROIN PAIN: Status: ACTIVE | Noted: 2019-07-03

## 2020-09-02 PROBLEM — Z80.42 FAMILY HISTORY OF PROSTATE CANCER IN FATHER: Status: ACTIVE | Noted: 2020-07-06

## 2020-11-12 ENCOUNTER — HOSPITAL ENCOUNTER (OUTPATIENT)
Dept: PHYSICAL THERAPY | Age: 70
Discharge: HOME | End: 2020-11-12
Payer: MEDICARE

## 2020-11-12 PROCEDURE — 97161 PT EVAL LOW COMPLEX 20 MIN: CPT

## 2020-11-12 PROCEDURE — 97140 MANUAL THERAPY 1/> REGIONS: CPT

## 2020-11-12 PROCEDURE — 97110 THERAPEUTIC EXERCISES: CPT

## 2020-11-12 NOTE — THERAPY EVALUATION
Hakeem Weiss  : 1950 [address] [facility] [place] [address].  [phone]   [phone]        OUTPATIENT PHYSICAL THERAPY:Initial Assessment 2020         ICD-10: Treatment Diagnosis: Low back pain (M54.5)  Precautions/Allergies:   Patient has no known allergies. Fall Risk Score:     Ambulatory/Rehab Services H2 Model Falls Risk Assessment    Risk Factors:       (1)  Gender [Male] Ability to Rise from Chair:       (0)  Ability to rise in a single movement    Falls Prevention Plan:       No modifications necessary   Total: (5 or greater = High Risk): 1     [facility]. All Rights Reserved. [facility] Patent #[id number]. Federal Law prohibits the replication, distribution or use without written permission from [address]     MD Orders: evaluate and treat MEDICAL/REFERRING DIAGNOSIS:  DDD (degenerative disc disease), lumbar [M51.36]  Low back pain, unspecified back pain laterality, unspecified chronicity, unspecified whether sciatica present [M54.5]   DATE OF ONSET: approx. 3 week history  REFERRING PHYSICIAN: Fede Bennett 2901: not currently scheduled. INITIAL ASSESSMENT:  Mr. Marielle Augustine presents to therapy with bilateral low back pain that is classified as responding to gentle mobility. He has no radicular symptoms and no changes to strength or sensation. He has increased tone and tightness of the lumbar extensor musculature and increased tightness through the hip flexor and glute musculature. Impairments cause difficulty with upright standing and with bending tasks. Skilled therapy is required to return to prior level of function. PROBLEM LIST (Impacting functional limitations):  1. Decreased Strength  2. Decreased ADL/Functional Activities  3. Increased Pain  4. Decreased Activity Tolerance  5.  Decreased Flexibility/Joint Mobility INTERVENTIONS PLANNED:  1. Family Education  2. Home Exercise Program (HEP)  3. Manual Therapy  4. Neuromuscular Re-education/Strengthening  5. Range of Motion (ROM)  6. Therapeutic Activites  7. Therapeutic Exercise/Strengthening  8. modalities    TREATMENT PLAN:  Effective Dates: 11/12/2020 TO 2/11/2021 (90 days). Frequency/Duration: 2 times a week for 90 Days  GOALS: (Goals have been discussed and agreed upon with patient.)  Short-Term Functional Goals: Time Frame: 2 weeks  1. Patient will be independent with HEP. 2. Patient will have no pain with bending forward to tie his shoes. Discharge Goals: Time Frame: 4 weeks  1. Patient will report no pain with daily activity. 2. Patient will improve pain free hip extension to 15 ° to resolve pain with upright standing. 3. Patient will be able to perform a side plank for 60 seconds to normalize strength and tone of the lateral trunk musculature. Rehabilitation Potential For Stated Goals: Excellent  Regarding Eden Paigeauisaac Chandra's therapy, I certify that the treatment plan above will be carried out by a therapist or under their direction. Thank you for this referral,  Keke Minor DPT       Referring Physician Signature: Paula Gu*              Date                      HISTORY:   History of Present Injury/Illness (Reason for Referral):  Patient presents to therapy with primary complaint of low back pain. This has been ongoing for approximately 2-3 weeks and was initially aggravated with participation in a pilates routine which involved extensive activity focused on segment movement in flexion under load. He describes symptoms as a tightness and stiffness, and is aggravated with any movement that involves forward bending. He also notes aggravation with extending straight to stand upright. He has no pain with walking and gets relief with icing. He has also been taking OTC pain medication intermittently.  He does get some relief with stretching and has performing exercises such as bridges and single knee to chest with some relief. Past Medical History/Comorbidities:   Mr. Hiwot Correa  has a past medical history of Acute sinusitis, Arthritis, Bilateral impacted cerumen, Chronic sinusitis, Conductive hearing loss, Esophageal reflux, Hearing loss, Hearing loss due to cerumen impaction, Itching of ear, and Left knee pain. Mr. Hiwot Correa  has a past surgical history that includes hx lumbar laminectomy; hx tonsillectomy; hx wisdom teeth extraction; and hx colonoscopy. Social History/Living Environment:     patient lives at home with his family. Prior Level of Function/Work/Activity:  Patient is retired and enjoys walking and exercising. Dominant Side:         RIGHT  Current Medications:    Current Outpatient Medications:     mometasone (ELOCON) 0.1 % ointment, Apply  to affected area daily. , Disp: 15 g, Rfl: 3    neomycin-polymyxin-hydrocortisone, buffered, (PEDIOTIC) 3.5-10,000-1 mg/mL-unit/mL-% otic suspension, Administer 3 Drops in right ear three (3) times daily. Indications: otitis, Disp: 10 mL, Rfl: 1    ascorbic acid, vitamin C, (VITAMIN C) 500 mg tablet, Take  by mouth daily. , Disp: , Rfl:     cholecalciferol (VITAMIN D3) 25 mcg (1,000 unit) cap, Take  by mouth daily. , Disp: , Rfl:     cyanocobalamin (VITAMIN B12) 500 mcg tablet, Take  by mouth daily. , Disp: , Rfl:     multivitamin (ONE A DAY) tablet, 1 PO DAILY, Disp: , Rfl:     tadalafiL (CIALIS) 5 mg tablet, Take 5 mg by mouth daily as needed. , Disp: , Rfl:     aspirin delayed-release 81 mg tablet, Take 81 mg by mouth daily. Indications: prevention, Disp: , Rfl:     cetirizine (ZYRTEC) 10 mg tablet, Take 10 mg by mouth every other day., Disp: , Rfl:    Date Last Reviewed:  11/12/2020   # of Personal Factors/Comorbidities that affect the Plan of Care: 0: LOW COMPLEXITY   EXAMINATION:   Observation/Orthostatic Postural Assessment:    Increased tone present through lumbar extensors.   Palpation:    Increased tenderness and tone through bilateral QL and multifi musculature, psoas musculature. ROM:    Toe touch: fingers to ankles  Trunk extension: WNL  Hip rotation: WNL  Hip extension: 10 ° B with provocation on overpressure  Hip flexion: 120 °   SLR: 70 ° B  Strength:   Myotomal strength is 5/5   Special Tests:    NA  Neurological Screen:  Sensation, reflexes, and neural tension are normal.  Functional Mobility:   Independent  Balance: WNL   Body Structures Involved:  1. Nerves  2. Bones  3. Joints  4. Muscles  5. Ligaments Body Functions Affected:  1. Sensory/Pain  2. Neuromusculoskeletal  3. Movement Related Activities and Participation Affected:  1. General Tasks and Demands  2. Mobility  3. Self Care  4. Domestic Life  5. Interpersonal Interactions and Relationships  6. Community, Social and [place]   # of elements that affect the Plan of Care: 1-2: LOW COMPLEXITY   CLINICAL PRESENTATION:   Presentation: Stable and uncomplicated: LOW COMPLEXITY   CLINICAL DECISION MAKING:   Tool Used: Modified Oswestry Low Back Pain Questionnaire  Score:  Initial: will fill out next visit/50  Most Recent: X/50 (Date: -- )   Interpretation of Score: Each section is scored on a 0-5 scale, 5 representing the greatest disability. The scores of each section are added together for a total score of 50. Medical Necessity:   · Patient is expected to demonstrate progress in strength, range of motion, balance and coordination  ·  to increase independence with community mobility and return to prior level of function  · .  Reason for Services/Other Comments:  · Patient has demonstrated an improvement in functional level by independent performance of HEP.    · .   Use of outcome tool(s) and clinical judgement create a POC that gives a: Clear prediction of patient's progress: LOW COMPLEXITY     Total Treatment Duration:  PT Patient Time In/Time Out  Time In: 1400  Time Out: [address] [facility]

## 2020-11-12 NOTE — PROGRESS NOTES
Litzy Blount  : 1950  [place]  Secondary: [employer] [id number] @ [address]  [phone]   [phone]      OUTPATIENT PHYSICAL THERAPY: Daily Treatment Note  2020   Treatment Diagnosis: Low back pain (M54.5)  Effective Dates: 2020 TO 2021 (90 days). Frequency/Duration: 2 times a week for 90 Days  GOALS: (Goals have been discussed and agreed upon with patient.)  Short-Term Functional Goals: Time Frame: 2 weeks  1. Patient will be independent with HEP. 2. Patient will have no pain with bending forward to tie his shoes. Discharge Goals: Time Frame: 4 weeks  1. Patient will report no pain with daily activity. 2. Patient will improve pain free hip extension to 15 ° to resolve pain with upright standing. 3. Patient will be able to perform a side plank for 60 seconds to normalize strength and tone of the lateral trunk musculature. _________________________________________________________________________  Pre-treatment Symptoms/Complaints:  See initial evaluation. Pain: Initial: 8/10 Post Session:  8/10   Medications Last Reviewed:  2020  Updated Objective Findings:  Below measures from initial evaluation unless otherwise noted. Observation/Orthostatic Postural Assessment:    Increased tone present through lumbar extensors. Palpation:    Increased tenderness and tone through bilateral QL and multifi musculature, psoas musculature. ROM:    Toe touch: fingers to ankles  Trunk extension: WNL  Hip rotation: WNL  Hip extension: 10 ° B with provocation on overpressure  Hip flexion: 120 °   SLR: 70 ° B  Strength:   Myotomal strength is 5/5      TREATMENT:   THERAPEUTIC ACTIVITY: ( see below for minutes): Therapeutic activities per grid below to improve mobility, strength, balance and coordination.   Required minimal visual, verbal, manual and tactile cues to improve independence and safety with daily activities . THERAPEUTIC EXERCISE: (see below for minutes):  Exercises per grid below to improve mobility, strength, balance and coordination. Required minimal verbal and manual cues to promote proper body alignment, promote proper body posture and promote proper body mechanics. Progressed resistance, range, repetitions and complexity of movement as indicated. MANUAL THERAPY: (see below for minutes): Joint mobilization and Soft tissue mobilization was utilized and necessary because of the patient's restricted joint motion, painful spasm, loss of articular motion and restricted motion of soft tissue. MODALITIES: (see below for minutes):      to decrease pain    SELF CARE: (see below for minutes): Procedure(s) (per grid) utilized to improve and/or restore self-care/home management as related to dressing, bathing and grooming. Required minimal verbal cueing to facilitate activities of daily living skills and compensatory activities. Date: 11/12/20        Modalities:                                Therapeutic Exercise: 10 min        Discussed HEP Bridging, SKTC, added bird dogs; continue with stretching                                               Proprioceptive Activities:                                Manual Therapy: 15 min        Soft tissue mobilization Focusing on trunk extensors, glutes, psoas               Therapeutic Activities:                                  HEP: see 11/12/20  flow sheet for specifics. [employer] Portal  Treatment/Session Summary:    · Response to Treatment:  Patient is independent with performance of above described home program.  · Communication/Consultation:  None today  · Equipment provided today:  None today  · Recommendations/Intent for next treatment session: Next visit will focus on progression of strength and relief of pain. .    Total Treatment Billable Duration:  45 minutes  PT Patient Time In/Time Out  Time In: 1400  Time Out: [address] [facility] 83, DPT    Future Appointments   Date Time Provider Daisy Guevara   11/16/2020 10:15 AM Raymundo Morrissey, DPT [facility]   11/20/2020  9:30 AM Raymundo Morrissey, DPT [facility]   11/25/2020  2:45 PM Raymundo Morrissey, DPT [facility]   11/30/2020  8:45 AM Raymundo Morrissey, DPT [facility]          '

## 2020-11-16 ENCOUNTER — HOSPITAL ENCOUNTER (OUTPATIENT)
Dept: PHYSICAL THERAPY | Age: 70
Discharge: HOME | End: 2020-11-16
Payer: MEDICARE

## 2020-11-16 PROCEDURE — 97140 MANUAL THERAPY 1/> REGIONS: CPT

## 2020-11-16 NOTE — PROGRESS NOTES
Litzy Blount  : 1950  [place]  Secondary: [employer] [id number] @ [address]  [address] [place] [address].  [phone]   [phone]      OUTPATIENT PHYSICAL THERAPY: Daily Treatment Note  2020   Treatment Diagnosis: Low back pain (M54.5)  Effective Dates: 2020 TO 2021 (90 days). Frequency/Duration: 2 times a week for 90 Days  GOALS: (Goals have been discussed and agreed upon with patient.)  Short-Term Functional Goals: Time Frame: 2 weeks  1. Patient will be independent with HEP. 2. Patient will have no pain with bending forward to tie his shoes. Discharge Goals: Time Frame: 4 weeks  1. Patient will report no pain with daily activity. 2. Patient will improve pain free hip extension to 15 ° to resolve pain with upright standing. 3. Patient will be able to perform a side plank for 60 seconds to normalize strength and tone of the lateral trunk musculature. _________________________________________________________________________  Pre-treatment Symptoms/Complaints:  Reports feeling more comfortable with moving into upright standing. Went on a longer hilly walk Friday and had significant increased pain Saturday which he attributes to increased impact and increased hip rotation during gait. Pain: Initial: 8/10 Post Session:  8/10   Medications Last Reviewed:  2020  Updated Objective Findings:  Below measures from initial evaluation unless otherwise noted. Observation/Orthostatic Postural Assessment:    Increased tone present through lumbar extensors. Palpation:    Increased tenderness and tone through bilateral QL and multifi musculature, psoas musculature.   ROM:    Toe touch: fingers to ankles  Trunk extension: WNL  Hip rotation: WNL  Hip extension: 10 ° B with provocation on overpressure  Hip flexion: 120 °   SLR: 70 ° B  Strength:   Myotomal strength is 5/5 TREATMENT:   THERAPEUTIC ACTIVITY: ( see below for minutes): Therapeutic activities per grid below to improve mobility, strength, balance and coordination. Required minimal visual, verbal, manual and tactile cues to improve independence and safety with daily activities . THERAPEUTIC EXERCISE: (see below for minutes):  Exercises per grid below to improve mobility, strength, balance and coordination. Required minimal verbal and manual cues to promote proper body alignment, promote proper body posture and promote proper body mechanics. Progressed resistance, range, repetitions and complexity of movement as indicated. MANUAL THERAPY: (see below for minutes): Joint mobilization and Soft tissue mobilization was utilized and necessary because of the patient's restricted joint motion, painful spasm, loss of articular motion and restricted motion of soft tissue. MODALITIES: (see below for minutes):      to decrease pain    SELF CARE: (see below for minutes): Procedure(s) (per grid) utilized to improve and/or restore self-care/home management as related to dressing, bathing and grooming. Required minimal verbal cueing to facilitate activities of daily living skills and compensatory activities. Date: 11/12/20 11/16/20 (visit 2)      Modalities:                                Therapeutic Exercise: 10 min  5 min       Discussed HEP Bridging, SKTC, added bird dogs; continue with stretching reviewed                                              Proprioceptive Activities:                                Manual Therapy: 15 min  40 min       Soft tissue mobilization Focusing on trunk extensors, glutes, psoas Continued STM to lumbar paraspinals, QL, psoas, and glutes to decrease tone              Therapeutic Activities:                                  HEP: see 11/12/20  flow sheet for specifics. [employer] Portal  Treatment/Session Summary:    · Response to Treatment: Tone is decreased through trunk extensors.  Continues to have specific palpable tenderness through lateral glute and psoas musculature. · Communication/Consultation:  None today  · Equipment provided today:  None today  · Recommendations/Intent for next treatment session: Next visit will focus on progression of strength and relief of pain. .    Total Treatment Billable Duration:  45 minutes  PT Patient Time In/Time Out  Time In: 1015  Time Out: [address], DPT    Future Appointments   Date Time Provider Daisy Guevara   11/20/2020  9:30 AM Gretel Vazquez, FIORDALIZA [facility]   11/25/2020  2:45 PM Gretel Vazquez, DPT [facility]   11/30/2020  8:45 AM Gretel Vazquez, DPT [facility]   12/2/2020 10:15 AM Gretel Vazquez, SULEIMANT [facility]   12/8/2020 11:00 AM Gretel Vazquez, DPT [facility]   12/10/2020 11:00 AM Gretel Vazquez, DPT [facility]   12/15/2020 11:00 AM [place], DPT [facility]   12/17/2020 11:45 AM Gretel Vazquez, SULEIMANT [facility]   12/22/2020 11:00 AM Gretel Vazquez, DPT [facility]   12/29/2020 11:00 AM Gretel Vazquez, DPT [facility]          '

## 2020-11-17 ENCOUNTER — APPOINTMENT (OUTPATIENT)
Dept: PHYSICAL THERAPY | Age: 70
End: 2020-11-17
Payer: MEDICARE

## 2020-11-20 ENCOUNTER — HOSPITAL ENCOUNTER (OUTPATIENT)
Dept: PHYSICAL THERAPY | Age: 70
Discharge: HOME | End: 2020-11-20
Payer: MEDICARE

## 2020-11-20 PROCEDURE — 97140 MANUAL THERAPY 1/> REGIONS: CPT

## 2020-11-20 NOTE — PROGRESS NOTES
Gaston Boykin  : 1950  [place]  Secondary: [facility] [address] [facility] @ [address], [address] [place] [address]  [phone]   [phone]      OUTPATIENT PHYSICAL THERAPY: Daily Treatment Note  2020   Treatment Diagnosis: Low back pain (M54.5)  Effective Dates: 2020 TO 2021 (90 days). Frequency/Duration: 2 times a week for 90 Days  GOALS: (Goals have been discussed and agreed upon with patient.)  Short-Term Functional Goals: Time Frame: 2 weeks  1. Patient will be independent with HEP. 2. Patient will have no pain with bending forward to tie his shoes. Discharge Goals: Time Frame: 4 weeks  1. Patient will report no pain with daily activity. 2. Patient will improve pain free hip extension to 15 ° to resolve pain with upright standing. 3. Patient will be able to perform a side plank for 60 seconds to normalize strength and tone of the lateral trunk musculature. _________________________________________________________________________  Pre-treatment Symptoms/Complaints:  Notes walking has been feeling a bit better but had his back \"lock up\" on him following a longer walk on Wednesday. This resolved with ice, but he continues to note difficulty with standing fully upright. Pain: Initial: 8/10 Post Session:  8/10   Medications Last Reviewed:  2020  Updated Objective Findings:  Below measures from initial evaluation unless otherwise noted. Observation/Orthostatic Postural Assessment:    Increased tone present through lumbar extensors. Palpation:    Increased tenderness and tone through bilateral QL and multifi musculature, psoas musculature.   ROM:    Toe touch: fingers to ankles  Trunk extension: WNL  Hip rotation: WNL  Hip extension: 10 ° B with provocation on overpressure  Hip flexion: 120 °   SLR: 70 ° B  Strength:   Myotomal strength is 5/5      TREATMENT: THERAPEUTIC ACTIVITY: ( see below for minutes): Therapeutic activities per grid below to improve mobility, strength, balance and coordination. Required minimal visual, verbal, manual and tactile cues to improve independence and safety with daily activities . THERAPEUTIC EXERCISE: (see below for minutes):  Exercises per grid below to improve mobility, strength, balance and coordination. Required minimal verbal and manual cues to promote proper body alignment, promote proper body posture and promote proper body mechanics. Progressed resistance, range, repetitions and complexity of movement as indicated. MANUAL THERAPY: (see below for minutes): Joint mobilization and Soft tissue mobilization was utilized and necessary because of the patient's restricted joint motion, painful spasm, loss of articular motion and restricted motion of soft tissue. MODALITIES: (see below for minutes):      to decrease pain    SELF CARE: (see below for minutes): Procedure(s) (per grid) utilized to improve and/or restore self-care/home management as related to dressing, bathing and grooming. Required minimal verbal cueing to facilitate activities of daily living skills and compensatory activities. Date: 11/12/20 11/16/20 (visit 2) 11/20/20 (visit 3)     Modalities:                                Therapeutic Exercise: 10 min  5 min       Discussed HEP Bridging, SKTC, added bird dogs; continue with stretching reviewed                                              Proprioceptive Activities:                                Manual Therapy: 15 min  40 min  45 min      Soft tissue mobilization Focusing on trunk extensors, glutes, psoas Continued STM to lumbar paraspinals, QL, psoas, and glutes to decrease tone Repeat, with primary focus on R QL and lateral glute musuclature             Therapeutic Activities:                                  HEP: see 11/12/20  flow sheet for specifics.     [employer] Portal  Treatment/Session Summary: · Response to Treatment:  Continues to have increased tone through the extensors. This is more prominent on the right. · Communication/Consultation:  None today  · Equipment provided today:  None today  · Recommendations/Intent for next treatment session: Next visit will focus on progression of strength and relief of pain. .    Total Treatment Billable Duration:  45 minutes  PT Patient Time In/Time Out  Time In: 0930  Time Out: [address] DPT    Future Appointments   Date Time Provider Daisy Guevara   11/25/2020  2:45 PM Erin Sibley DPT [facility]   11/30/2020  8:45 AM Erin Sibley DPT [facility]   12/2/2020 10:15 AM Erin Sibley DPT [facility]   12/8/2020 11:00 AM Erin Sibley DPT [facility]   12/10/2020 11:00 AM Erin Sibley, FIORDALIZA [facility]   12/15/2020 11:00 AM Erin Sibley DPT [facility]   12/17/2020 11:45 AM Erin Sibley DPT [facility]   12/22/2020 11:00 AM Erin Sibley DPT [facility]   12/29/2020 11:00 AM Erin Sibley DPT [facility]          '

## 2020-11-25 ENCOUNTER — HOSPITAL ENCOUNTER (OUTPATIENT)
Dept: PHYSICAL THERAPY | Age: 70
Discharge: HOME | End: 2020-11-25
Payer: MEDICARE

## 2020-11-25 PROCEDURE — 97140 MANUAL THERAPY 1/> REGIONS: CPT

## 2020-11-30 ENCOUNTER — HOSPITAL ENCOUNTER (OUTPATIENT)
Dept: PHYSICAL THERAPY | Age: 70
Discharge: HOME | End: 2020-11-30
Payer: MEDICARE

## 2020-11-30 PROCEDURE — 97140 MANUAL THERAPY 1/> REGIONS: CPT

## 2020-11-30 NOTE — PROGRESS NOTES
Saumya Olson  : 1950  [place]  Secondary: [employer] [address] @ [address] [place].  [phone]   [phone]      OUTPATIENT PHYSICAL THERAPY: Daily Treatment Note  2020   Treatment Diagnosis: Low back pain (M54.5)  Effective Dates: 2020 TO 2021 (90 days). Frequency/Duration: 2 times a week for 90 Days  GOALS: (Goals have been discussed and agreed upon with patient.)  Short-Term Functional Goals: Time Frame: 2 weeks  1. Patient will be independent with HEP. 2. Patient will have no pain with bending forward to tie his shoes. Discharge Goals: Time Frame: 4 weeks  1. Patient will report no pain with daily activity. 2. Patient will improve pain free hip extension to 15 ° to resolve pain with upright standing. 3. Patient will be able to perform a side plank for 60 seconds to normalize strength and tone of the lateral trunk musculature. _________________________________________________________________________  Pre-treatment Symptoms/Complaints: reports he has been doing better. Able to walk increasing distances and has not experienced any locking episodes. Pain: Initial: mild-moderate/10 Post Session:  No increase/10   Medications Last Reviewed:  2020  Updated Objective Findings:  Below measures from initial evaluation unless otherwise noted. Observation/Orthostatic Postural Assessment:    Increased tone present through lumbar extensors. Palpation:    Increased tenderness and tone through bilateral QL and multifi musculature, psoas musculature. ROM:    Toe touch: fingers to ankles  Trunk extension: WNL  Hip rotation: WNL  Hip extension: 10 ° B with provocation on overpressure  Hip flexion: 120 °   SLR: 70 ° B  Strength:   Myotomal strength is 5/5      TREATMENT:   THERAPEUTIC ACTIVITY: ( see below for minutes):   Therapeutic activities per grid below to improve mobility, strength, balance and coordination. Required minimal visual, verbal, manual and tactile cues to improve independence and safety with daily activities . THERAPEUTIC EXERCISE: (see below for minutes):  Exercises per grid below to improve mobility, strength, balance and coordination. Required minimal verbal and manual cues to promote proper body alignment, promote proper body posture and promote proper body mechanics. Progressed resistance, range, repetitions and complexity of movement as indicated. MANUAL THERAPY: (see below for minutes): Joint mobilization and Soft tissue mobilization was utilized and necessary because of the patient's restricted joint motion, painful spasm, loss of articular motion and restricted motion of soft tissue. MODALITIES: (see below for minutes):      to decrease pain    SELF CARE: (see below for minutes): Procedure(s) (per grid) utilized to improve and/or restore self-care/home management as related to dressing, bathing and grooming. Required minimal verbal cueing to facilitate activities of daily living skills and compensatory activities.      Date: 11/12/20 11/16/20 (visit 2) 11/20/20 (visit 3) 11/25/20 (visit 4) 11/30/20 (visit 5)   Modalities:                                Therapeutic Exercise: 10 min  5 min       Discussed HEP Bridging, SKTC, added bird dogs; continue with stretching reviewed                                              Proprioceptive Activities:                                Manual Therapy: 15 min  40 min  45 min  45 min  45 min   Soft tissue mobilization Focusing on trunk extensors, glutes, psoas Continued STM to lumbar paraspinals, QL, psoas, and glutes to decrease tone Repeat, with primary focus on R QL and lateral glute musuclature Repeat, with continued focus on R QL STM with deep trigger point release to B QL and paraspinals into the lower thoracic spine           Therapeutic Activities: HEP: see 11/12/20  flow sheet for specifics. [employer] Portal  Treatment/Session Summary:    · Response to Treatment: Tone is continuing to improve. Tenderness remains deep to the QL and through psoas musculature. · Communication/Consultation:  None today  · Equipment provided today:  None today  · Recommendations/Intent for next treatment session: Next visit will focus on progression of strength and relief of pain. .    Total Treatment Billable Duration:  45 minutes  PT Patient Time In/Time Out  Time In: 0845  Time Out: FIORDALIZA [place]    Future Appointments   Date Time Provider Daisy Guevara   12/2/2020 10:15 AM [facility]   12/8/2020 11:00 AM Ram[facility] Willams, [facility]   12/10/2020 11:00 AM Ramona Diss, FIORDALIZA [facility]   12/15/2020 11:00 AM Ramona Diss, DPT [facility]   12/17/2020 11:45 AM Ramona Diss, DPT [facility]   12/22/2020 11:00 AM Ramona Diss, DPT [facility]   12/29/2020 11:00 AM Ramona Diss, DPT [facility]          '

## 2020-12-02 ENCOUNTER — HOSPITAL ENCOUNTER (OUTPATIENT)
Dept: PHYSICAL THERAPY | Age: 70
Discharge: HOME | End: 2020-12-02
Payer: MEDICARE

## 2020-12-02 PROCEDURE — 97140 MANUAL THERAPY 1/> REGIONS: CPT

## 2020-12-02 NOTE — PROGRESS NOTES
Nat Diane  : 1950  [place]  Secondary: [facility] [employer] [address] [facility] @ [address] [place] [address].  [phone]   [phone]      OUTPATIENT PHYSICAL THERAPY: Daily Treatment Note  2020   Treatment Diagnosis: Low back pain (M54.5)  Effective Dates: 2020 TO 2021 (90 days). Frequency/Duration: 2 times a week for 90 Days  GOALS: (Goals have been discussed and agreed upon with patient.)  Short-Term Functional Goals: Time Frame: 2 weeks  1. Patient will be independent with HEP. 2. Patient will have no pain with bending forward to tie his shoes. Discharge Goals: Time Frame: 4 weeks  1. Patient will report no pain with daily activity. 2. Patient will improve pain free hip extension to 15 ° to resolve pain with upright standing. 3. Patient will be able to perform a side plank for 60 seconds to normalize strength and tone of the lateral trunk musculature. _________________________________________________________________________  Pre-treatment Symptoms/Complaints: feeling much more comfortable and confident with bending forward. Pain: Initial: mild-moderate/10 Post Session:  No increase/10   Medications Last Reviewed:  2020  Updated Objective Findings:  Below measures from initial evaluation unless otherwise noted. Observation/Orthostatic Postural Assessment:    Increased tone present through lumbar extensors. Palpation:    Increased tenderness and tone through bilateral QL and multifi musculature, psoas musculature. ROM:    Toe touch: fingers to ankles  Trunk extension: WNL  Hip rotation: WNL  Hip extension: 10 ° B with provocation on overpressure  Hip flexion: 120 °   SLR: 70 ° B  Strength:   Myotomal strength is 5/5      TREATMENT:   THERAPEUTIC ACTIVITY: ( see below for minutes):   Therapeutic activities per grid below to improve mobility, strength, balance and coordination. Required minimal visual, verbal, manual and tactile cues to improve independence and safety with daily activities . THERAPEUTIC EXERCISE: (see below for minutes):  Exercises per grid below to improve mobility, strength, balance and coordination. Required minimal verbal and manual cues to promote proper body alignment, promote proper body posture and promote proper body mechanics. Progressed resistance, range, repetitions and complexity of movement as indicated. MANUAL THERAPY: (see below for minutes): Joint mobilization and Soft tissue mobilization was utilized and necessary because of the patient's restricted joint motion, painful spasm, loss of articular motion and restricted motion of soft tissue. MODALITIES: (see below for minutes):      to decrease pain    SELF CARE: (see below for minutes): Procedure(s) (per grid) utilized to improve and/or restore self-care/home management as related to dressing, bathing and grooming. Required minimal verbal cueing to facilitate activities of daily living skills and compensatory activities.      Date: 11/12/20 11/16/20 (visit 2) 11/20/20 (visit 3) 11/25/20 (visit 4) 11/30/20 (visit 5) 12/2/20 (visit 6)   Modalities:                                    Therapeutic Exercise: 10 min  5 min        Discussed HEP Bridging, SKTC, added bird dogs; continue with stretching reviewed                                                    Proprioceptive Activities:                                    Manual Therapy: 15 min  40 min  45 min  45 min  45 min 45 min    Soft tissue mobilization Focusing on trunk extensors, glutes, psoas Continued STM to lumbar paraspinals, QL, psoas, and glutes to decrease tone Repeat, with primary focus on R QL and lateral glute musuclature Repeat, with continued focus on R QL STM with deep trigger point release to B QL and paraspinals into the lower thoracic spine Repeat; with increased deep trigger to thoracic paraspinals Therapeutic Activities:                                      HEP: see 11/12/20  flow sheet for specifics. [employer] Portal  Treatment/Session Summary:    · Response to Treatment:  Having decreased tenderness to QL and glute musculature. Some residual increased tone is present through the thoracic paraspinals. · Communication/Consultation:  None today  · Equipment provided today:  None today  · Recommendations/Intent for next treatment session: Next visit will focus on progression of strength and relief of pain. .    Total Treatment Billable Duration:  45 minutes  PT Patient Time In/Time Out  Time In: 1015  Time Out: [address], DPT    Future Appointments   Date Time Provider Daisy Guevara   12/8/2020 11:00 AM Corina Ricks DPT [facility]   12/10/2020 11:00 AM Corina Ricks DPT [facility]   12/15/2020 11:00 AM Corina Ricks DPT [facility]   12/17/2020 11:45 AM Corina Ricks DPT [facility]   12/22/2020 11:00 AM Corina Ricks DPT [facility]   12/29/2020 11:00 AM Corina Ricks DPT [facility]          '

## 2020-12-08 ENCOUNTER — HOSPITAL ENCOUNTER (OUTPATIENT)
Dept: PHYSICAL THERAPY | Age: 70
Discharge: HOME | End: 2020-12-08
Payer: MEDICARE

## 2020-12-08 PROCEDURE — 97140 MANUAL THERAPY 1/> REGIONS: CPT

## 2020-12-08 NOTE — PROGRESS NOTES
Chichi Juan  : 1950  [place]  Secondary: [facility] [employer] [facility] [address] [place] @ [address]  [phone]   [phone]      OUTPATIENT PHYSICAL THERAPY: Daily Treatment Note  2020   Treatment Diagnosis: Low back pain (M54.5)  Effective Dates: 2020 TO 2021 (90 days). Frequency/Duration: 2 times a week for 90 Days  GOALS: (Goals have been discussed and agreed upon with patient.)  Short-Term Functional Goals: Time Frame: 2 weeks  1. Patient will be independent with HEP. 2. Patient will have no pain with bending forward to tie his shoes. Discharge Goals: Time Frame: 4 weeks  1. Patient will report no pain with daily activity. 2. Patient will improve pain free hip extension to 15 ° to resolve pain with upright standing. 3. Patient will be able to perform a side plank for 60 seconds to normalize strength and tone of the lateral trunk musculature. _________________________________________________________________________  Pre-treatment Symptoms/Complaints: continuing to feel better with walking and bending. Pain: Initial: mild/10 Post Session:  No increase/10   Medications Last Reviewed:  2020  Updated Objective Findings:  Below measures from initial evaluation unless otherwise noted. Observation/Orthostatic Postural Assessment:    Increased tone present through lumbar extensors. Palpation:    Increased tenderness and tone through bilateral QL and multifi musculature, psoas musculature. ROM:    Toe touch: fingers to ankles  Trunk extension: WNL  Hip rotation: WNL  Hip extension: 10 ° B with provocation on overpressure  Hip flexion: 120 °   SLR: 70 ° B  Strength:   Myotomal strength is 5/5      TREATMENT:   THERAPEUTIC ACTIVITY: ( see below for minutes): Therapeutic activities per grid below to improve mobility, strength, balance and coordination.   Required minimal visual, verbal, manual and tactile cues to improve independence and safety with daily activities . THERAPEUTIC EXERCISE: (see below for minutes):  Exercises per grid below to improve mobility, strength, balance and coordination. Required minimal verbal and manual cues to promote proper body alignment, promote proper body posture and promote proper body mechanics. Progressed resistance, range, repetitions and complexity of movement as indicated. MANUAL THERAPY: (see below for minutes): Joint mobilization and Soft tissue mobilization was utilized and necessary because of the patient's restricted joint motion, painful spasm, loss of articular motion and restricted motion of soft tissue. MODALITIES: (see below for minutes):      to decrease pain    SELF CARE: (see below for minutes): Procedure(s) (per grid) utilized to improve and/or restore self-care/home management as related to dressing, bathing and grooming. Required minimal verbal cueing to facilitate activities of daily living skills and compensatory activities.      Date: 11/12/20 11/16/20 (visit 2) 11/20/20 (visit 3) 11/25/20 (visit 4) 11/30/20 (visit 5) 12/2/20 (visit 6) 12/9/20 (visit 7)   Modalities:                                        Therapeutic Exercise: 10 min  5 min         Discussed HEP Bridging, SKTC, added bird dogs; continue with stretching reviewed                                                          Proprioceptive Activities:                                        Manual Therapy: 15 min  40 min  45 min  45 min  45 min 45 min  45 min    Soft tissue mobilization Focusing on trunk extensors, glutes, psoas Continued STM to lumbar paraspinals, QL, psoas, and glutes to decrease tone Repeat, with primary focus on R QL and lateral glute musuclature Repeat, with continued focus on R QL STM with deep trigger point release to B QL and paraspinals into the lower thoracic spine Repeat; with increased deep trigger to thoracic paraspinals repeat             Therapeutic Activities:                                          HEP: see 11/12/20  flow sheet for specifics. [employer] Portal  Treatment/Session Summary:    · Response to Treatment:  Primary localized tenderness remains through lower thoracic spine. Continues to have some tenderness through QL but this has diminished. · Communication/Consultation:  None today  · Equipment provided today:  None today  · Recommendations/Intent for next treatment session: Next visit will focus on progression of strength and relief of pain. .    Total Treatment Billable Duration:  45 minutes  PT Patient Time In/Time Out  Time In: 1100  Time Out: Carlos 36, DPT    Future Appointments   Date Time Provider Daisy Guevara   12/10/2020  7:00 PM Maritza Mcnamara DPT [facility]   12/15/2020 11:00 AM Maritza Mcnamara DPT [facility]   12/17/2020 11:45 AM Maritza Mcnamara DPT [facility]   12/22/2020 11:00 AM Maritza Mcnamara DPT [facility]   12/29/2020 11:00 AM Maritza Mcnamara DPT [facility]          '

## 2020-12-10 ENCOUNTER — APPOINTMENT (OUTPATIENT)
Dept: PHYSICAL THERAPY | Age: 70
End: 2020-12-10
Payer: MEDICARE

## 2020-12-15 ENCOUNTER — HOSPITAL ENCOUNTER (OUTPATIENT)
Dept: PHYSICAL THERAPY | Age: 70
Discharge: HOME | End: 2020-12-15
Payer: MEDICARE

## 2020-12-15 PROCEDURE — 97140 MANUAL THERAPY 1/> REGIONS: CPT

## 2020-12-15 NOTE — PROGRESS NOTES
Arthur Levine  : 1950  [place]  Secondary: [facility] [employer] Supplement Medicare [address]  [phone]   [phone]      OUTPATIENT PHYSICAL THERAPY: Daily Treatment Note  12/15/2020   Treatment Diagnosis: Low back pain (M54.5)  Effective Dates: 2020 TO 2021 (90 days). Frequency/Duration: 2 times a week for 90 Days  GOALS: (Goals have been discussed and agreed upon with patient.)  Short-Term Functional Goals: Time Frame: 2 weeks  1. Patient will be independent with HEP. 2. Patient will have no pain with bending forward to tie his shoes. Discharge Goals: Time Frame: 4 weeks  1. Patient will report no pain with daily activity. 2. Patient will improve pain free hip extension to 15 ° to resolve pain with upright standing. 3. Patient will be able to perform a side plank for 60 seconds to normalize strength and tone of the lateral trunk musculature. _________________________________________________________________________  Pre-treatment Symptoms/Complaints: continuing to report some soreness across the iliac crests. No longer having episodes of locking up. Had a long car trip over the weekend with only mild increase in soreness. Pain: Initial: mild/10 Post Session:  No increase/10   Medications Last Reviewed:  12/15/2020  Updated Objective Findings:  Below measures from initial evaluation unless otherwise noted. Observation/Orthostatic Postural Assessment:    Increased tone present through lumbar extensors. Palpation:    Increased tenderness and tone through bilateral QL and multifi musculature, psoas musculature.   ROM:    Toe touch: fingers to ankles  Trunk extension: WNL  Hip rotation: WNL  Hip extension: 10 ° B with provocation on overpressure  Hip flexion: 120 °   SLR: 70 ° B  Strength:   Myotomal strength is 5/5      TREATMENT:   THERAPEUTIC ACTIVITY: ( see below for minutes): Therapeutic activities per grid below to improve mobility, strength, balance and coordination. Required minimal visual, verbal, manual and tactile cues to improve independence and safety with daily activities . THERAPEUTIC EXERCISE: (see below for minutes):  Exercises per grid below to improve mobility, strength, balance and coordination. Required minimal verbal and manual cues to promote proper body alignment, promote proper body posture and promote proper body mechanics. Progressed resistance, range, repetitions and complexity of movement as indicated. MANUAL THERAPY: (see below for minutes): Joint mobilization and Soft tissue mobilization was utilized and necessary because of the patient's restricted joint motion, painful spasm, loss of articular motion and restricted motion of soft tissue. MODALITIES: (see below for minutes):      to decrease pain    SELF CARE: (see below for minutes): Procedure(s) (per grid) utilized to improve and/or restore self-care/home management as related to dressing, bathing and grooming. Required minimal verbal cueing to facilitate activities of daily living skills and compensatory activities.      Date: 11/12/20 11/16/20 (visit 2) 11/20/20 (visit 3) 11/25/20 (visit 4) 11/30/20 (visit 5) 12/2/20 (visit 6) 12/9/20 (visit 7) 12/15/20 (visit 8)   Modalities:                                            Therapeutic Exercise: 10 min  5 min          Discussed HEP Bridging, SKTC, added bird dogs; continue with stretching reviewed                                                                Proprioceptive Activities:                                            Manual Therapy: 15 min  40 min  45 min  45 min  45 min 45 min  45 min  45 min   Soft tissue mobilization Focusing on trunk extensors, glutes, psoas Continued STM to lumbar paraspinals, QL, psoas, and glutes to decrease tone Repeat, with primary focus on R QL and lateral glute musuclature Repeat, with continued focus on R QL STM with deep trigger point release to B QL and paraspinals into the lower thoracic spine Repeat; with increased deep trigger to thoracic paraspinals repeat STM with trigger point release to QL, paraspinals, hip flexors              Therapeutic Activities:                                              HEP: see 11/12/20  flow sheet for specifics. [employer] Portal  Treatment/Session Summary:    · Response to Treatment:  Continues to have some irritable trigger points through paraspinals. General tone has improved through QL musculature. · Communication/Consultation:  None today  · Equipment provided today:  None today  · Recommendations/Intent for next treatment session: Next visit will focus on progression of strength and relief of pain. .    Total Treatment Billable Duration:  45 minutes  PT Patient Time In/Time Out  Time In: 1100  Time Out: Carlos Dupont DPT    Future Appointments   Date Time Provider Daisy Guevara   12/17/2020 11:45 AM Nina Lyles DPT [facility]   12/22/2020 11:00 AM Nina Lyles DPT [facility]   12/29/2020 11:00 AM Nina Lyles DPT [facility]          '

## 2020-12-17 ENCOUNTER — HOSPITAL ENCOUNTER (OUTPATIENT)
Dept: PHYSICAL THERAPY | Age: 70
Discharge: HOME | End: 2020-12-17
Payer: MEDICARE

## 2020-12-17 PROCEDURE — 97140 MANUAL THERAPY 1/> REGIONS: CPT

## 2020-12-17 NOTE — PROGRESS NOTES
Vianey Nunez  : 1950  [place]  Secondary: [employer] Supp[employer] [address]  [phone]   [phone]      OUTPATIENT PHYSICAL THERAPY: Daily Treatment Note  2020   Treatment Diagnosis: Low back pain (M54.5)  Effective Dates: 2020 TO 2021 (90 days). Frequency/Duration: 2 times a week for 90 Days  GOALS: (Goals have been discussed and agreed upon with patient.)  Short-Term Functional Goals: Time Frame: 2 weeks  1. Patient will be independent with HEP. 2. Patient will have no pain with bending forward to tie his shoes. Discharge Goals: Time Frame: 4 weeks  1. Patient will report no pain with daily activity. 2. Patient will improve pain free hip extension to 15 ° to resolve pain with upright standing. 3. Patient will be able to perform a side plank for 60 seconds to normalize strength and tone of the lateral trunk musculature. _________________________________________________________________________  Pre-treatment Symptoms/Complaints: reports soreness is continuing to improve. Primarily notices tightness through the upper back but continues to have mild soreness to the low back. Pain: Initial: mild/10 Post Session:  No increase/10   Medications Last Reviewed:  2020  Updated Objective Findings:  Below measures from initial evaluation unless otherwise noted. Observation/Orthostatic Postural Assessment:    Increased tone present through lumbar extensors. Palpation:    Increased tenderness and tone through bilateral QL and multifi musculature, psoas musculature.   ROM:    Toe touch: fingers to ankles  Trunk extension: WNL  Hip rotation: WNL  Hip extension: 10 ° B with provocation on overpressure  Hip flexion: 120 °   SLR: 70 ° B  Strength:   Myotomal strength is 5/5      TREATMENT:   THERAPEUTIC ACTIVITY: ( see below for minutes): Therapeutic activities per grid below to improve mobility, strength, balance and coordination. Required minimal visual, verbal, manual and tactile cues to improve independence and safety with daily activities . THERAPEUTIC EXERCISE: (see below for minutes):  Exercises per grid below to improve mobility, strength, balance and coordination. Required minimal verbal and manual cues to promote proper body alignment, promote proper body posture and promote proper body mechanics. Progressed resistance, range, repetitions and complexity of movement as indicated. MANUAL THERAPY: (see below for minutes): Joint mobilization and Soft tissue mobilization was utilized and necessary because of the patient's restricted joint motion, painful spasm, loss of articular motion and restricted motion of soft tissue. MODALITIES: (see below for minutes):      to decrease pain    SELF CARE: (see below for minutes): Procedure(s) (per grid) utilized to improve and/or restore self-care/home management as related to dressing, bathing and grooming. Required minimal verbal cueing to facilitate activities of daily living skills and compensatory activities.      Date: 11/12/20 11/16/20 (visit 2) 11/20/20 (visit 3) 11/25/20 (visit 4) 11/30/20 (visit 5) 12/2/20 (visit 6) 12/9/20 (visit 7) 12/15/20 (visit 8) 12/17/20 (visit 9)   Modalities:                                                Therapeutic Exercise: 10 min  5 min           Discussed HEP Bridging, SKTC, added bird dogs; continue with stretching reviewed                                                                   Reviewed SL thoracic rotation for upper back mobility   Proprioceptive Activities:                                                Manual Therapy: 15 min  40 min  45 min  45 min  45 min 45 min  45 min  45 min 45 min    Soft tissue mobilization Focusing on trunk extensors, glutes, psoas Continued STM to lumbar paraspinals, QL, psoas, and glutes to decrease tone Repeat, with primary focus on R QL and lateral glute musuclature Repeat, with continued focus on R QL STM with deep trigger point release to B QL and paraspinals into the lower thoracic spine Repeat; with increased deep trigger to thoracic paraspinals repeat STM with trigger point release to QL, paraspinals, hip flexors repeat               Therapeutic Activities:                                                  HEP: see 11/12/20  flow sheet for specifics. [employer] Portal  Treatment/Session Summary:    · Response to Treatment:  Tenderness has significantly improved to the QL. Remaining tightness and tender points to thoracic paraspinals. Added thoracic rotation to HEP to improve mobility and decrease tenderness. · Communication/Consultation:  None today  · Equipment provided today:  None today  · Recommendations/Intent for next treatment session: Next visit will focus on progression of strength and relief of pain. .    Total Treatment Billable Duration:  45 minutes  PT Patient Time In/Time Out  Time In: 8857  Time Out: [address], DPT    Future Appointments   Date Time Provider Daisy Guevara   12/22/2020 11:00 AM Med Wong DPT [facility]   12/29/2020 11:00 AM Med Wong DPT [facility]          '

## 2020-12-22 ENCOUNTER — HOSPITAL ENCOUNTER (OUTPATIENT)
Dept: PHYSICAL THERAPY | Age: 70
Discharge: HOME | End: 2020-12-22
Payer: MEDICARE

## 2020-12-22 PROCEDURE — 97110 THERAPEUTIC EXERCISES: CPT

## 2020-12-22 NOTE — PROGRESS NOTES
Early Starch  : 1950  [place]  [facility] [employer] [facility] [address]  [phone]   [phone]      OUTPATIENT PHYSICAL THERAPY: Daily Treatment Note  2020   Treatment Diagnosis: Low back pain (M54.5)  Effective Dates: 2020 TO 2021 (90 days). Frequency/Duration: 2 times a week for 90 Days  GOALS: (Goals have been discussed and agreed upon with patient.)  Short-Term Functional Goals: Time Frame: 2 weeks MET  1. Patient will be independent with HEP. 2. Patient will have no pain with bending forward to tie his shoes. Discharge Goals: Time Frame: 4 weeks ONGOING  1. Patient will report no pain with daily activity. 2. Patient will improve pain free hip extension to 15 ° to resolve pain with upright standing. 3. Patient will be able to perform a side plank for 60 seconds to normalize strength and tone of the lateral trunk musculature. _________________________________________________________________________  Pre-treatment Symptoms/Complaints: Continuing to feel better through the back. Has progressed walking on hills with no provocation. Pain: Initial: mild/10 Post Session:  No increase/10   Medications Last Reviewed:  2020  Updated Objective Findings:  Below measures from initial evaluation unless otherwise noted. Observation/Orthostatic Postural Assessment:    Increased tone present through lumbar extensors. Palpation:    Increased tenderness and tone through bilateral QL and multifi musculature, psoas musculature. ROM:    Toe touch: fingers to ankles  Trunk extension: WNL  Hip rotation: WNL  Hip extension: 10 ° B with provocation on overpressure  Hip flexion: 120 °   SLR: 70 ° B  Strength:   Myotomal strength is 5/5     20 update;   Mild tenderness present to QL and paraspinals but significantly diminished. TREATMENT:   THERAPEUTIC ACTIVITY: ( see below for minutes): Therapeutic activities per grid below to improve mobility, strength, balance and coordination. Required minimal visual, verbal, manual and tactile cues to improve independence and safety with daily activities . THERAPEUTIC EXERCISE: (see below for minutes):  Exercises per grid below to improve mobility, strength, balance and coordination. Required minimal verbal and manual cues to promote proper body alignment, promote proper body posture and promote proper body mechanics. Progressed resistance, range, repetitions and complexity of movement as indicated. MANUAL THERAPY: (see below for minutes): Joint mobilization and Soft tissue mobilization was utilized and necessary because of the patient's restricted joint motion, painful spasm, loss of articular motion and restricted motion of soft tissue. MODALITIES: (see below for minutes):      to decrease pain    SELF CARE: (see below for minutes): Procedure(s) (per grid) utilized to improve and/or restore self-care/home management as related to dressing, bathing and grooming. Required minimal verbal cueing to facilitate activities of daily living skills and compensatory activities.      Date: 11/30/20 (visit 5) 12/2/20 (visit 6) 12/9/20 (visit 7) 12/15/20 (visit 8) 12/17/20 (visit 9) 12/22/20 (visit 10)   Modalities:                                    Therapeutic Exercise:         Discussed HEP                                                  Reviewed SL thoracic rotation for upper back mobility Discussed added goblet squat to HEP   Proprioceptive Activities:                                    Manual Therapy: 45 min 45 min  45 min  45 min 45 min  45 min    Soft tissue mobilization STM with deep trigger point release to B QL and paraspinals into the lower thoracic spine Repeat; with increased deep trigger to thoracic paraspinals repeat STM with trigger point release to QL, paraspinals, hip flexors repeat repeat            Therapeutic Activities:                                      HEP: see 11/12/20  flow sheet for specifics. [employer] Portal  Treatment/Session Summary:    · Response to Treatment:  Ready to progress load with home program to include resistance with squatting movement. · Communication/Consultation:  None today  · Equipment provided today:  None today  · Recommendations/Intent for next treatment session: Next visit will focus on progression of strength and relief of pain. .    Total Treatment Billable Duration:  45 minutes  PT Patient Time In/Time Out  Time In: 1100  Time Out: SULEIMAN QuirozT    Future Appointments   Date Time Provider Daisy Guevara   12/29/2020 11:00 AM Belem Casillas DPT [facility]          '

## 2020-12-29 ENCOUNTER — HOSPITAL ENCOUNTER (OUTPATIENT)
Dept: PHYSICAL THERAPY | Age: 70
Discharge: HOME | End: 2020-12-29
Payer: MEDICARE

## 2020-12-29 PROCEDURE — 97140 MANUAL THERAPY 1/> REGIONS: CPT

## 2020-12-29 NOTE — PROGRESS NOTES
Miguel Eisenberg  : 1950  [place]  Secondary: [facility] [employer] Adia Trent @ [address], [place] [address]  [phone]   [phone]      OUTPATIENT PHYSICAL THERAPY: Daily Treatment Note  2020   Treatment Diagnosis: Low back pain (M54.5)  Effective Dates: 2020 TO 2021 (90 days). Frequency/Duration: 2 times a week for 90 Days  GOALS: (Goals have been discussed and agreed upon with patient.)  Short-Term Functional Goals: Time Frame: 2 weeks MET  1. Patient will be independent with HEP. 2. Patient will have no pain with bending forward to tie his shoes. Discharge Goals: Time Frame: 4 weeks ONGOING  1. Patient will report no pain with daily activity. 2. Patient will improve pain free hip extension to 15 ° to resolve pain with upright standing. 3. Patient will be able to perform a side plank for 60 seconds to normalize strength and tone of the lateral trunk musculature. _________________________________________________________________________  Pre-treatment Symptoms/Complaints: notes increased soreness over the weekend which he attributes to increased intensity on airdyne. This has eased but continues to feel stiff and tender laterally through the low back. Pain: Initial: mild/10 Post Session:  No increase/10   Medications Last Reviewed:  2020  Updated Objective Findings:  Below measures from initial evaluation unless otherwise noted. Observation/Orthostatic Postural Assessment:    Increased tone present through lumbar extensors. Palpation:    Increased tenderness and tone through bilateral QL and multifi musculature, psoas musculature.   ROM:    Toe touch: fingers to ankles  Trunk extension: WNL  Hip rotation: WNL  Hip extension: 10 ° B with provocation on overpressure  Hip flexion: 120 °   SLR: 70 ° B  Strength:   Myotomal strength is 5/5     20 update; Mild tenderness present to QL and paraspinals but significantly diminished. TREATMENT:   THERAPEUTIC ACTIVITY: ( see below for minutes): Therapeutic activities per grid below to improve mobility, strength, balance and coordination. Required minimal visual, verbal, manual and tactile cues to improve independence and safety with daily activities . THERAPEUTIC EXERCISE: (see below for minutes):  Exercises per grid below to improve mobility, strength, balance and coordination. Required minimal verbal and manual cues to promote proper body alignment, promote proper body posture and promote proper body mechanics. Progressed resistance, range, repetitions and complexity of movement as indicated. MANUAL THERAPY: (see below for minutes): Joint mobilization and Soft tissue mobilization was utilized and necessary because of the patient's restricted joint motion, painful spasm, loss of articular motion and restricted motion of soft tissue. MODALITIES: (see below for minutes):      to decrease pain    SELF CARE: (see below for minutes): Procedure(s) (per grid) utilized to improve and/or restore self-care/home management as related to dressing, bathing and grooming. Required minimal verbal cueing to facilitate activities of daily living skills and compensatory activities.      Date: 11/30/20 (visit 5) 12/2/20 (visit 6) 12/9/20 (visit 7) 12/15/20 (visit 8) 12/17/20 (visit 9) 12/22/20 (visit 10) progress note 12/29/20 (visit 11)    Modalities:                                            Therapeutic Exercise:           Discussed HEP                                                            Reviewed SL thoracic rotation for upper back mobility Discussed added goblet squat to HEP     Proprioceptive Activities:                                            Manual Therapy: 45 min 45 min  45 min  45 min 45 min  45 min  45 min     Soft tissue mobilization STM with deep trigger point release to B QL and paraspinals into the lower thoracic spine Repeat; with increased deep trigger to thoracic paraspinals repeat STM with trigger point release to QL, paraspinals, hip flexors repeat repeat STM to lumbar paraspinals, trigger point release to lateral hip musculature               Therapeutic Activities:                                              HEP: see 11/12/20  flow sheet for specifics. [employer] Portal  Treatment/Session Summary:    · Response to Treatment: Increased tone present through lateral hip musculature and lumbar paraspinals. · Communication/Consultation:  None today  · Equipment provided today:  None today  · Recommendations/Intent for next treatment session: Next visit will focus on progression of strength and relief of pain. .    Total Treatment Billable Duration:  45 minutes  PT Patient Time In/Time Out  Time In: 1100  Time Out: Carlos Dupont DPT    Future Appointments   Date Time Provider Daisy Guevara   1/6/2021  2:45 PM Marcin Montenegro DPT [facility]          '

## 2021-01-06 ENCOUNTER — HOSPITAL ENCOUNTER (OUTPATIENT)
Dept: PHYSICAL THERAPY | Age: 71
Discharge: HOME | End: 2021-01-06
Payer: MEDICARE

## 2021-01-06 PROCEDURE — 97140 MANUAL THERAPY 1/> REGIONS: CPT

## 2021-01-06 NOTE — PROGRESS NOTES
Lenny Kamara  : 1950  [place]  Secondary: Cornel Powell @ [address] [address].  [phone]   [phone]      OUTPATIENT PHYSICAL THERAPY: Daily Treatment Note and Discharge  2021   Treatment Diagnosis: Low back pain (M54.5)  Effective Dates: 2020 TO 2021 (90 days). Frequency/Duration: 2 times a week for 90 Days  GOALS: (Goals have been discussed and agreed upon with patient.)  Short-Term Functional Goals: Time Frame: 2 weeks MET  1. Patient will be independent with HEP. 2. Patient will have no pain with bending forward to tie his shoes. Discharge Goals: Time Frame: 4 weeks MET  1. Patient will report no pain with daily activity. 2. Patient will improve pain free hip extension to 15 ° to resolve pain with upright standing. 3. Patient will be able to perform a side plank for 60 seconds to normalize strength and tone of the lateral trunk musculature. _________________________________________________________________________  Pre-treatment Symptoms/Complaints: Reports being able to participate with his full fitness routine. Feeling comfortable with bending and walking hills. Pain: Initial: mild/10 Post Session:  No increase/10   Medications Last Reviewed:  2021  Updated Objective Findings:  Below measures from initial evaluation unless otherwise noted. Observation/Orthostatic Postural Assessment:    Increased tone present through lumbar extensors. Palpation:    Increased tenderness and tone through bilateral QL and multifi musculature, psoas musculature.   ROM:    Toe touch: fingers to ankles  Trunk extension: WNL  Hip rotation: WNL  Hip extension: 10 ° B with provocation on overpressure  Hip flexion: 120 °   SLR: 70 ° B  Strength:   Myotomal strength is 5/5     20 update;   Mild tenderness present to QL and paraspinals but significantly diminished. TREATMENT:   THERAPEUTIC ACTIVITY: ( see below for minutes): Therapeutic activities per grid below to improve mobility, strength, balance and coordination. Required minimal visual, verbal, manual and tactile cues to improve independence and safety with daily activities . THERAPEUTIC EXERCISE: (see below for minutes):  Exercises per grid below to improve mobility, strength, balance and coordination. Required minimal verbal and manual cues to promote proper body alignment, promote proper body posture and promote proper body mechanics. Progressed resistance, range, repetitions and complexity of movement as indicated. MANUAL THERAPY: (see below for minutes): Joint mobilization and Soft tissue mobilization was utilized and necessary because of the patient's restricted joint motion, painful spasm, loss of articular motion and restricted motion of soft tissue. MODALITIES: (see below for minutes):      to decrease pain    SELF CARE: (see below for minutes): Procedure(s) (per grid) utilized to improve and/or restore self-care/home management as related to dressing, bathing and grooming. Required minimal verbal cueing to facilitate activities of daily living skills and compensatory activities.      Date: 11/30/20 (visit 5) 12/2/20 (visit 6) 12/9/20 (visit 7) 12/15/20 (visit 8) 12/17/20 (visit 9) 12/22/20 (visit 10) progress note 12/29/20 (visit 11) 1/6/20 (visit 12)   Modalities:                                            Therapeutic Exercise:           Discussed HEP                                                            Reviewed SL thoracic rotation for upper back mobility Discussed added goblet squat to HEP     Proprioceptive Activities:                                            Manual Therapy: 45 min 45 min  45 min  45 min 45 min  45 min  45 min  45 min    Soft tissue mobilization STM with deep trigger point release to B QL and paraspinals into the lower thoracic spine Repeat; with increased deep trigger to thoracic paraspinals repeat STM with trigger point release to QL, paraspinals, hip flexors repeat repeat STM to lumbar paraspinals, trigger point release to lateral hip musculature Repeat; including TP \"gun\" to thoracic paraspinals              Therapeutic Activities:                                              HEP: see 11/12/20  flow sheet for specifics. [employer] Portal  Treatment/Session Summary:    · Response to Treatment: Patient met all goals and returned to prior level of function. Will discharge to [facility]. · Communication/Consultation:  None today  · Equipment provided today:  None today  · Recommendations/Intent for next treatment session: d/c to HEP.      Total Treatment Billable Duration:  45 minutes  PT Patient Time In/Time Out  Time In: 1445  Time Out: 1400 [facility], T    No future appointments.       '

## 2021-07-22 PROBLEM — M50.30 DDD (DEGENERATIVE DISC DISEASE), CERVICAL: Status: ACTIVE | Noted: 2021-07-22

## 2021-11-10 ENCOUNTER — HOSPITAL ENCOUNTER (OUTPATIENT)
Dept: LAB | Age: 71
Discharge: HOME | End: 2021-11-10

## 2021-11-10 PROCEDURE — 88305 TISSUE EXAM BY PATHOLOGIST: CPT

## 2022-03-18 PROBLEM — Z86.010 PERSONAL HISTORY OF COLONIC POLYPS: Status: ACTIVE | Noted: 2020-07-06

## 2022-03-18 PROBLEM — Z80.42 FAMILY HISTORY OF PROSTATE CANCER IN FATHER: Status: ACTIVE | Noted: 2020-07-06

## 2022-03-18 PROBLEM — Z86.0100 PERSONAL HISTORY OF COLONIC POLYPS: Status: ACTIVE | Noted: 2020-07-06

## 2022-03-19 PROBLEM — R10.31 RIGHT GROIN PAIN: Status: ACTIVE | Noted: 2019-07-03

## 2022-03-19 PROBLEM — M50.30 DDD (DEGENERATIVE DISC DISEASE), CERVICAL: Status: ACTIVE | Noted: 2021-07-22

## 2022-03-19 PROBLEM — Z80.0 FAMILY HISTORY OF COLON CANCER IN FATHER: Status: ACTIVE | Noted: 2020-07-06

## 2022-03-19 PROBLEM — G60.9 HEREDITARY AND IDIOPATHIC PERIPHERAL NEUROPATHY: Status: ACTIVE | Noted: 2020-06-24

## 2022-03-19 PROBLEM — K40.90 NON-RECURRENT UNILATERAL INGUINAL HERNIA WITHOUT OBSTRUCTION OR GANGRENE: Status: ACTIVE | Noted: 2019-03-27

## 2022-04-07 ENCOUNTER — HOSPITAL ENCOUNTER (OUTPATIENT)
Dept: PHYSICAL THERAPY | Age: 72
Discharge: HOME | End: 2022-04-07
Payer: MEDICARE

## 2022-04-07 PROCEDURE — 97140 MANUAL THERAPY 1/> REGIONS: CPT

## 2022-04-07 PROCEDURE — 97110 THERAPEUTIC EXERCISES: CPT

## 2022-04-07 PROCEDURE — 97161 PT EVAL LOW COMPLEX 20 MIN: CPT

## 2022-04-07 NOTE — PROGRESS NOTES
Charlotte Carter  : 1950  [place]  Secondary: [place] @ [address], [address] [place] [address]  [phone]   [phone]      OUTPATIENT PHYSICAL THERAPY: Daily Treatment Note  2022   ICD-10: Treatment Diagnosis: Low back pain (M54.5) and Difficulty in walking, not elsewhere classified (R26.2)  Effective Dates: 2022 TO 2022 (90 days). Frequency/Duration: 2 times a week for 90 Days  GOALS: (Goals have been discussed and agreed upon with patient.)  Short-Term Functional Goals: Time Frame: 2 weeks  1. Patient will be independent with HEP. 2. Patient will decrease pain to allow him to sleep through the night. 3. Patient will decrease muscular tone to allow for upright standing without pain. Discharge Goals: Time Frame: 4 weeks  1. Patient will report no pain with daily activity. 2. Patient will be able to perform prone plank for 30 seconds to normalize trunk stability for return to prior level of function. 3. Patient will have no pain with walking >20 minutes to restore pain free community mobility.    _________________________________________________________________________  Pre-treatment Symptoms/Complaints:  See initial evaluation. Pain: Initial: moderate/10 Post Session:  No increase/10   Medications Last Reviewed:  2022  Updated Objective Findings:  Below measures from initial evaluation unless otherwise noted. Observation/Orthostatic Postural Assessment:    Unable to stand fully erect. Palpation:    Tender to lumbar paraspinals bilaterally. Tender to transverse processes of left lower lumbar spine.    ROM:    Hip mobility is WNL  Toe touch: fingers to mid shin   Extension: to neutral   Strength:   myotomal strength is 5/5 throughout  Special Tests:    Neural tension testing is normal  Neurological Screen:  Reflexes and sensation are grossly intact  Functional Mobility:   WNL  Balance: WNL     TREATMENT:   THERAPEUTIC ACTIVITY: ( see below for minutes): Therapeutic activities per grid below to improve mobility, strength, balance and coordination. Required minimal visual, verbal, manual and tactile cues to improve independence and safety with daily activities . THERAPEUTIC EXERCISE: (see below for minutes):  Exercises per grid below to improve mobility, strength, balance and coordination. Required minimal verbal and manual cues to promote proper body alignment, promote proper body posture and promote proper body mechanics. Progressed resistance, range, repetitions and complexity of movement as indicated. MANUAL THERAPY: (see below for minutes): Joint mobilization and Soft tissue mobilization was utilized and necessary because of the patient's restricted joint motion, painful spasm, loss of articular motion and restricted motion of soft tissue. MODALITIES: (see below for minutes):      to decrease pain    SELF CARE: (see below for minutes): Procedure(s) (per grid) utilized to improve and/or restore self-care/home management as related to dressing, bathing and grooming. Required minimal verbal cueing to facilitate activities of daily living skills and compensatory activities. Date: 4/7/22       Modalities:                                Therapeutic Exercise: 10 min        Education On stabilization exercise and modification to exercise routine                                               Proprioceptive Activities:                                Manual Therapy: 20 min        Soft tissue mobilization To lumbar paraspinals, gluteal musculature B                Therapeutic Activities:                                  HEP: see 4/7/22 flow sheet for specifics.     [employer] Portal  Treatment/Session Summary:    · Response to Treatment:  Patient is independent with performance of above described home program.  · Communication/Consultation:  None today  · Equipment provided today:  None today  · Recommendations/Intent for next treatment session: Next visit will focus on progression of strength and return to prior level of function.     Total Treatment Billable Duration:  45 minutes  PT Patient Time In/Time Out  Time In: 0930  Time Out: 651 CHANA Andre DPT    Future Appointments   Date Time Provider Daisy Guevara   4/12/2022  9:30 AM Romario Mcgovern DPT [facility]   4/14/2022  3:30 PM Romario Mcgovern DPT [facility]

## 2022-04-07 NOTE — THERAPY EVALUATION
Erik Funes  : 1950 [address], [address] [place] [address]  [phone]   [phone]        OUTPATIENT PHYSICAL THERAPY:Initial Assessment 2022         ICD-10: Treatment Diagnosis: Low back pain (M54.5) and Difficulty in walking, not elsewhere classified (R26.2)  Precautions/Allergies:   Patient has no known allergies. Fall Risk Score:     Ambulatory/Rehab Services H2 Model Falls Risk Assessment    Risk Factors:       (1)  Gender [Male] Ability to Rise from Chair:       (0)  Ability to rise in a single movement    Falls Prevention Plan:       No modifications necessary   Total: (5 or greater = High Risk): 1     [facility] [employer]. All Rights Reserved. [facility] Patent #[id number]. Federal Law prohibits the replication, distribution or use without written permission from [facility] [place]     MD Orders: evaluate and treat MEDICAL/REFERRING DIAGNOSIS:  Back pain [M54.9]   DATE OF ONSET: 22  REFERRING PHYSICIAN: Fede Bennett [id number]: not currently scheduled      INITIAL ASSESSMENT:  Mr. Tiffanie Cai presents to therapy with acute on chronic recurrence of low back pain. This is classified as responding to gentle mobilization and stabilization training. Symptoms are muscular in nature with presence of increased protective tone of the segmental stabilizing musculature. He has restricted mobility into lumbar extension due to pain and tightness of the musculature limiting trunk movement into flexion. Increased pain limits ability to fully stand upright. He has no signs of neurological involvement. Impairments cause difficulty with standing and walking tasks. Skilled therapy is required to return to prior level of function. PROBLEM LIST (Impacting functional limitations):  1. Decreased Strength  2. Decreased ADL/Functional Activities  3. Decreased Transfer Abilities  4.  Decreased Ambulation Ability/Technique  5. Increased Pain  6. Decreased Activity Tolerance  7. Decreased Flexibility/Joint Mobility INTERVENTIONS PLANNED:  1. Electrical Stimulation  2. Family Education  3. Gait Training  4. Home Exercise Program (HEP)  5. Manual Therapy  6. Neuromuscular Re-education/Strengthening  7. Range of Motion (ROM)  8. Therapeutic Activites  9. Therapeutic Exercise/Strengthening  10. modalities    TREATMENT PLAN:  Effective Dates: 4/7/2022 TO 7/7/2022 (90 days). Frequency/Duration: 2 times a week for 90 Days  GOALS: (Goals have been discussed and agreed upon with patient.)  Short-Term Functional Goals: Time Frame: 2 weeks  1. Patient will be independent with HEP. 2. Patient will decrease pain to allow him to sleep through the night. 3. Patient will decrease muscular tone to allow for upright standing without pain. Discharge Goals: Time Frame: 4 weeks  1. Patient will report no pain with daily activity. 2. Patient will be able to perform prone plank for 30 seconds to normalize trunk stability for return to prior level of function. 3. Patient will have no pain with walking >20 minutes to restore pain free community mobility. Rehabilitation Potential For Stated Goals: Excellent  Regarding Aleta Chandra's therapy, I certify that the treatment plan above will be carried out by a therapist or under their direction. Thank you for this referral,  Kermit Holt DPT       Referring Physician Signature: Luisa Alonso*              Date                      HISTORY:   History of Present Injury/Illness (Reason for Referral):  Patient presents to therapy with primary complaint of low back pain. Initial symptoms began on 4/4 when he began to feel his back starting to get tight. This progressively worsened through the evening and began to notice tightness throughout the right side of his low back Tuesday, causing him to not go on his normal walk.  He woke Wednesday morning due to pain with pain extending laterally into the left side. He denies any symptoms into the lower extremities. He has difficulty with fully standing upright or finding a comfortable position. He suspects he may have caused this by introducing leg pressing into his normal exercise routine over the previous weekend as he has had no change otherwise to his normal routine. Past Medical History/Comorbidities:   Mr. Kary Francois  has a past medical history of Acute sinusitis, Arthritis, Bilateral impacted cerumen, Chronic sinusitis, Conductive hearing loss, Esophageal reflux, Hearing loss, Hearing loss due to cerumen impaction, Itching of ear, and Left knee pain. Mr. Kary Francois  has a past surgical history that includes hx lumbar laminectomy; hx tonsillectomy; hx wisdom teeth extraction; and hx colonoscopy. Social History/Living Environment:     Patient lives at home with his family. Prior Level of Function/Work/Activity:  Patient is retired. He enjoys walking up to 6 miles on a daily basis. Dominant Side:         RIGHT  Current Medications:    Current Outpatient Medications:     meloxicam (MOBIC) 15 mg tablet, Take 1 Tablet by mouth daily. , Disp: 30 Tablet, Rfl: 3    mometasone (ELOCON) 0.1 % ointment, Apply  to affected area daily. , Disp: 15 g, Rfl: 3    neomycin-polymyxin-hydrocortisone, buffered, (PEDIOTIC) 3.5-10,000-1 mg/mL-unit/mL-% otic suspension, Administer 3 Drops in right ear three (3) times daily. Indications: otitis, Disp: 10 mL, Rfl: 1    ascorbic acid, vitamin C, (VITAMIN C) 500 mg tablet, Take  by mouth daily. , Disp: , Rfl:     cholecalciferol (VITAMIN D3) 25 mcg (1,000 unit) cap, Take  by mouth daily. , Disp: , Rfl:     cyanocobalamin (VITAMIN B12) 500 mcg tablet, Take  by mouth daily. , Disp: , Rfl:     multivitamin (ONE A DAY) tablet, 1 PO DAILY, Disp: , Rfl:     tadalafiL (CIALIS) 5 mg tablet, Take 5 mg by mouth daily as needed. , Disp: , Rfl:     aspirin delayed-release 81 mg tablet, Take 81 mg by mouth daily. Indications: prevention, Disp: , Rfl:     cetirizine (ZYRTEC) 10 mg tablet, Take 10 mg by mouth every other day., Disp: , Rfl:    Date Last Reviewed:  4/7/2022   # of Personal Factors/Comorbidities that affect the Plan of Care: 0: LOW COMPLEXITY   EXAMINATION:   Observation/Orthostatic Postural Assessment:    Unable to stand fully erect. Palpation:    Tender to lumbar paraspinals bilaterally. Tender to transverse processes of left lower lumbar spine. ROM:    Hip mobility is WNL  Toe touch: fingers to mid shin   Extension: to neutral   Strength:   myotomal strength is 5/5 throughout  Special Tests:    Neural tension testing is normal  Neurological Screen:  Reflexes and sensation are grossly intact  Functional Mobility:   WNL  Balance: WNL   Body Structures Involved:  1. Nerves  2. Bones  3. Joints  4. Muscles  5. Ligaments Body Functions Affected:  1. Sensory/Pain  2. Neuromusculoskeletal  3. Movement Related Activities and Participation Affected:  1. General Tasks and Demands  2. Mobility  3. Self Care  4. Domestic Life  5. Interpersonal Interactions and Relationships  6. Community, Social and [place]   # of elements that affect the Plan of Care: 1-2: LOW COMPLEXITY   CLINICAL PRESENTATION:   Presentation: Stable and uncomplicated: LOW COMPLEXITY   CLINICAL DECISION MAKING:   Tool Used: Modified Oswestry Low Back Pain Questionnaire  Score:  Initial: 24/50  Most Recent: X/50 (Date: -- )   Interpretation of Score: Each section is scored on a 0-5 scale, 5 representing the greatest disability. The scores of each section are added together for a total score of 50.           Medical Necessity:   · Patient is expected to demonstrate progress in strength, range of motion, balance and coordination  ·  to increase independence with community mobility and return to prior level of function  · .  Reason for Services/Other Comments:  · Patient has demonstrated an improvement in functional level by independent performance of HEP.    · .   Use of outcome tool(s) and clinical judgement create a POC that gives a: Clear prediction of patient's progress: LOW COMPLEXITY     Total Treatment Duration:  PT Patient Time In/Time Out  Time In: 0930  Time Out: 0295 [facility], T

## 2022-04-12 ENCOUNTER — HOSPITAL ENCOUNTER (OUTPATIENT)
Dept: PHYSICAL THERAPY | Age: 72
Discharge: HOME | End: 2022-04-12
Payer: MEDICARE

## 2022-04-12 PROCEDURE — 97140 MANUAL THERAPY 1/> REGIONS: CPT

## 2022-04-12 NOTE — PROGRESS NOTES
Alistair Pérez  : 1950  [place]  Secondary: [place] @ [address], [address] [place] [address]  [phone]   [phone]      OUTPATIENT PHYSICAL THERAPY: Daily Treatment Note  2022   ICD-10: Treatment Diagnosis: Low back pain (M54.5) and Difficulty in walking, not elsewhere classified (R26.2)  Effective Dates: 2022 TO 2022 (90 days). Frequency/Duration: 2 times a week for 90 Days  GOALS: (Goals have been discussed and agreed upon with patient.)  Short-Term Functional Goals: Time Frame: 2 weeks  1. Patient will be independent with HEP. 2. Patient will decrease pain to allow him to sleep through the night. 3. Patient will decrease muscular tone to allow for upright standing without pain. Discharge Goals: Time Frame: 4 weeks  1. Patient will report no pain with daily activity. 2. Patient will be able to perform prone plank for 30 seconds to normalize trunk stability for return to prior level of function. 3. Patient will have no pain with walking >20 minutes to restore pain free community mobility.    _________________________________________________________________________  Pre-treatment Symptoms/Complaints:  Notes the back has been feeling much better. No longer described as painful or even discomfort, but still experiencing tightness through the low back. This is on both sides and he notes it moves around a bit. Pain: Initial: mild/10 Post Session:  No increase/10   Medications Last Reviewed:  2022  Updated Objective Findings:  Below measures from initial evaluation unless otherwise noted. Observation/Orthostatic Postural Assessment:    Unable to stand fully erect. Palpation:    Tender to lumbar paraspinals bilaterally. Tender to transverse processes of left lower lumbar spine.    ROM:    Hip mobility is WNL  Toe touch: fingers to mid shin   Extension: to neutral   Strength:   myotomal strength is 5/5 throughout  Special Tests:    Neural tension testing is normal  Neurological Screen:  Reflexes and sensation are grossly intact  Functional Mobility:   WNL  Balance: WNL     TREATMENT:   THERAPEUTIC ACTIVITY: ( see below for minutes): Therapeutic activities per grid below to improve mobility, strength, balance and coordination. Required minimal visual, verbal, manual and tactile cues to improve independence and safety with daily activities . THERAPEUTIC EXERCISE: (see below for minutes):  Exercises per grid below to improve mobility, strength, balance and coordination. Required minimal verbal and manual cues to promote proper body alignment, promote proper body posture and promote proper body mechanics. Progressed resistance, range, repetitions and complexity of movement as indicated. MANUAL THERAPY: (see below for minutes): Joint mobilization and Soft tissue mobilization was utilized and necessary because of the patient's restricted joint motion, painful spasm, loss of articular motion and restricted motion of soft tissue. MODALITIES: (see below for minutes):      to decrease pain    SELF CARE: (see below for minutes): Procedure(s) (per grid) utilized to improve and/or restore self-care/home management as related to dressing, bathing and grooming. Required minimal verbal cueing to facilitate activities of daily living skills and compensatory activities.      Date: 4/7/22 4/12/22 (visit 2)      Modalities:                                Therapeutic Exercise: 10 min  5 min       Education On stabilization exercise and modification to exercise routine reviewed                                              Proprioceptive Activities:                                Manual Therapy: 20 min  40 min       Soft tissue mobilization To lumbar paraspinals, gluteal musculature B  STM to superior gluteal musculature, QL and lumbar paraspinals              Therapeutic Activities:                                  HEP: see 4/7/22 flow sheet for specifics. [employer] Please Portal  Treatment/Session Summary:    · Response to Treatment:  Significantly decreased tenderness to lumbar musculature with improved soft tissue mobility. · Communication/Consultation:  None today  · Equipment provided today:  None today  · Recommendations/Intent for next treatment session: Next visit will focus on progression of strength and return to prior level of function.     Total Treatment Billable Duration:  45 minutes  PT Patient Time In/Time Out  Time In: 0930  Time Out: [address] DPT    Future Appointments   Date Time Provider Daisy Guevara   4/14/2022  3:30 PM Liz Fernandez DPT [facility]

## 2022-04-14 ENCOUNTER — HOSPITAL ENCOUNTER (OUTPATIENT)
Dept: PHYSICAL THERAPY | Age: 72
Discharge: HOME | End: 2022-04-14
Payer: MEDICARE

## 2022-04-14 PROCEDURE — 97140 MANUAL THERAPY 1/> REGIONS: CPT

## 2022-04-14 NOTE — PROGRESS NOTES
Cameron Glover  : 1950  [place]  Secondary: [place] @ [address] [place]  [phone]   [phone]      OUTPATIENT PHYSICAL THERAPY: Daily Treatment Note  2022   ICD-10: Treatment Diagnosis: Low back pain (M54.5) and Difficulty in walking, not elsewhere classified (R26.2)  Effective Dates: 2022 TO 2022 (90 days). Frequency/Duration: 2 times a week for 90 Days  GOALS: (Goals have been discussed and agreed upon with patient.)  Short-Term Functional Goals: Time Frame: 2 weeks  1. Patient will be independent with HEP. 2. Patient will decrease pain to allow him to sleep through the night. 3. Patient will decrease muscular tone to allow for upright standing without pain. Discharge Goals: Time Frame: 4 weeks  1. Patient will report no pain with daily activity. 2. Patient will be able to perform prone plank for 30 seconds to normalize trunk stability for return to prior level of function. 3. Patient will have no pain with walking >20 minutes to restore pain free community mobility.    _________________________________________________________________________  Pre-treatment Symptoms/Complaints: continuing to notice improvement. Able to walk further. Still has discomfort with transitioning from sitting to standing. Pain: Initial: mild/10 Post Session:  No increase/10   Medications Last Reviewed:  2022  Updated Objective Findings:  Below measures from initial evaluation unless otherwise noted. Observation/Orthostatic Postural Assessment:    Unable to stand fully erect. Palpation:    Tender to lumbar paraspinals bilaterally. Tender to transverse processes of left lower lumbar spine.    ROM:    Hip mobility is WNL  Toe touch: fingers to mid shin   Extension: to neutral   Strength:   myotomal strength is 5/5 throughout  Special Tests:    Neural tension testing is normal  Neurological Screen:  Reflexes and sensation are grossly intact  Functional Mobility:   WNL  Balance: WNL     TREATMENT:   THERAPEUTIC ACTIVITY: ( see below for minutes): Therapeutic activities per grid below to improve mobility, strength, balance and coordination. Required minimal visual, verbal, manual and tactile cues to improve independence and safety with daily activities . THERAPEUTIC EXERCISE: (see below for minutes):  Exercises per grid below to improve mobility, strength, balance and coordination. Required minimal verbal and manual cues to promote proper body alignment, promote proper body posture and promote proper body mechanics. Progressed resistance, range, repetitions and complexity of movement as indicated. MANUAL THERAPY: (see below for minutes): Joint mobilization and Soft tissue mobilization was utilized and necessary because of the patient's restricted joint motion, painful spasm, loss of articular motion and restricted motion of soft tissue. MODALITIES: (see below for minutes):      to decrease pain    SELF CARE: (see below for minutes): Procedure(s) (per grid) utilized to improve and/or restore self-care/home management as related to dressing, bathing and grooming. Required minimal verbal cueing to facilitate activities of daily living skills and compensatory activities.      Date: 4/7/22 4/12/22 (visit 2) 4/14/22 (visit 3)     Modalities:                                Therapeutic Exercise: 10 min  5 min       Education On stabilization exercise and modification to exercise routine reviewed                                              Proprioceptive Activities:                                Manual Therapy: 20 min  40 min  45 min      Soft tissue mobilization To lumbar paraspinals, gluteal musculature B  STM to superior gluteal musculature, QL and lumbar paraspinals repeat             Therapeutic Activities:                                  HEP: see 4/7/22 flow sheet for specifics. [employer] Portal  Treatment/Session Summary:    · Response to Treatment:  Continues to have mild limitation in soft tissue mobility through the lumbar spine. This contributes to decreased fluidity of movement with sit to stand. · Communication/Consultation:  None today  · Equipment provided today:  None today  · Recommendations/Intent for next treatment session: Next visit will focus on progression of strength and return to prior level of function.     Total Treatment Billable Duration:  45 minutes  PT Patient Time In/Time Out  Time In: 7  Time Out: [address] DPT    Future Appointments   Date Time Provider Daisy Guevara   4/20/2022 11:45 AM Aidan Foster DPT [facility]

## 2022-04-20 ENCOUNTER — HOSPITAL ENCOUNTER (OUTPATIENT)
Dept: PHYSICAL THERAPY | Age: 72
Discharge: HOME | End: 2022-04-20
Payer: MEDICARE

## 2022-04-20 PROCEDURE — 97140 MANUAL THERAPY 1/> REGIONS: CPT

## 2022-04-20 NOTE — PROGRESS NOTES
Erik Funes  : 1950  [place]  Secondary: [place] @ [address], [place].  [phone]   [phone]      OUTPATIENT PHYSICAL THERAPY: Daily Treatment Note and Discharge  2022   ICD-10: Treatment Diagnosis: Low back pain (M54.5) and Difficulty in walking, not elsewhere classified (R26.2)  Effective Dates: 2022 TO 2022 (90 days). Frequency/Duration: 2 times a week for 90 Days  GOALS: (Goals have been discussed and agreed upon with patient.)  Short-Term Functional Goals: Time Frame: 2 weeks MET  1. Patient will be independent with HEP. 2. Patient will decrease pain to allow him to sleep through the night. 3. Patient will decrease muscular tone to allow for upright standing without pain. Discharge Goals: Time Frame: 4 weeks MET  1. Patient will report no pain with daily activity. 2. Patient will be able to perform prone plank for 30 seconds to normalize trunk stability for return to prior level of function. 3. Patient will have no pain with walking >20 minutes to restore pain free community mobility.    _________________________________________________________________________  Pre-treatment Symptoms/Complaints: Notes the back has been feeling good. No difficulty with walking. Has returned to normal exercise routine. Pain: Initial: 0/10 Post Session:  No increase/10   Medications Last Reviewed:  2022  Updated Objective Findings:  Below measures from initial evaluation unless otherwise noted. Observation/Orthostatic Postural Assessment:    Unable to stand fully erect. Palpation:    Tender to lumbar paraspinals bilaterally. Tender to transverse processes of left lower lumbar spine.    ROM:    Hip mobility is WNL  Toe touch: fingers to mid shin   Extension: to neutral   Strength:   myotomal strength is 5/5 throughout  Special Tests:    Neural tension testing is normal  Neurological Screen:  Reflexes and sensation are grossly intact  Functional Mobility:   WNL  Balance: WNL     TREATMENT:   THERAPEUTIC ACTIVITY: ( see below for minutes): Therapeutic activities per grid below to improve mobility, strength, balance and coordination. Required minimal visual, verbal, manual and tactile cues to improve independence and safety with daily activities . THERAPEUTIC EXERCISE: (see below for minutes):  Exercises per grid below to improve mobility, strength, balance and coordination. Required minimal verbal and manual cues to promote proper body alignment, promote proper body posture and promote proper body mechanics. Progressed resistance, range, repetitions and complexity of movement as indicated. MANUAL THERAPY: (see below for minutes): Joint mobilization and Soft tissue mobilization was utilized and necessary because of the patient's restricted joint motion, painful spasm, loss of articular motion and restricted motion of soft tissue. MODALITIES: (see below for minutes):      to decrease pain    SELF CARE: (see below for minutes): Procedure(s) (per grid) utilized to improve and/or restore self-care/home management as related to dressing, bathing and grooming. Required minimal verbal cueing to facilitate activities of daily living skills and compensatory activities.      Date: 4/7/22 4/12/22 (visit 2) 4/14/22 (visit 3) 4/20/22 (visit 4)    Modalities:                                Therapeutic Exercise: 10 min  5 min       Education On stabilization exercise and modification to exercise routine reviewed                                              Proprioceptive Activities:                                Manual Therapy: 20 min  40 min  45 min  45 min     Soft tissue mobilization To lumbar paraspinals, gluteal musculature B  STM to superior gluteal musculature, QL and lumbar paraspinals repeat STM to superior glueteal musculature, QL and lumbar paraspinals            Therapeutic Activities:                                  HEP: see 4/7/22 flow sheet for specifics. [employer] Portal  Treatment/Session Summary:    · Response to Treatment: Tone has normalized to the gluteal and trunk musculature. Has returned to prior level of function. Will d/c to HEP. · Communication/Consultation:  None today  · Equipment provided today:  None today  · Recommendations/Intent for next treatment session: d/c to HEP. Total Treatment Billable Duration:  45 minutes  PT Patient Time In/Time Out  Time In: 1041  Time Out: [address] DPT    No future appointments.

## 2023-08-04 NOTE — PROGRESS NOTES
The following abstract was written by Richard Jolly, CTR:     NEW PATIENT INTAKE      Referral Diagnosis: MGUS    Referring Provider: Self referral    Primary Care Provider: Tenzin Jensen MD    Presenting Symptoms: small M-Jose    Family/ Social/ Medical/ Surgical History Updated in Epic: No     Chronological History of Pertinent Events:     Notes from Referring Provider:    69-year-old white male. Labs performed at [facility] (found in [facility])    05/04/23 Mk Leung ([address] [place] [address]): presented for evaluation of paraproteinemia. Is a new patient of Dr. Mart Heller ([facility]). He is evaluating him for idiopathic neuropathy. SPEP reveals a small M-spike of 0.4. No evidence of organ damage. Repeating labs. Presented at Tumor Board: No    Other Pertinent Information: Patients brother is being treated for multiple myeloma and is under the care of Dr. Liliane Chong at [facility].  Patient would like to be managed/followed by Dr. Liliane Chong

## 2023-08-09 ENCOUNTER — HOSPITAL ENCOUNTER (OUTPATIENT)
Dept: LAB | Age: 73
Discharge: HOME | End: 2023-08-12
Payer: MEDICARE

## 2023-08-09 ENCOUNTER — OFFICE VISIT (OUTPATIENT)
Dept: ONCOLOGY | Age: 73
End: 2023-08-09
Payer: MEDICARE

## 2023-08-09 VITALS
SYSTOLIC BLOOD PRESSURE: 116 MMHG | TEMPERATURE: 97.5 F | BODY MASS INDEX: 22.92 KG/M2 | WEIGHT: 160.1 LBS | HEIGHT: 70 IN | DIASTOLIC BLOOD PRESSURE: 71 MMHG | OXYGEN SATURATION: 99 % | HEART RATE: 60 BPM | RESPIRATION RATE: 17 BRPM

## 2023-08-09 DIAGNOSIS — D47.2 MGUS (MONOCLONAL GAMMOPATHY OF UNKNOWN SIGNIFICANCE): ICD-10-CM

## 2023-08-09 DIAGNOSIS — E85.81 LIGHT CHAIN (AL) AMYLOIDOSIS (HCC): ICD-10-CM

## 2023-08-09 DIAGNOSIS — D47.2 MGUS (MONOCLONAL GAMMOPATHY OF UNKNOWN SIGNIFICANCE): Primary | ICD-10-CM

## 2023-08-09 LAB
ALBUMIN SERPL-MCNC: 3.9 G/DL (ref 3.2–4.6)
ALBUMIN/GLOB SERPL: 0.9 (ref 0.4–1.6)
ALP SERPL-CCNC: 90 U/L (ref 50–136)
ALT SERPL-CCNC: 51 U/L (ref 12–65)
ANION GAP SERPL CALC-SCNC: 3 MMOL/L (ref 2–11)
AST SERPL-CCNC: 50 U/L (ref 15–37)
BASOPHILS # BLD: 0 K/UL (ref 0–0.2)
BASOPHILS NFR BLD: 0 % (ref 0–2)
BILIRUB SERPL-MCNC: 0.5 MG/DL (ref 0.2–1.1)
BUN SERPL-MCNC: 17 MG/DL (ref 8–23)
CALCIUM SERPL-MCNC: 9 MG/DL (ref 8.3–10.4)
CHLORIDE SERPL-SCNC: 104 MMOL/L (ref 101–110)
CO2 SERPL-SCNC: 31 MMOL/L (ref 21–32)
CREAT SERPL-MCNC: 1.2 MG/DL (ref 0.8–1.5)
DIFFERENTIAL METHOD BLD: ABNORMAL
EOSINOPHIL # BLD: 0.1 K/UL (ref 0–0.8)
EOSINOPHIL NFR BLD: 2 % (ref 0.5–7.8)
ERYTHROCYTE [DISTWIDTH] IN BLOOD BY AUTOMATED COUNT: 13.1 % (ref 11.9–14.6)
GLOBULIN SER CALC-MCNC: 4.4 G/DL (ref 2.8–4.5)
GLUCOSE SERPL-MCNC: 83 MG/DL (ref 65–100)
HCT VFR BLD AUTO: 43.5 % (ref 41.1–50.3)
HGB BLD-MCNC: 14.5 G/DL (ref 13.6–17.2)
IMM GRANULOCYTES # BLD AUTO: 0 K/UL (ref 0–0.5)
IMM GRANULOCYTES NFR BLD AUTO: 0 % (ref 0–5)
LYMPHOCYTES # BLD: 1.2 K/UL (ref 0.5–4.6)
LYMPHOCYTES NFR BLD: 24 % (ref 13–44)
MCH RBC QN AUTO: 30.7 PG (ref 26.1–32.9)
MCHC RBC AUTO-ENTMCNC: 33.3 G/DL (ref 31.4–35)
MCV RBC AUTO: 92.2 FL (ref 82–102)
MONOCYTES # BLD: 0.6 K/UL (ref 0.1–1.3)
MONOCYTES NFR BLD: 11 % (ref 4–12)
NEUTS SEG # BLD: 3.2 K/UL (ref 1.7–8.2)
NEUTS SEG NFR BLD: 63 % (ref 43–78)
NRBC # BLD: 0 K/UL (ref 0–0.2)
NT PRO BNP: 216 PG/ML (ref 5–125)
PLATELET # BLD AUTO: 143 K/UL (ref 150–450)
PLATELET COMMENT: SLIGHT
PMV BLD AUTO: 11.9 FL (ref 9.4–12.3)
POTASSIUM SERPL-SCNC: 3.7 MMOL/L (ref 3.5–5.1)
PROT SERPL-MCNC: 8.3 G/DL (ref 6.3–8.2)
RBC # BLD AUTO: 4.72 M/UL (ref 4.23–5.6)
RBC MORPH BLD: ABNORMAL
SODIUM SERPL-SCNC: 138 MMOL/L (ref 133–143)
WBC # BLD AUTO: 5.1 K/UL (ref 4.3–11.1)
WBC MORPH BLD: ABNORMAL

## 2023-08-09 PROCEDURE — 84165 PROTEIN E-PHORESIS SERUM: CPT

## 2023-08-09 PROCEDURE — 85025 COMPLETE CBC W/AUTO DIFF WBC: CPT

## 2023-08-09 PROCEDURE — G8420 CALC BMI NORM PARAMETERS: HCPCS | Performed by: INTERNAL MEDICINE

## 2023-08-09 PROCEDURE — 82784 ASSAY IGA/IGD/IGG/IGM EACH: CPT

## 2023-08-09 PROCEDURE — 1123F ACP DISCUSS/DSCN MKR DOCD: CPT | Performed by: INTERNAL MEDICINE

## 2023-08-09 PROCEDURE — 1036F TOBACCO NON-USER: CPT | Performed by: INTERNAL MEDICINE

## 2023-08-09 PROCEDURE — 36415 COLL VENOUS BLD VENIPUNCTURE: CPT

## 2023-08-09 PROCEDURE — G8428 CUR MEDS NOT DOCUMENT: HCPCS | Performed by: INTERNAL MEDICINE

## 2023-08-09 PROCEDURE — 80053 COMPREHEN METABOLIC PANEL: CPT

## 2023-08-09 PROCEDURE — 3017F COLORECTAL CA SCREEN DOC REV: CPT | Performed by: INTERNAL MEDICINE

## 2023-08-09 PROCEDURE — 83521 IG LIGHT CHAINS FREE EACH: CPT

## 2023-08-09 PROCEDURE — 99205 OFFICE O/P NEW HI 60 MIN: CPT | Performed by: INTERNAL MEDICINE

## 2023-08-09 PROCEDURE — 83880 ASSAY OF NATRIURETIC PEPTIDE: CPT

## 2023-08-09 PROCEDURE — 86334 IMMUNOFIX E-PHORESIS SERUM: CPT

## 2023-08-09 RX ORDER — HUMAN IMMUNOGLOBULIN G 5 G/50ML
SOLUTION INTRAVENOUS
COMMUNITY
Start: 2023-08-03

## 2023-08-09 RX ORDER — ASPIRIN 81 MG/1
81 TABLET, CHEWABLE ORAL DAILY
COMMUNITY

## 2023-08-09 RX ORDER — CETIRIZINE HYDROCHLORIDE 10 MG/1
10 TABLET ORAL
COMMUNITY
Start: 2012-10-26

## 2023-08-09 RX ORDER — ASCORBIC ACID 500 MG
TABLET ORAL
COMMUNITY

## 2023-08-09 RX ORDER — ROSUVASTATIN CALCIUM 10 MG/1
TABLET, COATED ORAL
COMMUNITY
Start: 2023-07-11

## 2023-08-09 RX ORDER — TADALAFIL 5 MG/1
5 TABLET ORAL DAILY PRN
COMMUNITY
Start: 2018-11-16

## 2023-08-09 ASSESSMENT — PATIENT HEALTH QUESTIONNAIRE - PHQ9
2. FEELING DOWN, DEPRESSED OR HOPELESS: 0
1. LITTLE INTEREST OR PLEASURE IN DOING THINGS: 0
SUM OF ALL RESPONSES TO PHQ QUESTIONS 1-9: 0
SUM OF ALL RESPONSES TO PHQ9 QUESTIONS 1 & 2: 0

## 2023-08-09 NOTE — PATIENT INSTRUCTIONS
Patient Instructions from Today's Visit    Reason for Visit:  New patient appointment     Plan:  The lab work you did showed some abnormal proteins in your blood. This is called monoclonal gammopathy of unknown significance (MGUS). Most of the time, this requires no treatment and does not mean anything. In a very small percentage of people with this condition, it can transform into a type of blood cancer called multiple myeloma. Multiple myeloma likes to show up in the kidneys and in the bones. Because of this we need to do some screening tests to make sure this is not related to multiple myeloma- lab work, 24 hour urine studies, and xrays of your whole body. If all of this comes back reassuring, we will just need to monitor your labs a few times a year. Things we look out for are CRAB symptoms (high Calcium, Renal failure, Anemia, Bony disease). Follow Up: Follow up in    Recent Lab Results:      Treatment Summary has been discussed and given to patient:         -------------------------------------------------------------------------------------------------------------------  Please call our office at [phone] if you have any  of the following symptoms:   Fever of 100.5 or greater  Chills  Shortness of breath  Swelling or pain in one leg    After office hours an answering service is available and will contact a provider for emergencies or if you are experiencing any of the above symptoms. Patient did express an interest in My Chart. My Chart log in information explained on the after visit summary printout at the [address]  desk.     Ananda Ricardo RN

## 2023-08-09 NOTE — PROGRESS NOTES
Outpatient Consult Note    Data Source: Patient, [facility] record. 8/9/2023  10:26 AM      Marlene Torrez [id number]    88 y.o. Patient Encounter: [facility] Note    Reason for consult:  MGUS      HPI:  65-year-old , runner, family history with brother with multiple myeloma under my care, history of dyslipidemia, idiopathic neuropathy for which he is seeing neurology at [facility], and found to have a small M spike on SPEP as part of workup. He has seen my colleague Dr. Lourdes Ramirez at [facility], with records of no evidence of organ damage. Patient felt to have MGUS. He would now like to transfer care to our facility given his brother is also managed here. Neuropathy today described as a nuisance, and mild,not much progression over 3 years, remains active, runs over 6 miles a day. Past Medical History:   Diagnosis Date    Acute sinusitis     Arthritis     Bilateral impacted cerumen     followed by ENT- cerumen removed about 4 months    Chronic sinusitis     Conductive hearing loss     pt denies hearing loss    Esophageal reflux     pt denies    Hearing loss     pt denies    Hearing loss due to cerumen impaction     slight hearing loss at times with impaction    Itching of ear     Left knee pain          Past Surgical History:   Procedure Laterality Date    COLONOSCOPY      LUMBAR LAMINECTOMY      TONSILLECTOMY      WISDOM TOOTH EXTRACTION           Current Outpatient Medications   Medication Sig Dispense Refill    ascorbic acid (VITAMIN C) 500 MG tablet Take by mouth      aspirin 81 MG chewable tablet Take 1 tablet by mouth daily      cetirizine (ZYRTEC) 10 MG tablet 1 tablet      vitamin D 25 MCG (1000 UT) CAPS Take 1 capsule by mouth      cyanocobalamin 500 MCG tablet Take by mouth      GAMMAPLEX 5 GM/50ML SOLN solution       LUTEIN PO Take 1 tablet by mouth daily      rosuvastatin (CRESTOR) 10 MG tablet TAKE 1 TABLET (10 MG TOTAL) BY MOUTH IN THE MORNING. 1 Principal Discharge DX:	Acute viral syndrome

## 2023-08-10 LAB
KAPPA LC FREE SER-MCNC: 28.1 MG/L (ref 3.3–19.4)
KAPPA LC FREE/LAMBDA FREE SER: 2.63 (ref 0.26–1.65)
LAMBDA LC FREE SERPL-MCNC: 10.7 MG/L (ref 5.7–26.3)

## 2023-08-11 ENCOUNTER — HOSPITAL ENCOUNTER (OUTPATIENT)
Dept: LAB | Age: 73
Discharge: HOME | End: 2023-08-14
Payer: MEDICARE

## 2023-08-11 PROCEDURE — 84156 ASSAY OF PROTEIN URINE: CPT

## 2023-08-11 PROCEDURE — 84166 PROTEIN E-PHORESIS/URINE/CSF: CPT

## 2023-08-11 PROCEDURE — 86335 IMMUNFIX E-PHORSIS/URINE/CSF: CPT

## 2023-08-14 LAB
ALBUMIN 24H MFR UR ELPH: 0 %
ALBUMIN SERPL ELPH-MCNC: 3.9 G/DL (ref 2.9–4.4)
ALBUMIN/GLOB SERPL: 1.3 (ref 0.7–1.7)
ALPHA1 GLOB 24H MFR UR ELPH: 0 %
ALPHA1 GLOB SERPL ELPH-MCNC: 0.2 G/DL (ref 0–0.4)
ALPHA2 GLOB 24H MFR UR ELPH: 0 %
ALPHA2 GLOB SERPL ELPH-MCNC: 0.6 G/DL (ref 0.4–1)
B-GLOBULIN MFR UR ELPH: 0 %
B-GLOBULIN SERPL ELPH-MCNC: 0.8 G/DL (ref 0.7–1.3)
COLLECT DURATION TIME UR: 22 HR
GAMMA GLOB 24H MFR UR ELPH: 0 %
GAMMA GLOB SERPL ELPH-MCNC: 1.5 G/DL (ref 0.4–1.8)
GLOBULIN SER-MCNC: 3.1 G/DL (ref 2.2–3.9)
IGA SERPL-MCNC: 91 MG/DL (ref 61–437)
IGG SERPL-MCNC: 1801 MG/DL (ref 603–1613)
IGM SERPL-MCNC: 39 MG/DL (ref 15–143)
INTERPRETATION SERPL IEP-IMP: ABNORMAL
INTERPRETATION UR IFE-IMP: ABNORMAL
Lab: ABNORMAL
M PROTEIN 24H MFR UR ELPH: ABNORMAL %
M PROTEIN SERPL ELPH-MCNC: 0.3 G/DL
PROT 24H UR-MRATE: <128 MG/24 HR (ref 30–150)
PROT SERPL-MCNC: 7 G/DL (ref 6–8.5)
PROT UR-MCNC: <4 MG/DL
SPECIMEN VOL ?TM UR: 3200 ML

## 2023-09-11 ENCOUNTER — HOSPITAL ENCOUNTER (OUTPATIENT)
Dept: GENERAL RADIOLOGY | Age: 73
Discharge: HOME | End: 2023-09-14
Attending: INTERNAL MEDICINE
Payer: MEDICARE

## 2023-09-11 DIAGNOSIS — D47.2 MGUS (MONOCLONAL GAMMOPATHY OF UNKNOWN SIGNIFICANCE): ICD-10-CM

## 2023-09-11 PROCEDURE — 77075 RADEX OSSEOUS SURVEY COMPL: CPT

## 2023-09-20 ENCOUNTER — OFFICE VISIT (OUTPATIENT)
Dept: ONCOLOGY | Age: 73
End: 2023-09-20
Payer: MEDICARE

## 2023-09-20 VITALS
SYSTOLIC BLOOD PRESSURE: 121 MMHG | TEMPERATURE: 97.6 F | HEART RATE: 59 BPM | WEIGHT: 161.3 LBS | OXYGEN SATURATION: 100 % | HEIGHT: 70 IN | DIASTOLIC BLOOD PRESSURE: 70 MMHG | RESPIRATION RATE: 16 BRPM | BODY MASS INDEX: 23.09 KG/M2

## 2023-09-20 DIAGNOSIS — R79.89 ELEVATED BRAIN NATRIURETIC PEPTIDE (BNP) LEVEL: ICD-10-CM

## 2023-09-20 DIAGNOSIS — M89.9 BONE LESION: ICD-10-CM

## 2023-09-20 DIAGNOSIS — D47.2 MGUS (MONOCLONAL GAMMOPATHY OF UNKNOWN SIGNIFICANCE): Primary | ICD-10-CM

## 2023-09-20 PROCEDURE — G8428 CUR MEDS NOT DOCUMENT: HCPCS | Performed by: INTERNAL MEDICINE

## 2023-09-20 PROCEDURE — 3017F COLORECTAL CA SCREEN DOC REV: CPT | Performed by: INTERNAL MEDICINE

## 2023-09-20 PROCEDURE — G8420 CALC BMI NORM PARAMETERS: HCPCS | Performed by: INTERNAL MEDICINE

## 2023-09-20 PROCEDURE — 1123F ACP DISCUSS/DSCN MKR DOCD: CPT | Performed by: INTERNAL MEDICINE

## 2023-09-20 PROCEDURE — 1036F TOBACCO NON-USER: CPT | Performed by: INTERNAL MEDICINE

## 2023-09-20 PROCEDURE — 99214 OFFICE O/P EST MOD 30 MIN: CPT | Performed by: INTERNAL MEDICINE

## 2023-09-20 ASSESSMENT — PATIENT HEALTH QUESTIONNAIRE - PHQ9
SUM OF ALL RESPONSES TO PHQ QUESTIONS 1-9: 0
SUM OF ALL RESPONSES TO PHQ9 QUESTIONS 1 & 2: 0
1. LITTLE INTEREST OR PLEASURE IN DOING THINGS: 0
2. FEELING DOWN, DEPRESSED OR HOPELESS: 0
SUM OF ALL RESPONSES TO PHQ QUESTIONS 1-9: 0

## 2023-09-20 NOTE — PATIENT INSTRUCTIONS
Patient Instructions from Today's Visit    Reason for Visit:  Follow up    Plan:  The lab work  did showed some abnormal proteins in your blood. This is called monoclonal gammopathy of unknown significance (MGUS). Most of the time, this requires no treatment and does not mean anything. In a very small percentage of people with this condition, it can transform into a type of blood cancer called multiple myeloma. We will just need to monitor your labs a few times a year. The skeletal survey did show an area in your right pelvis so we will order an MRI just to be diligent. Your BNP was elevated so we will order a heart ultrasound just to make sure no evidence of amyloid. Follow Up: Follow up in 3-4 months    Recent Lab Results:       Treatment Summary has been discussed and given to patient: n/a        -------------------------------------------------------------------------------------------------------------------  Please call our office at [phone] if you have any  of the following symptoms:   Fever of 100.5 or greater  Chills  Shortness of breath  Swelling or pain in one leg    After office hours an answering service is available and will contact a provider for emergencies or if you are experiencing any of the above symptoms. Patient did express an interest in My Chart. My Chart log in information explained on the after visit summary printout at the [employer] desk.     Ananda Ricardo RN       [facility]: [address] [address]  Phone: [phone] Hours: Agatha ALEJANDRE [address] [facility] [address]: [address] [facility] , [address]  Phone: [phone] Hours: Agatha Castro [address]: [address] [facility]  Phone: [phone] Hours: Agatha Castro [address]: [address] [place] [address], [address]  Phone: [phone] Hours: Mon - Fri 730am - 430pm

## 2023-09-20 NOTE — PROGRESS NOTES
area, will get MRI though likely not of clinical significance. Re-assured. We will see him back in 3-4 months        IgG Kappa MGUS    PLAN:  - As above. Serial SPEP/LC.   - BNP borderline: 2D ECHO. - Skeletal survey: RT pubic ramus lucent area: MRI. RTC in 3-4 months w labs, SPEP, LC. Sooner if needed    Thank you Dr Gil Galindo for allowing us to paticipate in care of this pleasant patient.  Please call w/ any quesisiah Burgos MD  [facility]  Hematology Oncology  [address]  Office : [phone]  Fax : [phone]

## 2023-10-04 ENCOUNTER — HOSPITAL ENCOUNTER (OUTPATIENT)
Dept: MRI IMAGING | Age: 73
Discharge: HOME | End: 2023-10-07
Attending: INTERNAL MEDICINE
Payer: MEDICARE

## 2023-10-04 DIAGNOSIS — D47.2 MGUS (MONOCLONAL GAMMOPATHY OF UNKNOWN SIGNIFICANCE): ICD-10-CM

## 2023-10-04 DIAGNOSIS — M89.9 BONE LESION: ICD-10-CM

## 2023-10-04 PROCEDURE — 72197 MRI PELVIS W/O & W/DYE: CPT

## 2023-10-04 PROCEDURE — 6360000004 HC RX CONTRAST MEDICATION: Performed by: INTERNAL MEDICINE

## 2023-10-04 PROCEDURE — A9579 GAD-BASE MR CONTRAST NOS,1ML: HCPCS | Performed by: INTERNAL MEDICINE

## 2023-10-04 RX ADMIN — GADOTERIDOL 15 ML: 279.3 INJECTION, SOLUTION INTRAVENOUS at 12:24

## 2024-01-12 DIAGNOSIS — D47.2 MGUS (MONOCLONAL GAMMOPATHY OF UNKNOWN SIGNIFICANCE): ICD-10-CM

## 2024-01-12 LAB
ALBUMIN SERPL-MCNC: 3.9 G/DL (ref 3.2–4.6)
ALBUMIN/GLOB SERPL: 1.2 (ref 0.4–1.6)
ALP SERPL-CCNC: 101 U/L (ref 50–136)
ALT SERPL-CCNC: 59 U/L (ref 12–65)
ANION GAP SERPL CALC-SCNC: 1 MMOL/L (ref 2–11)
AST SERPL-CCNC: 56 U/L (ref 15–37)
BASOPHILS # BLD: 0 K/UL (ref 0–0.2)
BASOPHILS NFR BLD: 1 % (ref 0–2)
BILIRUB SERPL-MCNC: 0.3 MG/DL (ref 0.2–1.1)
BUN SERPL-MCNC: 18 MG/DL (ref 8–23)
CALCIUM SERPL-MCNC: 9.2 MG/DL (ref 8.3–10.4)
CHLORIDE SERPL-SCNC: 104 MMOL/L (ref 103–113)
CO2 SERPL-SCNC: 32 MMOL/L (ref 21–32)
CREAT SERPL-MCNC: 1 MG/DL (ref 0.8–1.5)
DIFFERENTIAL METHOD BLD: NORMAL
EOSINOPHIL # BLD: 0.1 K/UL (ref 0–0.8)
EOSINOPHIL NFR BLD: 1 % (ref 0.5–7.8)
ERYTHROCYTE [DISTWIDTH] IN BLOOD BY AUTOMATED COUNT: 12.4 % (ref 11.9–14.6)
GLOBULIN SER CALC-MCNC: 3.3 G/DL (ref 2.8–4.5)
GLUCOSE SERPL-MCNC: 95 MG/DL (ref 65–100)
HCT VFR BLD AUTO: 41.5 % (ref 41.1–50.3)
HGB BLD-MCNC: 13.8 G/DL (ref 13.6–17.2)
IMM GRANULOCYTES # BLD AUTO: 0 K/UL (ref 0–0.5)
IMM GRANULOCYTES NFR BLD AUTO: 0 % (ref 0–5)
LYMPHOCYTES # BLD: 2.1 K/UL (ref 0.5–4.6)
LYMPHOCYTES NFR BLD: 39 % (ref 13–44)
MCH RBC QN AUTO: 30.8 PG (ref 26.1–32.9)
MCHC RBC AUTO-ENTMCNC: 33.3 G/DL (ref 31.4–35)
MCV RBC AUTO: 92.6 FL (ref 82–102)
MONOCYTES # BLD: 0.4 K/UL (ref 0.1–1.3)
MONOCYTES NFR BLD: 7 % (ref 4–12)
NEUTS SEG # BLD: 2.8 K/UL (ref 1.7–8.2)
NEUTS SEG NFR BLD: 52 % (ref 43–78)
NRBC # BLD: 0 K/UL (ref 0–0.2)
PLATELET # BLD AUTO: 180 K/UL (ref 150–450)
PMV BLD AUTO: 12 FL (ref 9.4–12.3)
POTASSIUM SERPL-SCNC: 4.3 MMOL/L (ref 3.5–5.1)
PROT SERPL-MCNC: 7.2 G/DL (ref 6.3–8.2)
RBC # BLD AUTO: 4.48 M/UL (ref 4.23–5.6)
SODIUM SERPL-SCNC: 137 MMOL/L (ref 136–146)
WBC # BLD AUTO: 5.4 K/UL (ref 4.3–11.1)

## 2024-01-15 LAB
KAPPA LC FREE SER-MCNC: 32.9 MG/L (ref 3.3–19.4)
KAPPA LC FREE/LAMBDA FREE SER: 3.26 (ref 0.26–1.65)
LAMBDA LC FREE SERPL-MCNC: 10.1 MG/L (ref 5.7–26.3)

## 2024-01-18 ENCOUNTER — OFFICE VISIT (OUTPATIENT)
Dept: ONCOLOGY | Age: 74
End: 2024-01-18
Payer: MEDICARE

## 2024-01-18 ENCOUNTER — INITIAL CONSULT (OUTPATIENT)
Age: 74
End: 2024-01-18

## 2024-01-18 VITALS
RESPIRATION RATE: 16 BRPM | WEIGHT: 159.3 LBS | SYSTOLIC BLOOD PRESSURE: 131 MMHG | DIASTOLIC BLOOD PRESSURE: 75 MMHG | HEIGHT: 70 IN | HEART RATE: 64 BPM | BODY MASS INDEX: 22.81 KG/M2 | TEMPERATURE: 98 F | OXYGEN SATURATION: 100 %

## 2024-01-18 VITALS
DIASTOLIC BLOOD PRESSURE: 86 MMHG | HEART RATE: 52 BPM | BODY MASS INDEX: 22.94 KG/M2 | WEIGHT: 160.2 LBS | HEIGHT: 70 IN | SYSTOLIC BLOOD PRESSURE: 134 MMHG

## 2024-01-18 DIAGNOSIS — I51.7 ATRIAL ENLARGEMENT, RIGHT: Primary | ICD-10-CM

## 2024-01-18 DIAGNOSIS — I51.7 ATRIAL ENLARGEMENT, RIGHT: ICD-10-CM

## 2024-01-18 DIAGNOSIS — E78.5 DYSLIPIDEMIA: ICD-10-CM

## 2024-01-18 DIAGNOSIS — I25.10 CORONARY ARTERY DISEASE INVOLVING NATIVE CORONARY ARTERY OF NATIVE HEART WITHOUT ANGINA PECTORIS: ICD-10-CM

## 2024-01-18 DIAGNOSIS — D47.2 MGUS (MONOCLONAL GAMMOPATHY OF UNKNOWN SIGNIFICANCE): ICD-10-CM

## 2024-01-18 DIAGNOSIS — M89.9 BONE LESION: ICD-10-CM

## 2024-01-18 DIAGNOSIS — E78.00 PURE HYPERCHOLESTEROLEMIA: ICD-10-CM

## 2024-01-18 DIAGNOSIS — D47.2 MGUS (MONOCLONAL GAMMOPATHY OF UNKNOWN SIGNIFICANCE): Primary | ICD-10-CM

## 2024-01-18 LAB
ALBUMIN SERPL ELPH-MCNC: 3.8 G/DL (ref 2.9–4.4)
ALBUMIN/GLOB SERPL: 1.2 (ref 0.7–1.7)
ALPHA1 GLOB SERPL ELPH-MCNC: 0.3 G/DL (ref 0–0.4)
ALPHA2 GLOB SERPL ELPH-MCNC: 0.7 G/DL (ref 0.4–1)
B-GLOBULIN SERPL ELPH-MCNC: 0.9 G/DL (ref 0.7–1.3)
GAMMA GLOB SERPL ELPH-MCNC: 1.4 G/DL (ref 0.4–1.8)
GLOBULIN SER-MCNC: 3.3 G/DL (ref 2.2–3.9)
IGA SERPL-MCNC: 91 MG/DL (ref 61–437)
IGG SERPL-MCNC: 1456 MG/DL (ref 603–1613)
IGM SERPL-MCNC: 37 MG/DL (ref 15–143)
INTERPRETATION SERPL IEP-IMP: ABNORMAL
M PROTEIN SERPL ELPH-MCNC: 0.5 G/DL
PROT SERPL-MCNC: 7.1 G/DL (ref 6–8.5)

## 2024-01-18 PROCEDURE — 3017F COLORECTAL CA SCREEN DOC REV: CPT | Performed by: INTERNAL MEDICINE

## 2024-01-18 PROCEDURE — 1036F TOBACCO NON-USER: CPT | Performed by: INTERNAL MEDICINE

## 2024-01-18 PROCEDURE — 99214 OFFICE O/P EST MOD 30 MIN: CPT | Performed by: INTERNAL MEDICINE

## 2024-01-18 PROCEDURE — G8484 FLU IMMUNIZE NO ADMIN: HCPCS | Performed by: INTERNAL MEDICINE

## 2024-01-18 PROCEDURE — G8428 CUR MEDS NOT DOCUMENT: HCPCS | Performed by: INTERNAL MEDICINE

## 2024-01-18 PROCEDURE — G8420 CALC BMI NORM PARAMETERS: HCPCS | Performed by: INTERNAL MEDICINE

## 2024-01-18 PROCEDURE — 1123F ACP DISCUSS/DSCN MKR DOCD: CPT | Performed by: INTERNAL MEDICINE

## 2024-01-18 NOTE — PROGRESS NOTES
[address] [facility], [place] [address]  PHONE: [phone]        24    NAME:  Jose Chester  : 1950  MRN: [id number]       SUBJECTIVE:   Jose Chester is a 73 y.o. male seen for a consultation visit regarding the following:     Chief Complaint   Patient presents with    Consultation     MGUS    Hyperlipidemia          HPI:  Consultation is requested by Jose Sanchez MD for evaluation of Consultation (MGUS) and Hyperlipidemia  Echo 2023: normal EF, R atrial enlargement  Cad:  CA score ~100 in the past.  In .      Being seen and treated for MGUS.   Seeing heme/onc, being followed.   He has run 42 marathons, triathlons and very active.  Ran [place] in 5 different decades.   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  Not running now, but walking hills and feeling well.  No angina, CP.  No new LEOS, SOB.  NO new palp, no known tachycardia in the past.   No CP.  NO pressure.   Patient denies recent history of orthopnea, PND, excessive dizziness and/or syncope.      Father had MI at 68yo.         Past Medical History, Past Surgical History, Family history, Social History, and Medications were all reviewed with the patient today and updated as necessary.       Current Outpatient Medications   Medication Sig Dispense Refill    ALPHA LIPOIC ACID PO Take by mouth      Multiple Vitamin (MULTIVITAMIN ADULT PO) Take by mouth      ascorbic acid (VITAMIN C) 500 MG tablet Take by mouth      aspirin 81 MG chewable tablet Take 1 tablet by mouth daily      cetirizine (ZYRTEC) 10 MG tablet 1 tablet      vitamin D 25 MCG (1000 UT) CAPS Take 1 capsule by mouth      cyanocobalamin 500 MCG tablet Take by mouth      rosuvastatin (CRESTOR) 10 MG tablet TAKE 1 TABLET (10 MG TOTAL) BY MOUTH IN THE MORNING.      tadalafil (CIALIS) 5 MG tablet Take 1 tablet by mouth daily as needed      GAMMAPLEX 5 GM/50ML SOLN solution  (Patient not taking: Reported on 2024)      LUTEIN

## 2024-01-18 NOTE — PATIENT INSTRUCTIONS
Patient Instructions from Today's Visit    Reason for Visit:  Follow up    Plan:  Free light chains look stable. SPEP is still pending, we will let you know if anything concerning.    We will refer you to the cardiologist for right atrial enlargement    Follow Up:  Follow up in 6 months with labs one week prior     Recent Lab Results:  No visits with results within 3 Day(s) from this visit.   Latest known visit with results is:   Orders Only on 01/12/2024   Component Date Value Ref Range Status    Free Kappa Light Chains 01/12/2024 32.9 (H)  3.3 - 19.4 mg/L Final    Free Lambda Light Chains 01/12/2024 10.1  5.7 - 26.3 mg/L Final    K/L Ratio 01/12/2024 3.26 (H)  0.26 - 1.65   Final    Comment: (NOTE)  Performed At:  [facility] [id number]  Nadir Santiago MD Ph:[phone]      WBC 01/12/2024 5.4  4.3 - 11.1 K/uL Final    RBC 01/12/2024 4.48  4.23 - 5.6 M/uL Final    Hemoglobin 01/12/2024 13.8  13.6 - 17.2 g/dL Final    Hematocrit 01/12/2024 41.5  41.1 - 50.3 % Final    MCV 01/12/2024 92.6  82 - 102 FL Final    MCH 01/12/2024 30.8  26.1 - 32.9 PG Final    MCHC 01/12/2024 33.3  31.4 - 35.0 g/dL Final    RDW 01/12/2024 12.4  11.9 - 14.6 % Final    Platelets 01/12/2024 180  150 - 450 K/uL Final    MPV 01/12/2024 12.0  9.4 - 12.3 FL Final    nRBC 01/12/2024 0.00  0.0 - 0.2 K/uL Final    **Note: Absolute NRBC parameter is now reported with Hemogram**    Differential Type 01/12/2024 AUTOMATED    Final    Neutrophils % 01/12/2024 52  43 - 78 % Final    Lymphocytes % 01/12/2024 39  13 - 44 % Final    Monocytes % 01/12/2024 7  4.0 - 12.0 % Final    Eosinophils % 01/12/2024 1  0.5 - 7.8 % Final    Basophils % 01/12/2024 1  0.0 - 2.0 % Final    Immature Granulocytes 01/12/2024 0  0.0 - 5.0 % Final    Neutrophils Absolute 01/12/2024 2.8  1.7 - 8.2 K/UL Final    Lymphocytes Absolute 01/12/2024 2.1  0.5 - 4.6 K/UL Final    Monocytes Absolute 01/12/2024 0.4  0.1 - 1.3 K/UL Final

## 2024-07-05 ENCOUNTER — HOSPITAL ENCOUNTER (OUTPATIENT)
Dept: LAB | Age: 74
End: 2024-07-05
Payer: MEDICARE

## 2024-07-05 DIAGNOSIS — D47.2 MGUS (MONOCLONAL GAMMOPATHY OF UNKNOWN SIGNIFICANCE): ICD-10-CM

## 2024-07-05 LAB
ALBUMIN SERPL-MCNC: 3.9 G/DL (ref 3.2–4.6)
ALBUMIN/GLOB SERPL: 1.3 (ref 1–1.9)
ALP SERPL-CCNC: 102 U/L (ref 40–129)
ALT SERPL-CCNC: 73 U/L (ref 12–65)
ANION GAP SERPL CALC-SCNC: 8 MMOL/L (ref 9–18)
AST SERPL-CCNC: 79 U/L (ref 15–37)
BASOPHILS # BLD: 0.1 K/UL (ref 0–0.2)
BASOPHILS NFR BLD: 1 % (ref 0–2)
BILIRUB SERPL-MCNC: 0.7 MG/DL (ref 0–1.2)
BUN SERPL-MCNC: 15 MG/DL (ref 8–23)
CALCIUM SERPL-MCNC: 9.5 MG/DL (ref 8.8–10.2)
CHLORIDE SERPL-SCNC: 96 MMOL/L (ref 98–107)
CO2 SERPL-SCNC: 32 MMOL/L (ref 20–28)
CREAT SERPL-MCNC: 1.1 MG/DL (ref 0.8–1.3)
DIFFERENTIAL METHOD BLD: ABNORMAL
EOSINOPHIL # BLD: 0.3 K/UL (ref 0–0.8)
EOSINOPHIL NFR BLD: 3 % (ref 0.5–7.8)
ERYTHROCYTE [DISTWIDTH] IN BLOOD BY AUTOMATED COUNT: 12.2 % (ref 11.9–14.6)
GLOBULIN SER CALC-MCNC: 3 G/DL (ref 2.3–3.5)
GLUCOSE SERPL-MCNC: 79 MG/DL (ref 70–99)
HCT VFR BLD AUTO: 42.6 % (ref 41.1–50.3)
HGB BLD-MCNC: 14.2 G/DL (ref 13.6–17.2)
IMM GRANULOCYTES # BLD AUTO: 0 K/UL (ref 0–0.5)
IMM GRANULOCYTES NFR BLD AUTO: 0 % (ref 0–5)
KAPPA LC FREE SER-MCNC: 29.2 MG/L (ref 2.4–20.7)
KAPPA LC FREE/LAMBDA FREE SER: 1.8 (ref 0.2–0.8)
LAMBDA LC FREE SERPL-MCNC: 16.1 MG/L (ref 4.2–27.7)
LYMPHOCYTES # BLD: 1.8 K/UL (ref 0.5–4.6)
LYMPHOCYTES NFR BLD: 21 % (ref 13–44)
MCH RBC QN AUTO: 30.6 PG (ref 26.1–32.9)
MCHC RBC AUTO-ENTMCNC: 33.3 G/DL (ref 31.4–35)
MCV RBC AUTO: 91.8 FL (ref 82–102)
MONOCYTES # BLD: 1.1 K/UL (ref 0.1–1.3)
MONOCYTES NFR BLD: 13 % (ref 4–12)
NEUTS SEG # BLD: 5.5 K/UL (ref 1.7–8.2)
NEUTS SEG NFR BLD: 62 % (ref 43–78)
NRBC # BLD: 0 K/UL (ref 0–0.2)
PLATELET # BLD AUTO: 125 K/UL (ref 150–450)
PLATELET COMMENT: SLIGHT
PMV BLD AUTO: 11.5 FL (ref 9.4–12.3)
POTASSIUM SERPL-SCNC: 3.6 MMOL/L (ref 3.5–5.1)
PROT SERPL-MCNC: 6.8 G/DL (ref 6.3–8.2)
RBC # BLD AUTO: 4.64 M/UL (ref 4.23–5.6)
RBC MORPH BLD: ABNORMAL
SODIUM SERPL-SCNC: 136 MMOL/L (ref 136–145)
WBC # BLD AUTO: 8.8 K/UL (ref 4.3–11.1)
WBC MORPH BLD: ABNORMAL

## 2024-07-05 PROCEDURE — 85025 COMPLETE CBC W/AUTO DIFF WBC: CPT

## 2024-07-05 PROCEDURE — 83521 IG LIGHT CHAINS FREE EACH: CPT

## 2024-07-05 PROCEDURE — 86334 IMMUNOFIX E-PHORESIS SERUM: CPT

## 2024-07-05 PROCEDURE — 84165 PROTEIN E-PHORESIS SERUM: CPT

## 2024-07-05 PROCEDURE — 80053 COMPREHEN METABOLIC PANEL: CPT

## 2024-07-05 PROCEDURE — 82784 ASSAY IGA/IGD/IGG/IGM EACH: CPT

## 2024-07-10 LAB
ALBUMIN SERPL ELPH-MCNC: 3.6 G/DL (ref 2.9–4.4)
ALBUMIN/GLOB SERPL: 1.4 (ref 0.7–1.7)
ALPHA1 GLOB SERPL ELPH-MCNC: 0.3 G/DL (ref 0–0.4)
ALPHA2 GLOB SERPL ELPH-MCNC: 0.7 G/DL (ref 0.4–1)
B-GLOBULIN SERPL ELPH-MCNC: 0.9 G/DL (ref 0.7–1.3)
GAMMA GLOB SERPL ELPH-MCNC: 0.8 G/DL (ref 0.4–1.8)
GLOBULIN SER-MCNC: 2.7 G/DL (ref 2.2–3.9)
IGA SERPL-MCNC: 76 MG/DL (ref 61–437)
IGG SERPL-MCNC: 983 MG/DL (ref 603–1613)
IGM SERPL-MCNC: 25 MG/DL (ref 15–143)
INTERPRETATION SERPL IEP-IMP: ABNORMAL
M PROTEIN SERPL ELPH-MCNC: 0.4 G/DL
PROT SERPL-MCNC: 6.3 G/DL (ref 6–8.5)

## 2024-07-15 DIAGNOSIS — D47.2 MGUS (MONOCLONAL GAMMOPATHY OF UNKNOWN SIGNIFICANCE): Primary | ICD-10-CM

## 2024-07-16 ENCOUNTER — OFFICE VISIT (OUTPATIENT)
Dept: ONCOLOGY | Age: 74
End: 2024-07-16
Payer: MEDICARE

## 2024-07-16 ENCOUNTER — HOSPITAL ENCOUNTER (OUTPATIENT)
Dept: LAB | Age: 74
Discharge: HOME | End: 2024-07-19
Payer: MEDICARE

## 2024-07-16 VITALS
BODY MASS INDEX: 22.54 KG/M2 | SYSTOLIC BLOOD PRESSURE: 100 MMHG | TEMPERATURE: 98.1 F | WEIGHT: 157.4 LBS | DIASTOLIC BLOOD PRESSURE: 61 MMHG | HEART RATE: 63 BPM | RESPIRATION RATE: 16 BRPM | HEIGHT: 70 IN | OXYGEN SATURATION: 98 %

## 2024-07-16 DIAGNOSIS — E55.9 VITAMIN D DEFICIENCY: ICD-10-CM

## 2024-07-16 DIAGNOSIS — D47.2 MGUS (MONOCLONAL GAMMOPATHY OF UNKNOWN SIGNIFICANCE): Primary | ICD-10-CM

## 2024-07-16 DIAGNOSIS — E78.49 OTHER HYPERLIPIDEMIA: ICD-10-CM

## 2024-07-16 DIAGNOSIS — D47.2 MGUS (MONOCLONAL GAMMOPATHY OF UNKNOWN SIGNIFICANCE): ICD-10-CM

## 2024-07-16 LAB
25(OH)D3 SERPL-MCNC: 36.1 NG/ML (ref 30–100)
ALBUMIN SERPL-MCNC: 3.5 G/DL (ref 3.2–4.6)
ALBUMIN/GLOB SERPL: 1.2 (ref 1–1.9)
ALP SERPL-CCNC: 106 U/L (ref 40–129)
ALT SERPL-CCNC: 61 U/L (ref 12–65)
ANION GAP SERPL CALC-SCNC: 9 MMOL/L (ref 9–18)
AST SERPL-CCNC: 57 U/L (ref 15–37)
BASOPHILS # BLD: 0 K/UL (ref 0–0.2)
BASOPHILS NFR BLD: 0 % (ref 0–2)
BILIRUB SERPL-MCNC: 0.3 MG/DL (ref 0–1.2)
BUN SERPL-MCNC: 18 MG/DL (ref 8–23)
CALCIUM SERPL-MCNC: 9.1 MG/DL (ref 8.8–10.2)
CHLORIDE SERPL-SCNC: 104 MMOL/L (ref 98–107)
CO2 SERPL-SCNC: 29 MMOL/L (ref 20–28)
CREAT SERPL-MCNC: 1.02 MG/DL (ref 0.8–1.3)
DIFFERENTIAL METHOD BLD: ABNORMAL
EOSINOPHIL # BLD: 0.1 K/UL (ref 0–0.8)
EOSINOPHIL NFR BLD: 1 % (ref 0.5–7.8)
ERYTHROCYTE [DISTWIDTH] IN BLOOD BY AUTOMATED COUNT: 12.3 % (ref 11.9–14.6)
GLOBULIN SER CALC-MCNC: 2.9 G/DL (ref 2.3–3.5)
GLUCOSE SERPL-MCNC: 105 MG/DL (ref 70–99)
HCT VFR BLD AUTO: 40.1 % (ref 41.1–50.3)
HGB BLD-MCNC: 12.8 G/DL (ref 13.6–17.2)
IMM GRANULOCYTES # BLD AUTO: 0 K/UL (ref 0–0.5)
IMM GRANULOCYTES NFR BLD AUTO: 0 % (ref 0–5)
KAPPA LC FREE SER-MCNC: 32.8 MG/L (ref 2.4–20.7)
KAPPA LC FREE/LAMBDA FREE SER: 2.3 (ref 0.2–0.8)
LAMBDA LC FREE SERPL-MCNC: 14.2 MG/L (ref 4.2–27.7)
LYMPHOCYTES # BLD: 2.6 K/UL (ref 0.5–4.6)
LYMPHOCYTES NFR BLD: 34 % (ref 13–44)
MCH RBC QN AUTO: 30.1 PG (ref 26.1–32.9)
MCHC RBC AUTO-ENTMCNC: 31.9 G/DL (ref 31.4–35)
MCV RBC AUTO: 94.4 FL (ref 82–102)
MONOCYTES # BLD: 0.8 K/UL (ref 0.1–1.3)
MONOCYTES NFR BLD: 11 % (ref 4–12)
NEUTS SEG # BLD: 4.2 K/UL (ref 1.7–8.2)
NEUTS SEG NFR BLD: 54 % (ref 43–78)
NRBC # BLD: 0 K/UL (ref 0–0.2)
PLATELET # BLD AUTO: 309 K/UL (ref 150–450)
PLATELET COMMENT: ADEQUATE
PMV BLD AUTO: 10 FL (ref 9.4–12.3)
POTASSIUM SERPL-SCNC: 4.8 MMOL/L (ref 3.5–5.1)
PROT SERPL-MCNC: 6.4 G/DL (ref 6.3–8.2)
RBC # BLD AUTO: 4.25 M/UL (ref 4.23–5.6)
RBC MORPH BLD: ABNORMAL
SODIUM SERPL-SCNC: 142 MMOL/L (ref 136–145)
VIT B12 SERPL-MCNC: 1825 PG/ML (ref 193–986)
WBC # BLD AUTO: 7.7 K/UL (ref 4.3–11.1)
WBC MORPH BLD: ABNORMAL

## 2024-07-16 PROCEDURE — 82784 ASSAY IGA/IGD/IGG/IGM EACH: CPT

## 2024-07-16 PROCEDURE — 80053 COMPREHEN METABOLIC PANEL: CPT

## 2024-07-16 PROCEDURE — 83521 IG LIGHT CHAINS FREE EACH: CPT

## 2024-07-16 PROCEDURE — G8427 DOCREV CUR MEDS BY ELIG CLIN: HCPCS | Performed by: INTERNAL MEDICINE

## 2024-07-16 PROCEDURE — 82232 ASSAY OF BETA-2 PROTEIN: CPT

## 2024-07-16 PROCEDURE — 3017F COLORECTAL CA SCREEN DOC REV: CPT | Performed by: INTERNAL MEDICINE

## 2024-07-16 PROCEDURE — 82607 VITAMIN B-12: CPT

## 2024-07-16 PROCEDURE — 36415 COLL VENOUS BLD VENIPUNCTURE: CPT

## 2024-07-16 PROCEDURE — 1036F TOBACCO NON-USER: CPT | Performed by: INTERNAL MEDICINE

## 2024-07-16 PROCEDURE — 1123F ACP DISCUSS/DSCN MKR DOCD: CPT | Performed by: INTERNAL MEDICINE

## 2024-07-16 PROCEDURE — G8420 CALC BMI NORM PARAMETERS: HCPCS | Performed by: INTERNAL MEDICINE

## 2024-07-16 PROCEDURE — 84165 PROTEIN E-PHORESIS SERUM: CPT

## 2024-07-16 PROCEDURE — 82306 VITAMIN D 25 HYDROXY: CPT

## 2024-07-16 PROCEDURE — 99215 OFFICE O/P EST HI 40 MIN: CPT | Performed by: INTERNAL MEDICINE

## 2024-07-16 PROCEDURE — 86334 IMMUNOFIX E-PHORESIS SERUM: CPT

## 2024-07-16 PROCEDURE — 85025 COMPLETE CBC W/AUTO DIFF WBC: CPT

## 2024-07-16 NOTE — PROGRESS NOTES
[place] [id number]  Phone: [phone]        7/16/2024  Jose Chester  1950  [id number]      Jose Chester is a 73 year old  man who has returned to my clinic for a follow-up visit; he was previously a patient of Dr. Carolyn Cervantes. In 5/2023 he was found to have MGUS, IgG Kappa ( intermediate-risk group ).      ALLERGIES:    No known drug allergies.      FAMILY HISTORY:    His brother has Multiple Myeloma.      SOCIAL HISTORY:    He is  and lives with his wife. He is a retired . He denies ever using any tobacco products.      PAST MEDICAL HISTORY:     Hyperlipidemia, Osteoarthritis, BPH, Coronary Artery Disease, Neuropathy and MGUS.      ROS:  The patient complained of fatigue and parasthesia; all other systems negative.       PHYSICAL EXAM:   The patient was alert, awake and oriented, no acute distress was noted. Oral examination did not reveal any mucosal lesions. Lymph node examination did not reveal any adenopathy. Neck examination revealed a supple neck, no thyromegaly or masses were noted. Chest examination revealed normal vesicular breath sounds. Heart examination revealed S-1 and S-2 with a 2/6 systolic murmur. Abdominal examination revealed a non-tender abdomen, bowel sounds were positive, no organomegaly could be appreciated. Examination of the extremities did not reveal any tenderness or erythema. Examination of the skin did not reveal any lesions.      KPS:   90.      LABORATORY INVESTIGATIONS:  CBC showed a WBC count of 7.7, ANC was 4.2, Hemoglobin was 12.8 and Platelets were 309. Medical problems and test results were reviewed with the patient today.      ASSESSMENT:    MGUS, IgG Kappa; Hyperlipidemia. I spent a total of 42 minutes on the day of the visit, managing the care of this patient.      PLAN:  He should continue taking Rosuvastatin; at his next clinic visit I will re-check his CBC, CMP,

## 2024-07-16 NOTE — PATIENT INSTRUCTIONS
Patient Information from Today's Visit    The members of your [facility] are listed below:    Physician Provider: Mauricio Anna Medical Oncologist  Advanced Practice Clinician: Raissa Meyers NP  Registered Nurse: N/A  Navigator: N/A  Medical Assistant: Lisa ROWLEY MA and Paz WALTER MA  : Elena ROWLEY   Supportive Care Services: Jennie CAIN LMSW    Diagnosis:   MGUS      Follow Up Instructions:   You will have some blood work done today  We will bring you back in January 2025 for a follow up with  and lab work prior.      Treatment Summary has been discussed and given to patient:N/A      Current Labs:   Lab work done today downstairs            Please refer to After Visit Summary or [facility] for upcoming appointment information. Please call our office for rescheduling needs at least 24 hours before your scheduled appointment time.If you have any questions regarding your upcoming schedule please reach out to your care team through [employer] or call [phone].     Please notify your assigned Nurse Navigator of any unplanned hospital admissions or Emergency Room visits within 24 hours of discharge.    -------------------------------------------------------------------------------------------------------------------  Please call our office at [phone] if you have any  of the following symptoms:   Fever of 100.5 or greater  Chills  Shortness of breath  Swelling or pain in one leg    After office hours an answering service is available and will contact a provider for emergencies or if you are experiencing any of the above symptoms.        Lisa Luz MA

## 2024-07-18 LAB — B2 MICROGLOB SERPL-MCNC: 2.3 MG/L (ref 0.6–2.4)

## 2024-07-21 LAB
ALBUMIN SERPL ELPH-MCNC: 3.3 G/DL (ref 2.9–4.4)
ALBUMIN/GLOB SERPL: 1.3 (ref 0.7–1.7)
ALPHA1 GLOB SERPL ELPH-MCNC: 0.3 G/DL (ref 0–0.4)
ALPHA2 GLOB SERPL ELPH-MCNC: 0.7 G/DL (ref 0.4–1)
B-GLOBULIN SERPL ELPH-MCNC: 0.9 G/DL (ref 0.7–1.3)
GAMMA GLOB SERPL ELPH-MCNC: 0.8 G/DL (ref 0.4–1.8)
GLOBULIN SER-MCNC: 2.7 G/DL (ref 2.2–3.9)
IGA SERPL-MCNC: 85 MG/DL (ref 61–437)
IGG SERPL-MCNC: 946 MG/DL (ref 603–1613)
IGM SERPL-MCNC: 30 MG/DL (ref 15–143)
INTERPRETATION SERPL IEP-IMP: ABNORMAL
M PROTEIN SERPL ELPH-MCNC: 0.5 G/DL
PROT SERPL-MCNC: 6 G/DL (ref 6–8.5)

## 2024-07-29 ENCOUNTER — OFFICE VISIT (OUTPATIENT)
Age: 74
End: 2024-07-29
Payer: MEDICARE

## 2024-07-29 VITALS
SYSTOLIC BLOOD PRESSURE: 132 MMHG | WEIGHT: 159 LBS | BODY MASS INDEX: 22.76 KG/M2 | HEART RATE: 66 BPM | DIASTOLIC BLOOD PRESSURE: 78 MMHG | HEIGHT: 70 IN

## 2024-07-29 DIAGNOSIS — I51.7 ATRIAL ENLARGEMENT, RIGHT: ICD-10-CM

## 2024-07-29 DIAGNOSIS — I25.10 CORONARY ARTERY DISEASE INVOLVING NATIVE CORONARY ARTERY OF NATIVE HEART WITHOUT ANGINA PECTORIS: Primary | ICD-10-CM

## 2024-07-29 DIAGNOSIS — E78.00 PURE HYPERCHOLESTEROLEMIA: ICD-10-CM

## 2024-07-29 DIAGNOSIS — E78.5 DYSLIPIDEMIA: ICD-10-CM

## 2024-07-29 PROCEDURE — 1036F TOBACCO NON-USER: CPT | Performed by: INTERNAL MEDICINE

## 2024-07-29 PROCEDURE — G8420 CALC BMI NORM PARAMETERS: HCPCS | Performed by: INTERNAL MEDICINE

## 2024-07-29 PROCEDURE — 99214 OFFICE O/P EST MOD 30 MIN: CPT | Performed by: INTERNAL MEDICINE

## 2024-07-29 PROCEDURE — 3017F COLORECTAL CA SCREEN DOC REV: CPT | Performed by: INTERNAL MEDICINE

## 2024-07-29 PROCEDURE — G8428 CUR MEDS NOT DOCUMENT: HCPCS | Performed by: INTERNAL MEDICINE

## 2024-07-29 PROCEDURE — 1123F ACP DISCUSS/DSCN MKR DOCD: CPT | Performed by: INTERNAL MEDICINE

## 2024-07-29 NOTE — PROGRESS NOTES
[address] [facility], [place] [address]  PHONE: [phone]     24    NAME:  Jose Chester  : 1950  MRN: [id number]       SUBJECTIVE:   Jose Chester is a 73 y.o. male seen for a follow up visit regarding the following:     Chief Complaint   Patient presents with    Coronary Artery Disease    Follow-up       HPI:   Echo 2023: normal EF, R atrial enlargement  Cad:  CA score ~100 in the past.  In .    Echo 2024: normal EF, mild TR, normal RA and RV, normal LA.      Been dealing with sinus infection since trip to [place] this summer.    Being seen and treated for MGUS.   Seeing heme/onc, being followed.   He has run 42 marathons, triathlons and very active.  Ran [place] in 5 different decades.   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  Not running now, but walking hills and feeling well.  No angina, CP.  No new LEOS, SOB.  NO new palp, no known tachycardia in the past.   No CP.  NO pressure.   Patient denies recent history of orthopnea, PND, excessive dizziness and/or syncope.        Father had MI at 66yo.         Past Medical History, Past Surgical History, Family history, Social History, and Medications were all reviewed with the patient today and updated as necessary.     Current Outpatient Medications   Medication Sig Dispense Refill    ALPHA LIPOIC ACID PO Take by mouth      ascorbic acid (VITAMIN C) 500 MG tablet Take by mouth      aspirin 81 MG chewable tablet Take 1 tablet by mouth daily      cetirizine (ZYRTEC) 10 MG tablet 1 tablet      GAMMAPLEX 5 GM/50ML SOLN solution       LUTEIN PO Take 1 tablet by mouth daily      rosuvastatin (CRESTOR) 10 MG tablet TAKE 1 TABLET (10 MG TOTAL) BY MOUTH IN THE MORNING.      tadalafil (CIALIS) 5 MG tablet Take 1 tablet by mouth daily as needed      Multiple Vitamin (MULTIVITAMIN ADULT PO) Take by mouth (Patient not taking: Reported on 2024)      vitamin D 25 MCG (1000 UT) CAPS Take 1 capsule by mouth

## 2024-08-14 DIAGNOSIS — D47.2 MGUS (MONOCLONAL GAMMOPATHY OF UNKNOWN SIGNIFICANCE): Primary | ICD-10-CM

## 2024-08-19 ENCOUNTER — HOSPITAL ENCOUNTER (OUTPATIENT)
Dept: LAB | Age: 74
Discharge: HOME | End: 2024-08-22
Payer: MEDICARE

## 2024-08-19 ENCOUNTER — OFFICE VISIT (OUTPATIENT)
Dept: ONCOLOGY | Age: 74
End: 2024-08-19
Payer: MEDICARE

## 2024-08-19 VITALS
HEART RATE: 53 BPM | WEIGHT: 158.1 LBS | OXYGEN SATURATION: 98 % | BODY MASS INDEX: 22.63 KG/M2 | RESPIRATION RATE: 16 BRPM | TEMPERATURE: 97.5 F | DIASTOLIC BLOOD PRESSURE: 76 MMHG | HEIGHT: 70 IN | SYSTOLIC BLOOD PRESSURE: 128 MMHG

## 2024-08-19 DIAGNOSIS — D47.2 MGUS (MONOCLONAL GAMMOPATHY OF UNKNOWN SIGNIFICANCE): Primary | ICD-10-CM

## 2024-08-19 DIAGNOSIS — E78.49 OTHER HYPERLIPIDEMIA: ICD-10-CM

## 2024-08-19 DIAGNOSIS — E53.8 DISORDER OF VITAMIN B12: ICD-10-CM

## 2024-08-19 DIAGNOSIS — D47.2 MGUS (MONOCLONAL GAMMOPATHY OF UNKNOWN SIGNIFICANCE): ICD-10-CM

## 2024-08-19 LAB
ALBUMIN SERPL-MCNC: 4.2 G/DL (ref 3.2–4.6)
ALBUMIN/GLOB SERPL: 1.7 (ref 1–1.9)
ALP SERPL-CCNC: 77 U/L (ref 40–129)
ALT SERPL-CCNC: 38 U/L (ref 12–65)
ANION GAP SERPL CALC-SCNC: 10 MMOL/L (ref 9–18)
AST SERPL-CCNC: 56 U/L (ref 15–37)
BASOPHILS # BLD: 0.1 K/UL (ref 0–0.2)
BASOPHILS NFR BLD: 1 % (ref 0–2)
BILIRUB SERPL-MCNC: 0.8 MG/DL (ref 0–1.2)
BUN SERPL-MCNC: 13 MG/DL (ref 8–23)
CALCIUM SERPL-MCNC: 9.6 MG/DL (ref 8.8–10.2)
CHLORIDE SERPL-SCNC: 98 MMOL/L (ref 98–107)
CO2 SERPL-SCNC: 31 MMOL/L (ref 20–28)
CREAT SERPL-MCNC: 1.12 MG/DL (ref 0.8–1.3)
DIFFERENTIAL METHOD BLD: ABNORMAL
EOSINOPHIL # BLD: 0.1 K/UL (ref 0–0.8)
EOSINOPHIL NFR BLD: 2 % (ref 0.5–7.8)
ERYTHROCYTE [DISTWIDTH] IN BLOOD BY AUTOMATED COUNT: 12.9 % (ref 11.9–14.6)
GLOBULIN SER CALC-MCNC: 2.5 G/DL (ref 2.3–3.5)
GLUCOSE SERPL-MCNC: 96 MG/DL (ref 70–99)
HCT VFR BLD AUTO: 42.3 % (ref 41.1–50.3)
HGB BLD-MCNC: 14.1 G/DL (ref 13.6–17.2)
IMM GRANULOCYTES # BLD AUTO: 0 K/UL (ref 0–0.5)
IMM GRANULOCYTES NFR BLD AUTO: 0 % (ref 0–5)
KAPPA LC FREE SER-MCNC: 29.9 MG/L (ref 2.4–20.7)
KAPPA LC FREE/LAMBDA FREE SER: 2.8 (ref 0.2–0.8)
LAMBDA LC FREE SERPL-MCNC: 10.5 MG/L (ref 4.2–27.7)
LYMPHOCYTES # BLD: 3 K/UL (ref 0.5–4.6)
LYMPHOCYTES NFR BLD: 49 % (ref 13–44)
MCH RBC QN AUTO: 30.7 PG (ref 26.1–32.9)
MCHC RBC AUTO-ENTMCNC: 33.3 G/DL (ref 31.4–35)
MCV RBC AUTO: 92.2 FL (ref 82–102)
MONOCYTES # BLD: 0.6 K/UL (ref 0.1–1.3)
MONOCYTES NFR BLD: 10 % (ref 4–12)
NEUTS SEG # BLD: 2.3 K/UL (ref 1.7–8.2)
NEUTS SEG NFR BLD: 38 % (ref 43–78)
NRBC # BLD: 0 K/UL (ref 0–0.2)
PLATELET # BLD AUTO: 151 K/UL (ref 150–450)
PLATELET COMMENT: ADEQUATE
PMV BLD AUTO: 11.2 FL (ref 9.4–12.3)
POTASSIUM SERPL-SCNC: 4.1 MMOL/L (ref 3.5–5.1)
PROT SERPL-MCNC: 6.7 G/DL (ref 6.3–8.2)
RBC # BLD AUTO: 4.59 M/UL (ref 4.23–5.6)
RBC MORPH BLD: ABNORMAL
SODIUM SERPL-SCNC: 139 MMOL/L (ref 136–145)
WBC # BLD AUTO: 6.1 K/UL (ref 4.3–11.1)
WBC MORPH BLD: ABNORMAL

## 2024-08-19 PROCEDURE — 1036F TOBACCO NON-USER: CPT | Performed by: INTERNAL MEDICINE

## 2024-08-19 PROCEDURE — G8420 CALC BMI NORM PARAMETERS: HCPCS | Performed by: INTERNAL MEDICINE

## 2024-08-19 PROCEDURE — 84165 PROTEIN E-PHORESIS SERUM: CPT

## 2024-08-19 PROCEDURE — 36415 COLL VENOUS BLD VENIPUNCTURE: CPT

## 2024-08-19 PROCEDURE — 85025 COMPLETE CBC W/AUTO DIFF WBC: CPT

## 2024-08-19 PROCEDURE — 1123F ACP DISCUSS/DSCN MKR DOCD: CPT | Performed by: INTERNAL MEDICINE

## 2024-08-19 PROCEDURE — 3017F COLORECTAL CA SCREEN DOC REV: CPT | Performed by: INTERNAL MEDICINE

## 2024-08-19 PROCEDURE — 80053 COMPREHEN METABOLIC PANEL: CPT

## 2024-08-19 PROCEDURE — 83521 IG LIGHT CHAINS FREE EACH: CPT

## 2024-08-19 PROCEDURE — G8428 CUR MEDS NOT DOCUMENT: HCPCS | Performed by: INTERNAL MEDICINE

## 2024-08-19 PROCEDURE — 82784 ASSAY IGA/IGD/IGG/IGM EACH: CPT

## 2024-08-19 PROCEDURE — 86334 IMMUNOFIX E-PHORESIS SERUM: CPT

## 2024-08-19 PROCEDURE — 99214 OFFICE O/P EST MOD 30 MIN: CPT | Performed by: INTERNAL MEDICINE

## 2024-08-19 ASSESSMENT — PATIENT HEALTH QUESTIONNAIRE - PHQ9
1. LITTLE INTEREST OR PLEASURE IN DOING THINGS: NOT AT ALL
2. FEELING DOWN, DEPRESSED OR HOPELESS: NOT AT ALL
SUM OF ALL RESPONSES TO PHQ QUESTIONS 1-9: 0
SUM OF ALL RESPONSES TO PHQ9 QUESTIONS 1 & 2: 0

## 2024-08-19 NOTE — PROGRESS NOTES
Data Source: Patient, [facility] record.    8/19/2024    9:20 AM    Jose Chester [id number]    73 y.o.      Patient Encounter: [facility] Visit    Heme Diagnosis:  MGUS  Heme History (Copied from prior):   72-year-old , runner, family history with brother with multiple myeloma under my care, history of dyslipidemia, idiopathic neuropathy for which he is seeing neurology at [facility], and found to have a small M spike on SPEP as part of workup.  He has seen my colleague Dr. Reese at [facility], with records of no evidence of organ damage.  Patient felt to have MGUS.  He would now like to transfer care to our facility given his brother is also managed here. Neuropathy today described as a nuisance, and mild,not much progression over 3 years, remains active, runs over 6 miles a day.   Interval History:  8/19/24: Patient reports doing well, enjoyed recent trip to [place].  Exercises regularly including brisk walking of about 6 miles 3 times a day.  Seen by cardiology with repeat echo unremarkable.  Blood work today unremarkable including stable paraproteinemia.  We discussed high B12 levels in setting of taking B12 supplement and multivitamin.   REVIEW OF SYSTEMS:  As mentioned above, all other systems were reviewed in full and are negative.    Past Medical History:   Diagnosis Date    Acute sinusitis     Arthritis     Bilateral impacted cerumen     followed by ENT- cerumen removed about 4 months    Chronic sinusitis     Conductive hearing loss     pt denies hearing loss    Esophageal reflux     pt denies    Hearing loss     pt denies    Hearing loss due to cerumen impaction     slight hearing loss at times with impaction    Itching of ear     Left knee pain        Past Surgical History:   Procedure Laterality Date    COLONOSCOPY      LUMBAR LAMINECTOMY      TONSILLECTOMY      WISDOM TOOTH EXTRACTION         Current Outpatient Medications   Medication Sig Dispense

## 2024-08-19 NOTE — PATIENT INSTRUCTIONS
CKD-EPI equation is less accurate in patients with extremes of muscle mass, extra-renal metabolism of creatinine, excessive creatine ingestion, or following therapy that affects renal tubular secretion.      Calcium 08/19/2024 9.6  8.8 - 10.2 MG/DL Final    Total Bilirubin 08/19/2024 0.8  0.0 - 1.2 MG/DL Final    ALT 08/19/2024 38  12 - 65 U/L Final    AST 08/19/2024 56 (H)  15 - 37 U/L Final    Alk Phosphatase 08/19/2024 77  40 - 129 U/L Final    Total Protein 08/19/2024 6.7  6.3 - 8.2 g/dL Final    Albumin 08/19/2024 4.2  3.2 - 4.6 g/dL Final    Globulin 08/19/2024 2.5  2.3 - 3.5 g/dL Final    Albumin/Globulin Ratio 08/19/2024 1.7  1.0 - 1.9   Final    WBC 08/19/2024 6.1  4.3 - 11.1 K/uL Final    RBC 08/19/2024 4.59  4.23 - 5.6 M/uL Final    Hemoglobin 08/19/2024 14.1  13.6 - 17.2 g/dL Final    Hematocrit 08/19/2024 42.3  41.1 - 50.3 % Final    MCV 08/19/2024 92.2  82.0 - 102.0 FL Final    MCH 08/19/2024 30.7  26.1 - 32.9 PG Final    MCHC 08/19/2024 33.3  31.4 - 35.0 g/dL Final    RDW 08/19/2024 12.9  11.9 - 14.6 % Final    Platelets 08/19/2024 151  150 - 450 K/uL Final    MPV 08/19/2024 11.2  9.4 - 12.3 FL Final    nRBC 08/19/2024 0.00  0.0 - 0.2 K/uL Final    **Note: Absolute NRBC parameter is now reported with Hemogram**    Neutrophils % 08/19/2024 38 (L)  43 - 78 % Final    Lymphocytes % 08/19/2024 49 (H)  13 - 44 % Final    Monocytes % 08/19/2024 10  4.0 - 12.0 % Final    Eosinophils % 08/19/2024 2  0.5 - 7.8 % Final    Basophils % 08/19/2024 1  0.0 - 2.0 % Final    Immature Granulocytes % 08/19/2024 0  0.0 - 5.0 % Final    Neutrophils Absolute 08/19/2024 2.3  1.7 - 8.2 K/UL Final    Lymphocytes Absolute 08/19/2024 3.0  0.5 - 4.6 K/UL Final    Monocytes Absolute 08/19/2024 0.6  0.1 - 1.3 K/UL Final    Eosinophils Absolute 08/19/2024 0.1  0.0 - 0.8 K/UL Final    Basophils Absolute 08/19/2024 0.1  0.0 - 0.2 K/UL Final    Immature Granulocytes Absolute 08/19/2024 0.0  0.0 - 0.5 K/UL Final    RBC Comment

## 2024-08-23 LAB
ALBUMIN SERPL ELPH-MCNC: 3.9 G/DL (ref 2.9–4.4)
ALBUMIN/GLOB SERPL: 1.6 (ref 0.7–1.7)
ALPHA1 GLOB SERPL ELPH-MCNC: 0.2 G/DL (ref 0–0.4)
ALPHA2 GLOB SERPL ELPH-MCNC: 0.5 G/DL (ref 0.4–1)
B-GLOBULIN SERPL ELPH-MCNC: 0.9 G/DL (ref 0.7–1.3)
GAMMA GLOB SERPL ELPH-MCNC: 1 G/DL (ref 0.4–1.8)
GLOBULIN SER-MCNC: 2.6 G/DL (ref 2.2–3.9)
IGA SERPL-MCNC: 80 MG/DL (ref 61–437)
IGG SERPL-MCNC: 1070 MG/DL (ref 603–1613)
IGM SERPL-MCNC: 29 MG/DL (ref 15–143)
INTERPRETATION SERPL IEP-IMP: ABNORMAL
M PROTEIN SERPL ELPH-MCNC: 0.5 G/DL
PROT SERPL-MCNC: 6.5 G/DL (ref 6–8.5)

## 2025-02-10 ENCOUNTER — HOSPITAL ENCOUNTER (OUTPATIENT)
Dept: LAB | Age: 75
Discharge: HOME | End: 2025-02-10
Payer: MEDICARE

## 2025-02-10 DIAGNOSIS — D47.2 MGUS (MONOCLONAL GAMMOPATHY OF UNKNOWN SIGNIFICANCE): ICD-10-CM

## 2025-02-10 DIAGNOSIS — E53.8 DISORDER OF VITAMIN B12: ICD-10-CM

## 2025-02-10 LAB
ALBUMIN SERPL-MCNC: 4.2 G/DL (ref 3.2–4.6)
ALBUMIN/GLOB SERPL: 1.6 (ref 1–1.9)
ALP SERPL-CCNC: 65 U/L (ref 40–129)
ALT SERPL-CCNC: 31 U/L (ref 8–55)
ANION GAP SERPL CALC-SCNC: 9 MMOL/L (ref 7–16)
AST SERPL-CCNC: 58 U/L (ref 15–37)
BASOPHILS # BLD: 0.04 K/UL (ref 0–0.2)
BASOPHILS NFR BLD: 0.4 % (ref 0–2)
BILIRUB SERPL-MCNC: 0.5 MG/DL (ref 0–1.2)
BUN SERPL-MCNC: 20 MG/DL (ref 8–23)
CALCIUM SERPL-MCNC: 9.7 MG/DL (ref 8.8–10.2)
CHLORIDE SERPL-SCNC: 101 MMOL/L (ref 98–107)
CO2 SERPL-SCNC: 29 MMOL/L (ref 20–29)
CREAT SERPL-MCNC: 1.13 MG/DL (ref 0.8–1.3)
DIFFERENTIAL METHOD BLD: ABNORMAL
EOSINOPHIL # BLD: 0.06 K/UL (ref 0–0.8)
EOSINOPHIL NFR BLD: 0.7 % (ref 0.5–7.8)
ERYTHROCYTE [DISTWIDTH] IN BLOOD BY AUTOMATED COUNT: 12.7 % (ref 11.9–14.6)
GLOBULIN SER CALC-MCNC: 2.6 G/DL (ref 2.3–3.5)
GLUCOSE SERPL-MCNC: 77 MG/DL (ref 70–99)
HCT VFR BLD AUTO: 41.3 % (ref 41.1–50.3)
HGB BLD-MCNC: 13.8 G/DL (ref 13.6–17.2)
IMM GRANULOCYTES # BLD AUTO: 0.01 K/UL (ref 0–0.5)
IMM GRANULOCYTES NFR BLD AUTO: 0.1 % (ref 0–5)
KAPPA LC FREE SER-MCNC: 23.7 MG/L (ref 2.4–20.7)
KAPPA LC FREE/LAMBDA FREE SER: 1.8 (ref 0.2–0.8)
LAMBDA LC FREE SERPL-MCNC: 13.1 MG/L (ref 4.2–27.7)
LYMPHOCYTES # BLD: 4.67 K/UL (ref 0.5–4.6)
LYMPHOCYTES NFR BLD: 51.3 % (ref 13–44)
MCH RBC QN AUTO: 30.7 PG (ref 26.1–32.9)
MCHC RBC AUTO-ENTMCNC: 33.4 G/DL (ref 31.4–35)
MCV RBC AUTO: 92 FL (ref 82–102)
MONOCYTES # BLD: 1.17 K/UL (ref 0.1–1.3)
MONOCYTES NFR BLD: 12.9 % (ref 4–12)
NEUTS SEG # BLD: 3.15 K/UL (ref 1.7–8.2)
NEUTS SEG NFR BLD: 34.6 % (ref 43–78)
NRBC # BLD: 0 K/UL (ref 0–0.2)
PLATELET # BLD AUTO: 116 K/UL (ref 150–450)
PLATELET COMMENT: ABNORMAL
PMV BLD AUTO: 12 FL (ref 9.4–12.3)
POTASSIUM SERPL-SCNC: 4.3 MMOL/L (ref 3.5–5.1)
PROT SERPL-MCNC: 6.8 G/DL (ref 6.3–8.2)
RBC # BLD AUTO: 4.49 M/UL (ref 4.23–5.6)
RBC MORPH BLD: ABNORMAL
SODIUM SERPL-SCNC: 139 MMOL/L (ref 136–145)
VIT B12 SERPL-MCNC: 702 PG/ML (ref 193–986)
WBC # BLD AUTO: 9.1 K/UL (ref 4.3–11.1)
WBC MORPH BLD: ABNORMAL

## 2025-02-10 PROCEDURE — 82784 ASSAY IGA/IGD/IGG/IGM EACH: CPT

## 2025-02-10 PROCEDURE — 85025 COMPLETE CBC W/AUTO DIFF WBC: CPT

## 2025-02-10 PROCEDURE — 83521 IG LIGHT CHAINS FREE EACH: CPT

## 2025-02-10 PROCEDURE — 36415 COLL VENOUS BLD VENIPUNCTURE: CPT

## 2025-02-10 PROCEDURE — 82607 VITAMIN B-12: CPT

## 2025-02-10 PROCEDURE — 80053 COMPREHEN METABOLIC PANEL: CPT

## 2025-02-10 PROCEDURE — 0077U IG PARAPROTEIN QUAL BLD/UR: CPT

## 2025-02-13 LAB — IMMUNOLOGIST REVIEW: NORMAL

## 2025-02-24 ENCOUNTER — OFFICE VISIT (OUTPATIENT)
Dept: ONCOLOGY | Age: 75
End: 2025-02-24
Payer: MEDICARE

## 2025-02-24 VITALS
HEART RATE: 57 BPM | TEMPERATURE: 97.5 F | OXYGEN SATURATION: 100 % | WEIGHT: 154.2 LBS | BODY MASS INDEX: 22.07 KG/M2 | DIASTOLIC BLOOD PRESSURE: 79 MMHG | SYSTOLIC BLOOD PRESSURE: 128 MMHG | HEIGHT: 70 IN | RESPIRATION RATE: 17 BRPM

## 2025-02-24 DIAGNOSIS — D47.2 MGUS (MONOCLONAL GAMMOPATHY OF UNKNOWN SIGNIFICANCE): Primary | ICD-10-CM

## 2025-02-24 PROCEDURE — 1159F MED LIST DOCD IN RCRD: CPT | Performed by: INTERNAL MEDICINE

## 2025-02-24 PROCEDURE — 1160F RVW MEDS BY RX/DR IN RCRD: CPT | Performed by: INTERNAL MEDICINE

## 2025-02-24 PROCEDURE — 1036F TOBACCO NON-USER: CPT | Performed by: INTERNAL MEDICINE

## 2025-02-24 PROCEDURE — 1126F AMNT PAIN NOTED NONE PRSNT: CPT | Performed by: INTERNAL MEDICINE

## 2025-02-24 PROCEDURE — 3017F COLORECTAL CA SCREEN DOC REV: CPT | Performed by: INTERNAL MEDICINE

## 2025-02-24 PROCEDURE — G8427 DOCREV CUR MEDS BY ELIG CLIN: HCPCS | Performed by: INTERNAL MEDICINE

## 2025-02-24 PROCEDURE — G8420 CALC BMI NORM PARAMETERS: HCPCS | Performed by: INTERNAL MEDICINE

## 2025-02-24 PROCEDURE — 99214 OFFICE O/P EST MOD 30 MIN: CPT | Performed by: INTERNAL MEDICINE

## 2025-02-24 PROCEDURE — 1123F ACP DISCUSS/DSCN MKR DOCD: CPT | Performed by: INTERNAL MEDICINE

## 2025-02-24 RX ORDER — ROSUVASTATIN CALCIUM 20 MG/1
20 TABLET, COATED ORAL DAILY
COMMUNITY
Start: 2025-01-03

## 2025-02-24 ASSESSMENT — PATIENT HEALTH QUESTIONNAIRE - PHQ9
2. FEELING DOWN, DEPRESSED OR HOPELESS: NOT AT ALL
1. LITTLE INTEREST OR PLEASURE IN DOING THINGS: NOT AT ALL
SUM OF ALL RESPONSES TO PHQ QUESTIONS 1-9: 0
SUM OF ALL RESPONSES TO PHQ9 QUESTIONS 1 & 2: 0
SUM OF ALL RESPONSES TO PHQ QUESTIONS 1-9: 0

## 2025-02-24 NOTE — PROGRESS NOTES
Data Source: Patient, [facility] record.    2/24/2025    9:16 AM    Jose Chester [id number]    74 y.o.      Patient Encounter: [facility] Visit    Heme Diagnosis:  MGUS  Heme History (Copied from prior):   72-year-old , runner, family history with brother with multiple myeloma under my care, history of dyslipidemia, idiopathic neuropathy for which he is seeing neurology at [facility], and found to have a small M spike on SPEP as part of workup.  He has seen my colleague Dr. Reese at [facility], with records of no evidence of organ damage.  Patient felt to have MGUS.  He would now like to transfer care to our facility given his brother is also managed here. Neuropathy today described as a nuisance, and mild,not much progression over 3 years, remains active, runs over 6 miles a day.   Interval History:  2/24/2025: Continues to do well.  Regularly exercises.  Blood work unremarkable.  Thrombocytopenia off-and-on but mostly stable.   Also regularly sees primary care.  Some neutropenia and lymphocytosis with follow-up blood work with PCP in a few months.  Paraproteinemia staying low.     REVIEW OF SYSTEMS:  As mentioned above, all other systems were reviewed in full and are negative.    Past Medical History:   Diagnosis Date    Acute sinusitis     Arthritis     Bilateral impacted cerumen     followed by ENT- cerumen removed about 4 months    Chronic sinusitis     Conductive hearing loss     pt denies hearing loss    Esophageal reflux     pt denies    Hearing loss     pt denies    Hearing loss due to cerumen impaction     slight hearing loss at times with impaction    Itching of ear     Left knee pain        Past Surgical History:   Procedure Laterality Date    COLONOSCOPY      LUMBAR LAMINECTOMY      TONSILLECTOMY      WISDOM TOOTH EXTRACTION         Current Outpatient Medications   Medication Sig Dispense Refill    rosuvastatin (CRESTOR) 20 MG tablet Take 1 tablet by

## 2025-02-24 NOTE — PATIENT INSTRUCTIONS
Patient Information from Today's Visit    The members of your [facility]cology Medical [facility] are listed below:    Physician Provider: Carolyn Cervantes Medical Oncologist  Advanced Practice Clinician: Raissa Meyers NP  Registered Nurse: Magdaelna WALTER RN  Navigator: N/A  Medical Assistant: Shirlene WALTER MA  : Talita MASON   Supportive Care Services: Jennie CAIN LMSW    Diagnosis: MGUS      Follow Up Instructions:   - lab work reviewed   - follow up in 6 months with labs one week prior       Treatment Summary has been discussed and given to patient:No      Current Labs:   No visits with results within 3 Day(s) from this visit.   Latest known visit with results is:   Hospital Outpatient Visit on 02/10/2025   Component Date Value Ref Range Status    Vitamin B-12 02/10/2025 702  193 - 986 pg/mL Final    WBC 02/10/2025 9.1  4.3 - 11.1 K/uL Final    PERIPHERAL REVIEW TO FOLLOW    RBC 02/10/2025 4.49  4.23 - 5.6 M/uL Final    Hemoglobin 02/10/2025 13.8  13.6 - 17.2 g/dL Final    Hematocrit 02/10/2025 41.3  41.1 - 50.3 % Final    MCV 02/10/2025 92.0  82.0 - 102.0 FL Final    MCH 02/10/2025 30.7  26.1 - 32.9 PG Final    MCHC 02/10/2025 33.4  31.4 - 35.0 g/dL Final    RDW 02/10/2025 12.7  11.9 - 14.6 % Final    Platelets 02/10/2025 116 (L)  150 - 450 K/uL Final    MPV 02/10/2025 12.0  9.4 - 12.3 FL Final    nRBC 02/10/2025 0.00  0.0 - 0.2 K/uL Final    **Note: Absolute NRBC parameter is now reported with Hemogram**    Neutrophils % 02/10/2025 34.6 (L)  43.0 - 78.0 % Final    Lymphocytes % 02/10/2025 51.3 (H)  13.0 - 44.0 % Final    Monocytes % 02/10/2025 12.9 (H)  4.0 - 12.0 % Final    Eosinophils % 02/10/2025 0.7  0.5 - 7.8 % Final    Basophils % 02/10/2025 0.4  0.0 - 2.0 % Final    Immature Granulocytes % 02/10/2025 0.1  0.0 - 5.0 % Final    Neutrophils Absolute 02/10/2025 3.15  1.70 - 8.20 K/UL Final    Lymphocytes Absolute 02/10/2025 4.67 (H)  0.50 - 4.60 K/UL Final    Monocytes Absolute 02/10/2025 1.17  0.10 - 1.30 K/UL Final

## 2025-04-07 ENCOUNTER — HOSPITAL ENCOUNTER (OUTPATIENT)
Dept: PHYSICAL THERAPY | Age: 75
Setting detail: RECURRING SERIES
Discharge: HOME | End: 2025-04-10
Payer: MEDICARE

## 2025-04-07 PROCEDURE — 97110 THERAPEUTIC EXERCISES: CPT

## 2025-04-07 PROCEDURE — 97162 PT EVAL MOD COMPLEX 30 MIN: CPT

## 2025-04-07 NOTE — PROGRESS NOTES
4/28/2025  2:45 PM Steven Abrams, PT SFO[facility] SFO   5/1/2025  8:00 AM Steven Abrams, PT SFO[facility] SFO   8/20/2025  2:00 PM PERIPHERAL GCCOIG GCC   8/27/2025  2:30 PM Carolyn Cervantes MD [facility] AMB

## 2025-04-07 NOTE — THERAPY EVALUATION
Jose Chester  : 1950  Primary: Medicare Part A And B (Medicare)  Secondary: [employer] HE[employer] CARE MEDICARE SUPP [facility] [place] [address] [place] [address]  Phone: [phone]  Fax: [phone]           Plan of Care/Certification Expiration Date:     Frequency/Duration:      Time In/Out:          PT Visit Info:         Visit Count:  1                OUTPATIENT PHYSICAL THERAPY:             {OP Note Type:10622::\"Initial Assessment\"} 2025               Episode (low back pain)         Treatment Diagnosis:  ***   No data found  Medical/Referring Diagnosis:    {Medical Diagnosis:51719}   Referring Physician:  Jose Sanchez MD MD Orders:  PT Eval and Treat ***  Return MD Appt:  ***  Date of Onset:    ***  Allergies:  Patient has no known allergies.  Restrictions/Precautions:    {Precautions/Restrictions:41590}      Medications Last Reviewed: 2025     SUBJECTIVE   History of Injury/Illness (Reason for Referral):  ***  Patient Stated Goal(s):  \"***\"  Initial Pain Level:       /10   Post Session Pain Level:      /10  Past Medical History/Comorbidities:   Mr. Chester  has a past medical history of Acute sinusitis, Arthritis, Bilateral impacted cerumen, Chronic sinusitis, Conductive hearing loss, Esophageal reflux, Hearing loss, Hearing loss due to cerumen impaction, Itching of ear, and Left knee pain.  Mr. Chester  has a past surgical history that includes lumbar laminectomy; Tonsillectomy; [place] tooth extraction; and Colonoscopy.  Social History/Living Environment:   {PT/OT Social History/Living Environment:[address]552}    Prior Level of Function/Work/Activity:   {PT/OT Level of Function/Work/Activity:62343}     Learning:   Does the patient/guardian have any barriers to learning?: (Patient-Rptd) No barriers  How does the patient/guardian prefer to learn new concepts?: (Patient-Rptd) Listening; Reading; Demonstration; Pictures/Videos    Fall Risk Scale:

## 2025-04-14 ENCOUNTER — HOSPITAL ENCOUNTER (OUTPATIENT)
Dept: PHYSICAL THERAPY | Age: 75
Setting detail: RECURRING SERIES
Discharge: HOME | End: 2025-04-17
Payer: MEDICARE

## 2025-04-14 PROCEDURE — 97110 THERAPEUTIC EXERCISES: CPT

## 2025-04-14 NOTE — PROGRESS NOTES
Jose Chester  : 1950  Primary: Medicare Part A And B (Medicare)  Secondary: [employer]ARP HEALTH CARE MEDICARE SUPP [facility] [place]  [address] [place] [address]  Phone: [phone]  Fax: [phone] Plan Frequency: 2x/week for 4 weeks    Plan of Care/Certification Expiration Date: 25        Plan of Care/Certification Expiration Date:  Plan of Care/Certification Expiration Date: 25    Frequency/Duration: Plan Frequency: 2x/week for 4 weeks      Time In/Out:   Time In: 1445  Time Out: 1530      PT Visit Info:         Visit Count:  2    OUTPATIENT PHYSICAL THERAPY:   Treatment Note 2025       Episode  (low back pain)               Treatment Diagnosis:    Other low back pain  Pain in right hip  Difficulty in walking, not elsewhere classified  Medical/Referring Diagnosis:    Acute bilateral low back pain, unspecified whether sciatica present [M54.50]    Referring Physician:  Jose Sanchez MD MD Orders:  PT Eval and Treat   Return MD Appt:  not currently scheduled   Date of Onset:  Onset Date: 03/10/25     Allergies:   Patient has no known allergies.  Restrictions/Precautions:   None      Interventions Planned (Treatment may consist of any combination of the following):     See Assessment Note    Subjective Comments:   Notes the back has been feeling much better. Has been able to walk with improved comfort at a brisk pace. Still notes some soreness through the lateral hip/thigh.   Initial Pain Level:      1/10  Post Session Pain Level:       1/10  Medications Last Reviewed: 2025  Updated Objective Findings:    25 eval.  Treatment   TREATMENT:   THERAPEUTIC ACTIVITY: ( see below for minutes): Therapeutic activities per grid below to improve mobility, strength, balance and coordination. Required minimal visual, verbal, manual and tactile cues to improve independence and safety with daily activities .  THERAPEUTIC EXERCISE: (see below for

## 2025-04-17 ENCOUNTER — HOSPITAL ENCOUNTER (OUTPATIENT)
Dept: PHYSICAL THERAPY | Age: 75
Setting detail: RECURRING SERIES
Discharge: HOME | End: 2025-04-20
Payer: MEDICARE

## 2025-04-17 PROCEDURE — 97140 MANUAL THERAPY 1/> REGIONS: CPT

## 2025-04-17 NOTE — PROGRESS NOTES
Jose Chester  : 1950  Primary: Medicare Part A And B (Medicare)  Secondary: [employer] HE[employer] C[employer] MEDICARE SUPP [facility] [place] [address] [place] [address]  Phone: [phone]  Fax: [phone] Plan Frequency: 2x/week for 4 weeks    Plan of Care/Certification Expiration Date: 25        Plan of Care/Certification Expiration Date:  Plan of Care/Certification Expiration Date: 25    Frequency/Duration: Plan Frequency: 2x/week for 4 weeks      Time In/Out:   Time In: 1400  Time Out: 1445      PT Visit Info:         Visit Count:  3    OUTPATIENT PHYSICAL THERAPY:   Treatment Note 2025       Episode  (low back pain)               Treatment Diagnosis:    Other low back pain  Pain in right hip  Difficulty in walking, not elsewhere classified  Medical/Referring Diagnosis:    Acute bilateral low back pain, unspecified whether sciatica present [M54.50]    Referring Physician:  Jose Sanchez MD MD Orders:  PT Eval and Treat   Return MD Appt:  not currently scheduled   Date of Onset:  Onset Date: 03/10/25     Allergies:   Patient has no known allergies.  Restrictions/Precautions:   None      Interventions Planned (Treatment may consist of any combination of the following):     See Assessment Note    Subjective Comments:   Primarily notes tightness through the lateral thigh. Has been progressing with walking and increasing pace.   Initial Pain Level:      10  Post Session Pain Level:       1/10  Medications Last Reviewed: 2025  Updated Objective Findings:    25 eval.  Treatment   TREATMENT:   THERAPEUTIC ACTIVITY: ( see below for minutes): Therapeutic activities per grid below to improve mobility, strength, balance and coordination. Required minimal visual, verbal, manual and tactile cues to improve independence and safety with daily activities .  THERAPEUTIC EXERCISE: (see below for minutes): Exercises per grid below to improve mobility,

## 2025-04-21 ENCOUNTER — HOSPITAL ENCOUNTER (OUTPATIENT)
Dept: PHYSICAL THERAPY | Age: 75
Setting detail: RECURRING SERIES
Discharge: HOME | End: 2025-04-24
Payer: MEDICARE

## 2025-04-21 PROCEDURE — 97140 MANUAL THERAPY 1/> REGIONS: CPT

## 2025-04-21 NOTE — PROGRESS NOTES
Jose Chester  : 1950  Primary: Medicare Part A And B (Medicare)  Secondary: [employer]ARP HE[employer] C[employer] MEDICARE SUPP [facility] [place] [address] [place] [address]  Phone: [phone]  Fax: [phone] Plan Frequency: 2x/week for 4 weeks    Plan of Care/Certification Expiration Date: 25        Plan of Care/Certification Expiration Date:  Plan of Care/Certification Expiration Date: 25    Frequency/Duration: Plan Frequency: 2x/week for 4 weeks      Time In/Out:   Time In: 1445  Time Out: 1530      PT Visit Info:         Visit Count:  4    OUTPATIENT PHYSICAL THERAPY:   Treatment Note 2025       Episode  (low back pain)               Treatment Diagnosis:    Other low back pain  Pain in right hip  Difficulty in walking, not elsewhere classified  Medical/Referring Diagnosis:    Acute bilateral low back pain, unspecified whether sciatica present [M54.50]    Referring Physician:  Jose Sanchez MD MD Orders:  PT Eval and Treat   Return MD Appt:  not currently scheduled   Date of Onset:  Onset Date: 03/10/25     Allergies:   Patient has no known allergies.  Restrictions/Precautions:   None      Interventions Planned (Treatment may consist of any combination of the following):     See Assessment Note    Subjective Comments:   Notes no tenderness through the lateral thigh with walking. Continues to note some tightness through the low back and lateral glute. .   Initial Pain Level:      1/10  Post Session Pain Level:       1/10  Medications Last Reviewed: 2025  Updated Objective Findings:    25 eval.  Treatment   TREATMENT:   THERAPEUTIC ACTIVITY: ( see below for minutes): Therapeutic activities per grid below to improve mobility, strength, balance and coordination. Required minimal visual, verbal, manual and tactile cues to improve independence and safety with daily activities .  THERAPEUTIC EXERCISE: (see below for minutes): Exercises per grid

## 2025-04-23 ENCOUNTER — HOSPITAL ENCOUNTER (OUTPATIENT)
Dept: PHYSICAL THERAPY | Age: 75
Setting detail: RECURRING SERIES
Discharge: HOME | End: 2025-04-26
Payer: MEDICARE

## 2025-04-23 PROCEDURE — 97140 MANUAL THERAPY 1/> REGIONS: CPT

## 2025-04-23 NOTE — PROGRESS NOTES
Jose Chester  : 1950  Primary: Medicare Part A And B (Medicare)  Secondary: [employer] MEDICARE SUPP [facility] @ [place] [address] [place] [address]  Phone: [phone]  Fax: [phone] Plan Frequency: 2x/week for 4 weeks    Plan of Care/Certification Expiration Date: 25        Plan of Care/Certification Expiration Date:  Plan of Care/Certification Expiration Date: 25    Frequency/Duration: Plan Frequency: 2x/week for 4 weeks      Time In/Out:   Time In: 1530  Time Out: 1615      PT Visit Info:         Visit Count:  5    OUTPATIENT PHYSICAL THERAPY:   Treatment Note 2025       Episode  (low back pain)               Treatment Diagnosis:    Other low back pain  Pain in right hip  Difficulty in walking, not elsewhere classified  Medical/Referring Diagnosis:    Acute bilateral low back pain, unspecified whether sciatica present [M54.50]    Referring Physician:  Jose Sanchez MD MD Orders:  PT Eval and Treat   Return MD Appt:  not currently scheduled   Date of Onset:  Onset Date: 03/10/25     Allergies:   Patient has no known allergies.  Restrictions/Precautions:   None      Interventions Planned (Treatment may consist of any combination of the following):     See Assessment Note    Subjective Comments:   Notes tenderness improving through the lateral thigh. Still tightness noted through the posterior and lateral glute.    Initial Pain Level:      1/10  Post Session Pain Level:       1/10  Medications Last Reviewed: 2025  Updated Objective Findings:    25 eval.  Treatment   TREATMENT:   THERAPEUTIC ACTIVITY: ( see below for minutes): Therapeutic activities per grid below to improve mobility, strength, balance and coordination. Required minimal visual, verbal, manual and tactile cues to improve independence and safety with daily activities .  THERAPEUTIC EXERCISE: (see below for minutes): Exercises per grid below to improve

## 2025-04-28 ENCOUNTER — HOSPITAL ENCOUNTER (OUTPATIENT)
Dept: PHYSICAL THERAPY | Age: 75
Setting detail: RECURRING SERIES
Discharge: HOME | End: 2025-05-01
Payer: MEDICARE

## 2025-04-28 PROCEDURE — 97140 MANUAL THERAPY 1/> REGIONS: CPT

## 2025-04-28 NOTE — PROGRESS NOTES
None  Recommendations/Intent for next treatment session: Next visit will focus on progression of strength and return to prior level of function.    >Total Treatment Billable Duration:  45 minutes   Time In: 1445  Time Out: 1530     Steven Abrams PT         Charge Capture  Events  [employer] Maps Portal  Appt Desk  Attendance Report     Future Appointments   Date Time Provider Department Center   5/1/2025  8:00 AM Steven Abrams, PT [facility]   5/7/2025 10:15 AM Steven Abrams, PT [facility]   5/9/2025 12:30 PM Steven Abrams, PT [facility]   5/12/2025  4:15 PM Steven Abrams, PT [facility]   5/14/2025 10:15 AM Steven Abrams, PT [facility]   5/21/2025 10:15 AM Steven Abrams, PT [facility]   5/23/2025 10:15 AM Steven Abrams, PT [facility]   8/20/2025  2:00 PM [facility]   8/27/2025  2:30 PM Carolyn Cervantes MD [facility]

## 2025-05-01 ENCOUNTER — HOSPITAL ENCOUNTER (OUTPATIENT)
Dept: PHYSICAL THERAPY | Age: 75
Setting detail: RECURRING SERIES
Discharge: HOME | End: 2025-05-04
Payer: MEDICARE

## 2025-05-01 PROCEDURE — 97140 MANUAL THERAPY 1/> REGIONS: CPT

## 2025-05-01 PROCEDURE — 97110 THERAPEUTIC EXERCISES: CPT

## 2025-05-01 NOTE — PROGRESS NOTES
Jose Wu Chester  : 1950  Primary: Medicare Part A And B (Medicare)  Secondary: [employer] MEDICARE SUPP [facility] [place]  [address] [place] [address]  Phone: [phone]  Fax: [phone] Plan Frequency: 2x/week for 4 weeks    Plan of Care/Certification Expiration Date: 25        Plan of Care/Certification Expiration Date:  Plan of Care/Certification Expiration Date: 25    Frequency/Duration: Plan Frequency: 2x/week for 4 weeks      Time In/Out:   Time In: 0800  Time Out: 0845      PT Visit Info:         Visit Count:  7    OUTPATIENT PHYSICAL THERAPY:   Treatment Note 2025       Episode  (low back pain)               Treatment Diagnosis:    Other low back pain  Pain in right hip  Difficulty in walking, not elsewhere classified  Medical/Referring Diagnosis:    Acute bilateral low back pain, unspecified whether sciatica present [M54.50]    Referring Physician:  Jose Sanchez MD MD Orders:  PT Eval and Treat   Return MD Appt:  not currently scheduled   Date of Onset:  Onset Date: 03/10/25     Allergies:   Patient has no known allergies.  Restrictions/Precautions:   None      Interventions Planned (Treatment may consist of any combination of the following):     See Assessment Note    Subjective Comments:   No longer having radicular or lateral leg pain. Has been able to comfortably extend his walking. Notes continued tenderness and soreness through the glute with prolonged sitting.   Initial Pain Level:      1/10  Post Session Pain Level:       1/10  Medications Last Reviewed: 2025  Updated Objective Findings:    25 eval.  Treatment   TREATMENT:   THERAPEUTIC ACTIVITY: ( see below for minutes): Therapeutic activities per grid below to improve mobility, strength, balance and coordination. Required minimal visual, verbal, manual and tactile cues to improve independence and safety with daily activities .  THERAPEUTIC EXERCISE:

## 2025-05-07 ENCOUNTER — HOSPITAL ENCOUNTER (OUTPATIENT)
Dept: PHYSICAL THERAPY | Age: 75
Setting detail: RECURRING SERIES
Discharge: HOME | End: 2025-05-10
Payer: MEDICARE

## 2025-05-07 PROCEDURE — 97140 MANUAL THERAPY 1/> REGIONS: CPT

## 2025-05-07 NOTE — PROGRESS NOTES
Jose Chester  : 1950  Primary: Medicare Part A And B (Medicare)  Secondary: [employer] MEDICARE SUPP [facility] @ [place]  [address] [place] [address]  Phone: [phone]  Fax: [phone] Plan Frequency: 2x/week for 4 weeks    Plan of Care/Certification Expiration Date: 25        Plan of Care/Certification Expiration Date:  Plan of Care/Certification Expiration Date: 25    Frequency/Duration: Plan Frequency: 2x/week for 4 weeks      Time In/Out:   Time In: 1015  Time Out: 1100      PT Visit Info:         Visit Count:  8    OUTPATIENT PHYSICAL THERAPY:   Treatment Note 2025       Episode  (low back pain)               Treatment Diagnosis:    Other low back pain  Pain in right hip  Difficulty in walking, not elsewhere classified  Medical/Referring Diagnosis:    Acute bilateral low back pain, unspecified whether sciatica present [M54.50]    Referring Physician:  Jose Sanchez MD MD Orders:  PT Eval and Treat   Return MD Appt:  not currently scheduled   Date of Onset:  Onset Date: 03/10/25     Allergies:   Patient has no known allergies.  Restrictions/Precautions:   None      Interventions Planned (Treatment may consist of any combination of the following):     See Assessment Note    Subjective Comments:   Notes the back has been feeling much better. Still some soreness and tightness through the hip flexor musculature.   Initial Pain Level:      10  Post Session Pain Level:       1/10  Medications Last Reviewed: 2025  Updated Objective Findings:    25 eval.  Treatment   TREATMENT:   THERAPEUTIC ACTIVITY: ( see below for minutes): Therapeutic activities per grid below to improve mobility, strength, balance and coordination. Required minimal visual, verbal, manual and tactile cues to improve independence and safety with daily activities .  THERAPEUTIC EXERCISE: (see below for minutes): Exercises per grid below to improve

## 2025-05-09 ENCOUNTER — HOSPITAL ENCOUNTER (OUTPATIENT)
Dept: PHYSICAL THERAPY | Age: 75
Setting detail: RECURRING SERIES
Discharge: HOME | End: 2025-05-12
Payer: MEDICARE

## 2025-05-09 PROCEDURE — 97140 MANUAL THERAPY 1/> REGIONS: CPT

## 2025-05-09 NOTE — PROGRESS NOTES
Jose Chester  : 1950  Primary: Medicare Part A And B (Medicare)  Secondary: [employer] MEDICARE SUPP [facility] @ [place] [address] [address] [place] [address]  Phone: [phone]  Fax: [phone] Plan Frequency: 2x/week for 4 weeks    Plan of Care/Certification Expiration Date: 25        Plan of Care/Certification Expiration Date:  Plan of Care/Certification Expiration Date: 25    Frequency/Duration: Plan Frequency: 2x/week for 4 weeks      Time In/Out:   Time In: 1230  Time Out: 1315      PT Visit Info:         Visit Count:  9    OUTPATIENT PHYSICAL THERAPY:   Treatment Note 2025       Episode  (low back pain)               Treatment Diagnosis:    Other low back pain  Pain in right hip  Difficulty in walking, not elsewhere classified  Medical/Referring Diagnosis:    Acute bilateral low back pain, unspecified whether sciatica present [M54.50]    Referring Physician:  Jose Sanchez MD MD Orders:  PT Eval and Treat   Return MD Appt:  not currently scheduled   Date of Onset:  Onset Date: 03/10/25     Allergies:   Patient has no known allergies.  Restrictions/Precautions:   None      Interventions Planned (Treatment may consist of any combination of the following):     See Assessment Note    Subjective Comments:   Has been progressing intensity with walking. No discomfort down the leg but notes continued tightness through the anterior hip/hip flexor   Initial Pain Level:      10  Post Session Pain Level:       1/10  Medications Last Reviewed: 2025  Updated Objective Findings:    25 eval.  Treatment   TREATMENT:   THERAPEUTIC ACTIVITY: ( see below for minutes): Therapeutic activities per grid below to improve mobility, strength, balance and coordination. Required minimal visual, verbal, manual and tactile cues to improve independence and safety with daily activities .  THERAPEUTIC EXERCISE: (see below for minutes): Exercises per grid

## 2025-05-12 ENCOUNTER — HOSPITAL ENCOUNTER (OUTPATIENT)
Dept: PHYSICAL THERAPY | Age: 75
Setting detail: RECURRING SERIES
Discharge: HOME | End: 2025-05-15
Payer: MEDICARE

## 2025-05-12 PROCEDURE — 97140 MANUAL THERAPY 1/> REGIONS: CPT

## 2025-05-12 NOTE — PROGRESS NOTES
Jose Chester  : 1950  Primary: Medicare Part A And B (Medicare)  Secondary: [employer] MEDICARE SUPP [facility] @ [place] [address] [address] [place] [address]  Phone: [phone]  Fax: [phone] Plan Frequency: 2x/week for 4 weeks    Plan of Care/Certification Expiration Date: 25        Plan of Care/Certification Expiration Date:  Plan of Care/Certification Expiration Date: 25    Frequency/Duration: Plan Frequency: 2x/week for 4 weeks      Time In/Out:   Time In: 1615  Time Out: 1700      PT Visit Info:         Visit Count:  10    OUTPATIENT PHYSICAL THERAPY:   Treatment Note 2025       Episode  (low back pain)               Treatment Diagnosis:    Other low back pain  Pain in right hip  Difficulty in walking, not elsewhere classified  Medical/Referring Diagnosis:    Acute bilateral low back pain, unspecified whether sciatica present [M54.50]    Referring Physician:  Jose Sanchez MD MD Orders:  PT Eval and Treat   Return MD Appt:  not currently scheduled   Date of Onset:  Onset Date: 03/10/25     Allergies:   Patient has no known allergies.  Restrictions/Precautions:   None      Interventions Planned (Treatment may consist of any combination of the following):     See Assessment Note    Subjective Comments:   Feels like walking has been going better. Does note stiffness and discomfort with prolonged sitting, or sleeping with tightness through the low back and hips.   Initial Pain Level:      10  Post Session Pain Level:       10  Medications Last Reviewed: 2025  Updated Objective Findings:    25 eval.    25 update:   Observation:   Demonstrates normal upright standing posture  Palpation:   Continued tenderness to R piriformis, TFL, ITB  Strength: Hip extension: 4+/5 R   Functional Mobility:   Able to ambulate for 30 minutes without discomfort. Continued discomfort with sitting >30 minutes.    Goals: (Goals have been

## 2025-05-14 ENCOUNTER — APPOINTMENT (OUTPATIENT)
Dept: PHYSICAL THERAPY | Age: 75
End: 2025-05-14
Payer: MEDICARE

## 2025-05-21 ENCOUNTER — HOSPITAL ENCOUNTER (OUTPATIENT)
Dept: PHYSICAL THERAPY | Age: 75
Setting detail: RECURRING SERIES
Discharge: HOME | End: 2025-05-24
Payer: MEDICARE

## 2025-05-21 PROCEDURE — 97140 MANUAL THERAPY 1/> REGIONS: CPT

## 2025-05-21 NOTE — PROGRESS NOTES
agreed upon with patient.)  Short-Term Functional Goals: Time Frame: 2 weeks  Patient will be independent with HEP. met  Patient will be able to sit for >30 minutes without pain to decrease symptom irritability. ongoing  Discharge Goals: Time Frame: 4 weeks  Patient will have no pain with daily activity. ongoing  Patient will be able to walk for 30 minutes without pain to return to prior level of function. met  Patient will have no tenderness to palpation of the gluteal musculature to normalize tone for prolonged sitting and standing.ongoing  Treatment   TREATMENT:   THERAPEUTIC ACTIVITY: ( see below for minutes): Therapeutic activities per grid below to improve mobility, strength, balance and coordination. Required minimal visual, verbal, manual and tactile cues to improve independence and safety with daily activities .  THERAPEUTIC EXERCISE: (see below for minutes): Exercises per grid below to improve mobility, strength, balance and coordination. Required minimal verbal and manual cues to promote proper body alignment, promote proper body posture and promote proper body mechanics. Progressed resistance, range, repetitions and complexity of movement as indicated.  MANUAL THERAPY: (see below for minutes): Joint mobilization and Soft tissue mobilization was utilized and necessary because of the patient's restricted joint motion, painful spasm, loss of articular motion and restricted motion of soft tissue.   MODALITIES: (see below for minutes): to decrease pain   SELF CARE: (see below for minutes): Procedure(s) (per grid) utilized to improve and/or restore self-care/home management as related to dressing, bathing and grooming. Required minimal verbal cueing to facilitate activities of daily living skills and compensatory activities    Date: 5/1/25 (visit 7)  5/7/25 (visit 8)  5/9/25 (visit 9)  5/12/25 (visit 10)  5/21/25 (visit 11)   Modalities:                                Therapeutic Exercise:

## 2025-05-23 ENCOUNTER — HOSPITAL ENCOUNTER (OUTPATIENT)
Dept: PHYSICAL THERAPY | Age: 75
Setting detail: RECURRING SERIES
Discharge: HOME | End: 2025-05-26
Payer: MEDICARE

## 2025-05-23 PROCEDURE — 97110 THERAPEUTIC EXERCISES: CPT

## 2025-05-23 NOTE — PROGRESS NOTES
Jose Chester  : 1950  Primary: Medicare Part A And B (Medicare)  Secondary: [employer] MEDICARE SUPP [facility] @ [place]  [address] [place] [address]  Phone: [phone]  Fax: [phone] Plan Frequency: 2x/week for 4 weeks    Plan of Care/Certification Expiration Date: 25        Plan of Care/Certification Expiration Date:  Plan of Care/Certification Expiration Date: 25    Frequency/Duration: Plan Frequency: 2x/week for 4 weeks      Time In/Out:   Time In: 1015  Time Out: 1100      PT Visit Info:         Visit Count:  12    OUTPATIENT PHYSICAL THERAPY:   Treatment Note 2025       Episode  (low back pain)               Treatment Diagnosis:    Other low back pain  Pain in right hip  Difficulty in walking, not elsewhere classified  Medical/Referring Diagnosis:    Acute bilateral low back pain, unspecified whether sciatica present [M54.50]    Referring Physician:  Jose Sanchez MD MD Orders:  PT Eval and Treat   Return MD Appt:  not currently scheduled   Date of Onset:  Onset Date: 03/10/25     Allergies:   Patient has no known allergies.  Restrictions/Precautions:   None      Interventions Planned (Treatment may consist of any combination of the following):     See Assessment Note    Subjective Comments:   Notes the hip has been feeling much better. Still some residual soreness at the R SI joint, but no discomfort in the leg.   Initial Pain Level:      1/10  Post Session Pain Level:       1/10  Medications Last Reviewed: 2025  Updated Objective Findings:    25 eval.    25 update:   Observation:   Demonstrates normal upright standing posture  Palpation:   Continued tenderness to R piriformis, TFL, ITB  Strength: Hip extension: 4+/5 R   Functional Mobility:   Able to ambulate for 30 minutes without discomfort. Continued discomfort with sitting >30 minutes.    Goals: (Goals have been discussed and agreed upon with

## 2025-08-11 ENCOUNTER — OFFICE VISIT (OUTPATIENT)
Age: 75
End: 2025-08-11
Payer: MEDICARE

## 2025-08-11 VITALS
WEIGHT: 158 LBS | BODY MASS INDEX: 22.62 KG/M2 | HEIGHT: 70 IN | SYSTOLIC BLOOD PRESSURE: 110 MMHG | DIASTOLIC BLOOD PRESSURE: 64 MMHG | HEART RATE: 57 BPM

## 2025-08-11 DIAGNOSIS — E78.5 DYSLIPIDEMIA: ICD-10-CM

## 2025-08-11 DIAGNOSIS — E78.00 PURE HYPERCHOLESTEROLEMIA: ICD-10-CM

## 2025-08-11 DIAGNOSIS — I25.10 CORONARY ARTERY DISEASE INVOLVING NATIVE CORONARY ARTERY OF NATIVE HEART WITHOUT ANGINA PECTORIS: Primary | ICD-10-CM

## 2025-08-11 DIAGNOSIS — I25.10 CORONARY ARTERY DISEASE INVOLVING NATIVE CORONARY ARTERY OF NATIVE HEART WITHOUT ANGINA PECTORIS: ICD-10-CM

## 2025-08-11 DIAGNOSIS — I51.7 ATRIAL ENLARGEMENT, RIGHT: ICD-10-CM

## 2025-08-11 DIAGNOSIS — D47.2 MGUS (MONOCLONAL GAMMOPATHY OF UNKNOWN SIGNIFICANCE): ICD-10-CM

## 2025-08-11 LAB
ALBUMIN SERPL-MCNC: 3.9 G/DL (ref 3.2–4.6)
ALBUMIN/GLOB SERPL: 1.5 (ref 1–1.9)
ALP SERPL-CCNC: 60 U/L (ref 40–129)
ALT SERPL-CCNC: 38 U/L (ref 8–55)
ANION GAP SERPL CALC-SCNC: 11 MMOL/L (ref 7–16)
AST SERPL-CCNC: 54 U/L (ref 15–37)
BASOPHILS # BLD: 0.06 K/UL (ref 0–0.2)
BASOPHILS NFR BLD: 0.6 % (ref 0–2)
BILIRUB SERPL-MCNC: 0.5 MG/DL (ref 0–1.2)
BUN SERPL-MCNC: 19 MG/DL (ref 8–23)
CALCIUM SERPL-MCNC: 9.5 MG/DL (ref 8.8–10.2)
CHLORIDE SERPL-SCNC: 101 MMOL/L (ref 98–107)
CO2 SERPL-SCNC: 29 MMOL/L (ref 20–29)
CREAT SERPL-MCNC: 1.02 MG/DL (ref 0.8–1.3)
DIFFERENTIAL METHOD BLD: ABNORMAL
EOSINOPHIL # BLD: 0.13 K/UL (ref 0–0.8)
EOSINOPHIL NFR BLD: 1.2 % (ref 0.5–7.8)
ERYTHROCYTE [DISTWIDTH] IN BLOOD BY AUTOMATED COUNT: 12.7 % (ref 11.9–14.6)
GLOBULIN SER CALC-MCNC: 2.6 G/DL (ref 2.3–3.5)
GLUCOSE SERPL-MCNC: 91 MG/DL (ref 70–99)
HCT VFR BLD AUTO: 44.3 % (ref 41.1–50.3)
HGB BLD-MCNC: 14.6 G/DL (ref 13.6–17.2)
IMM GRANULOCYTES # BLD AUTO: 0.01 K/UL (ref 0–0.5)
IMM GRANULOCYTES NFR BLD AUTO: 0.1 % (ref 0–5)
LYMPHOCYTES # BLD: 5.52 K/UL (ref 0.5–4.6)
LYMPHOCYTES NFR BLD: 52.1 % (ref 13–44)
MCH RBC QN AUTO: 30.7 PG (ref 26.1–32.9)
MCHC RBC AUTO-ENTMCNC: 33 G/DL (ref 31.4–35)
MCV RBC AUTO: 93.3 FL (ref 82–102)
MONOCYTES # BLD: 1.88 K/UL (ref 0.1–1.3)
MONOCYTES NFR BLD: 17.7 % (ref 4–12)
NEUTS SEG # BLD: 3 K/UL (ref 1.7–8.2)
NEUTS SEG NFR BLD: 28.3 % (ref 43–78)
NRBC # BLD: 0 K/UL (ref 0–0.2)
PLATELET # BLD AUTO: 144 K/UL (ref 150–450)
PLATELET COMMENT: SLIGHT
PMV BLD AUTO: 11.7 FL (ref 9.4–12.3)
POTASSIUM SERPL-SCNC: 4.5 MMOL/L (ref 3.5–5.1)
PROT SERPL-MCNC: 6.5 G/DL (ref 6.3–8.2)
RBC # BLD AUTO: 4.75 M/UL (ref 4.23–5.6)
RBC MORPH BLD: ABNORMAL
SODIUM SERPL-SCNC: 141 MMOL/L (ref 136–145)
WBC # BLD AUTO: 10.6 K/UL (ref 4.3–11.1)
WBC MORPH BLD: ABNORMAL

## 2025-08-11 PROCEDURE — 93000 ELECTROCARDIOGRAM COMPLETE: CPT | Performed by: INTERNAL MEDICINE

## 2025-08-11 PROCEDURE — G8420 CALC BMI NORM PARAMETERS: HCPCS | Performed by: INTERNAL MEDICINE

## 2025-08-11 PROCEDURE — 3017F COLORECTAL CA SCREEN DOC REV: CPT | Performed by: INTERNAL MEDICINE

## 2025-08-11 PROCEDURE — 1036F TOBACCO NON-USER: CPT | Performed by: INTERNAL MEDICINE

## 2025-08-11 PROCEDURE — 99214 OFFICE O/P EST MOD 30 MIN: CPT | Performed by: INTERNAL MEDICINE

## 2025-08-11 PROCEDURE — G8428 CUR MEDS NOT DOCUMENT: HCPCS | Performed by: INTERNAL MEDICINE

## 2025-08-11 PROCEDURE — 1123F ACP DISCUSS/DSCN MKR DOCD: CPT | Performed by: INTERNAL MEDICINE

## 2025-08-11 PROCEDURE — 1126F AMNT PAIN NOTED NONE PRSNT: CPT | Performed by: INTERNAL MEDICINE

## 2025-08-12 LAB
KAPPA LC FREE SER-MCNC: 24.5 MG/L (ref 2.4–20.7)
KAPPA LC FREE/LAMBDA FREE SER: 1.6 (ref 0.2–0.8)
LAMBDA LC FREE SERPL-MCNC: 15.2 MG/L (ref 4.2–27.7)
LPA SERPL-SCNC: 24.4 NMOL/L

## 2025-08-13 LAB — APO B SERPL-MCNC: 44 MG/DL

## 2025-08-15 LAB — IMMUNOLOGIST REVIEW: NORMAL

## 2025-08-26 ENCOUNTER — HOSPITAL ENCOUNTER (OUTPATIENT)
Dept: LAB | Age: 75
Discharge: HOME | End: 2025-08-26
Payer: MEDICARE

## 2025-08-26 ENCOUNTER — OFFICE VISIT (OUTPATIENT)
Dept: ONCOLOGY | Age: 75
End: 2025-08-26
Payer: MEDICARE

## 2025-08-26 VITALS
HEART RATE: 54 BPM | DIASTOLIC BLOOD PRESSURE: 75 MMHG | TEMPERATURE: 97.6 F | BODY MASS INDEX: 22.3 KG/M2 | OXYGEN SATURATION: 100 % | WEIGHT: 155.8 LBS | RESPIRATION RATE: 18 BRPM | HEIGHT: 70 IN | SYSTOLIC BLOOD PRESSURE: 124 MMHG

## 2025-08-26 DIAGNOSIS — D72.820 LYMPHOCYTOSIS: ICD-10-CM

## 2025-08-26 DIAGNOSIS — D47.2 MGUS (MONOCLONAL GAMMOPATHY OF UNKNOWN SIGNIFICANCE): Primary | ICD-10-CM

## 2025-08-26 DIAGNOSIS — D47.2 MGUS (MONOCLONAL GAMMOPATHY OF UNKNOWN SIGNIFICANCE): ICD-10-CM

## 2025-08-26 PROCEDURE — 1159F MED LIST DOCD IN RCRD: CPT | Performed by: INTERNAL MEDICINE

## 2025-08-26 PROCEDURE — 99214 OFFICE O/P EST MOD 30 MIN: CPT | Performed by: INTERNAL MEDICINE

## 2025-08-26 PROCEDURE — 1126F AMNT PAIN NOTED NONE PRSNT: CPT | Performed by: INTERNAL MEDICINE

## 2025-08-26 PROCEDURE — 1160F RVW MEDS BY RX/DR IN RCRD: CPT | Performed by: INTERNAL MEDICINE

## 2025-08-26 PROCEDURE — G8420 CALC BMI NORM PARAMETERS: HCPCS | Performed by: INTERNAL MEDICINE

## 2025-08-26 PROCEDURE — G8427 DOCREV CUR MEDS BY ELIG CLIN: HCPCS | Performed by: INTERNAL MEDICINE

## 2025-08-26 PROCEDURE — 85025 COMPLETE CBC W/AUTO DIFF WBC: CPT

## 2025-08-26 PROCEDURE — 3017F COLORECTAL CA SCREEN DOC REV: CPT | Performed by: INTERNAL MEDICINE

## 2025-08-26 PROCEDURE — 36415 COLL VENOUS BLD VENIPUNCTURE: CPT

## 2025-08-26 PROCEDURE — 1036F TOBACCO NON-USER: CPT | Performed by: INTERNAL MEDICINE

## 2025-08-26 PROCEDURE — 1123F ACP DISCUSS/DSCN MKR DOCD: CPT | Performed by: INTERNAL MEDICINE

## 2025-08-26 ASSESSMENT — PATIENT HEALTH QUESTIONNAIRE - PHQ9
SUM OF ALL RESPONSES TO PHQ QUESTIONS 1-9: 0
1. LITTLE INTEREST OR PLEASURE IN DOING THINGS: NOT AT ALL
SUM OF ALL RESPONSES TO PHQ QUESTIONS 1-9: 0
2. FEELING DOWN, DEPRESSED OR HOPELESS: NOT AT ALL

## 2025-08-27 LAB
BASOPHILS # BLD: 0.05 K/UL (ref 0–0.2)
BASOPHILS NFR BLD: 0.5 % (ref 0–2)
DIFFERENTIAL METHOD BLD: ABNORMAL
EOSINOPHIL # BLD: 0.17 K/UL (ref 0–0.8)
EOSINOPHIL NFR BLD: 1.6 % (ref 0.5–7.8)
ERYTHROCYTE [DISTWIDTH] IN BLOOD BY AUTOMATED COUNT: 12.8 % (ref 11.9–14.6)
HCT VFR BLD AUTO: 41.6 % (ref 41.1–50.3)
HGB BLD-MCNC: 13.9 G/DL (ref 13.6–17.2)
IMM GRANULOCYTES # BLD AUTO: 0.01 K/UL (ref 0–0.5)
IMM GRANULOCYTES NFR BLD AUTO: 0.1 % (ref 0–5)
LYMPHOCYTES # BLD: 6.34 K/UL (ref 0.5–4.6)
LYMPHOCYTES NFR BLD: 61 % (ref 13–44)
MCH RBC QN AUTO: 31.1 PG (ref 26.1–32.9)
MCHC RBC AUTO-ENTMCNC: 33.4 G/DL (ref 31.4–35)
MCV RBC AUTO: 93.1 FL (ref 82–102)
MONOCYTES # BLD: 0.68 K/UL (ref 0.1–1.3)
MONOCYTES NFR BLD: 6.5 % (ref 4–12)
NEUTS SEG # BLD: 3.15 K/UL (ref 1.7–8.2)
NEUTS SEG NFR BLD: 30.3 % (ref 43–78)
NRBC # BLD: 0 K/UL (ref 0–0.2)
PLATELET # BLD AUTO: 139 K/UL (ref 150–450)
PLATELET COMMENT: SLIGHT
PMV BLD AUTO: 12.3 FL (ref 9.4–12.3)
RBC # BLD AUTO: 4.47 M/UL (ref 4.23–5.6)
RBC MORPH BLD: ABNORMAL
WBC # BLD AUTO: 10.4 K/UL (ref 4.3–11.1)
WBC MORPH BLD: ABNORMAL

## 2025-09-03 LAB
FLOW CYTOMETRY RESULTS: NORMAL
SPECIMEN SOURCE: NORMAL
TEST ORDERED: NORMAL

## (undated) DEVICE — BANDAGE COMPR SELF ADH 5 YDX6 IN TAN STRL PREMIERPRO LF

## (undated) DEVICE — NEEDLE HYPO 18GA L1.5IN THN WALL PIVOTING SHLD BVL ORIENTED

## (undated) DEVICE — BLADE SHV CUT MENIS AGG + 4MM --

## (undated) DEVICE — ZIMMER® STERILE DISPOSABLE TOURNIQUET CUFF WITH PLC, DUAL PORT, SINGLE BLADDER, 30 IN. (76 CM)

## (undated) DEVICE — SOLUTION IRRIG 3000ML 0.9% SOD CHL FLX CONT 0797208] ICU MEDICAL INC]

## (undated) DEVICE — SET IRRIG DST FLX M CONN

## (undated) DEVICE — SINGLE PORT MANIFOLD: Brand: NEPTUNE 2

## (undated) DEVICE — SYR 50ML LR LCK 1ML GRAD NSAF --

## (undated) DEVICE — PADDING CAST W4INXL4YD ST COT COHESIVE HND TEARABLE SPEC

## (undated) DEVICE — T-DRAPE,EXTREMITY,STERILE: Brand: MEDLINE

## (undated) DEVICE — SURGICAL PROCEDURE PACK BASIC ST FRANCIS

## (undated) DEVICE — (D)STRIP SKN CLSR 0.5X4IN WHT --

## (undated) DEVICE — SET IRRIG W 96IN TBNG 4 LN FLX BG

## (undated) DEVICE — (D)PREP SKN CHLRAPRP APPL 26ML -- CONVERT TO ITEM 371833

## (undated) DEVICE — WEREWOLF FLOW 50 COBLATION WAND: Brand: COBLATION

## (undated) DEVICE — STERILE HOOK LOCK LATEX FREE ELASTIC BANDAGE 6INX5YD: Brand: HOOK LOCK™

## (undated) DEVICE — MASTISOL ADHESIVE LIQ 2/3ML

## (undated) DEVICE — STOCKINETTE,IMPERVIOUS,12X48,STERILE: Brand: MEDLINE

## (undated) DEVICE — INTENDED FOR TISSUE SEPARATION, AND OTHER PROCEDURES THAT REQUIRE A SHARP SURGICAL BLADE TO PUNCTURE OR CUT.: Brand: BARD-PARKER ® STAINLESS STEEL BLADES